# Patient Record
Sex: MALE | Race: WHITE | NOT HISPANIC OR LATINO | URBAN - METROPOLITAN AREA
[De-identification: names, ages, dates, MRNs, and addresses within clinical notes are randomized per-mention and may not be internally consistent; named-entity substitution may affect disease eponyms.]

---

## 2023-10-28 ENCOUNTER — INPATIENT (INPATIENT)
Facility: HOSPITAL | Age: 63
LOS: 4 days | Discharge: HOME | DRG: 669 | End: 2023-11-02
Attending: INTERNAL MEDICINE | Admitting: INTERNAL MEDICINE
Payer: COMMERCIAL

## 2023-10-28 VITALS
HEIGHT: 67 IN | RESPIRATION RATE: 20 BRPM | WEIGHT: 199.96 LBS | SYSTOLIC BLOOD PRESSURE: 178 MMHG | DIASTOLIC BLOOD PRESSURE: 98 MMHG | HEART RATE: 90 BPM | OXYGEN SATURATION: 95 % | TEMPERATURE: 99 F

## 2023-10-28 DIAGNOSIS — R31.9 HEMATURIA, UNSPECIFIED: ICD-10-CM

## 2023-10-28 DIAGNOSIS — N13.2 HYDRONEPHROSIS WITH RENAL AND URETERAL CALCULOUS OBSTRUCTION: ICD-10-CM

## 2023-10-28 LAB
ANION GAP SERPL CALC-SCNC: 13 MMOL/L — SIGNIFICANT CHANGE UP (ref 5–17)
ANION GAP SERPL CALC-SCNC: 13 MMOL/L — SIGNIFICANT CHANGE UP (ref 5–17)
APPEARANCE UR: ABNORMAL
APPEARANCE UR: ABNORMAL
APTT BLD: 26.8 SEC — SIGNIFICANT CHANGE UP (ref 24.5–35.6)
APTT BLD: 26.8 SEC — SIGNIFICANT CHANGE UP (ref 24.5–35.6)
BACTERIA # UR AUTO: NEGATIVE — SIGNIFICANT CHANGE UP
BACTERIA # UR AUTO: NEGATIVE — SIGNIFICANT CHANGE UP
BASE EXCESS BLDV CALC-SCNC: 1.2 MMOL/L — SIGNIFICANT CHANGE UP (ref -2–3)
BASE EXCESS BLDV CALC-SCNC: 1.2 MMOL/L — SIGNIFICANT CHANGE UP (ref -2–3)
BASE EXCESS BLDV CALC-SCNC: 2.3 MMOL/L — SIGNIFICANT CHANGE UP (ref -2–3)
BASE EXCESS BLDV CALC-SCNC: 2.3 MMOL/L — SIGNIFICANT CHANGE UP (ref -2–3)
BASOPHILS # BLD AUTO: 0.09 K/UL — SIGNIFICANT CHANGE UP (ref 0–0.2)
BASOPHILS # BLD AUTO: 0.09 K/UL — SIGNIFICANT CHANGE UP (ref 0–0.2)
BASOPHILS NFR BLD AUTO: 0.8 % — SIGNIFICANT CHANGE UP (ref 0–2)
BASOPHILS NFR BLD AUTO: 0.8 % — SIGNIFICANT CHANGE UP (ref 0–2)
BILIRUB UR-MCNC: NEGATIVE — SIGNIFICANT CHANGE UP
BILIRUB UR-MCNC: NEGATIVE — SIGNIFICANT CHANGE UP
BUN SERPL-MCNC: 16 MG/DL — SIGNIFICANT CHANGE UP (ref 7–23)
BUN SERPL-MCNC: 16 MG/DL — SIGNIFICANT CHANGE UP (ref 7–23)
CA-I SERPL-SCNC: 1.13 MMOL/L — LOW (ref 1.15–1.33)
CA-I SERPL-SCNC: 1.13 MMOL/L — LOW (ref 1.15–1.33)
CA-I SERPL-SCNC: 1.16 MMOL/L — SIGNIFICANT CHANGE UP (ref 1.15–1.33)
CA-I SERPL-SCNC: 1.16 MMOL/L — SIGNIFICANT CHANGE UP (ref 1.15–1.33)
CALCIUM SERPL-MCNC: 8.8 MG/DL — SIGNIFICANT CHANGE UP (ref 8.4–10.5)
CALCIUM SERPL-MCNC: 8.8 MG/DL — SIGNIFICANT CHANGE UP (ref 8.4–10.5)
CHLORIDE BLDV-SCNC: 104 MMOL/L — SIGNIFICANT CHANGE UP (ref 96–108)
CHLORIDE BLDV-SCNC: 104 MMOL/L — SIGNIFICANT CHANGE UP (ref 96–108)
CHLORIDE BLDV-SCNC: 106 MMOL/L — SIGNIFICANT CHANGE UP (ref 96–108)
CHLORIDE BLDV-SCNC: 106 MMOL/L — SIGNIFICANT CHANGE UP (ref 96–108)
CHLORIDE SERPL-SCNC: 99 MMOL/L — SIGNIFICANT CHANGE UP (ref 96–108)
CHLORIDE SERPL-SCNC: 99 MMOL/L — SIGNIFICANT CHANGE UP (ref 96–108)
CO2 BLDV-SCNC: 29 MMOL/L — HIGH (ref 22–26)
CO2 SERPL-SCNC: 22 MMOL/L — SIGNIFICANT CHANGE UP (ref 22–31)
CO2 SERPL-SCNC: 22 MMOL/L — SIGNIFICANT CHANGE UP (ref 22–31)
COLOR SPEC: ABNORMAL
COLOR SPEC: ABNORMAL
CREAT SERPL-MCNC: 1.34 MG/DL — HIGH (ref 0.5–1.3)
CREAT SERPL-MCNC: 1.34 MG/DL — HIGH (ref 0.5–1.3)
DIFF PNL FLD: ABNORMAL
DIFF PNL FLD: ABNORMAL
EGFR: 60 ML/MIN/1.73M2 — SIGNIFICANT CHANGE UP
EGFR: 60 ML/MIN/1.73M2 — SIGNIFICANT CHANGE UP
EOSINOPHIL # BLD AUTO: 0.13 K/UL — SIGNIFICANT CHANGE UP (ref 0–0.5)
EOSINOPHIL # BLD AUTO: 0.13 K/UL — SIGNIFICANT CHANGE UP (ref 0–0.5)
EOSINOPHIL NFR BLD AUTO: 1.1 % — SIGNIFICANT CHANGE UP (ref 0–6)
EOSINOPHIL NFR BLD AUTO: 1.1 % — SIGNIFICANT CHANGE UP (ref 0–6)
EPI CELLS # UR: 0 /HPF — SIGNIFICANT CHANGE UP
EPI CELLS # UR: 0 /HPF — SIGNIFICANT CHANGE UP
GAS PNL BLDV: 135 MMOL/L — LOW (ref 136–145)
GAS PNL BLDV: 135 MMOL/L — LOW (ref 136–145)
GAS PNL BLDV: 138 MMOL/L — SIGNIFICANT CHANGE UP (ref 136–145)
GAS PNL BLDV: 138 MMOL/L — SIGNIFICANT CHANGE UP (ref 136–145)
GAS PNL BLDV: SIGNIFICANT CHANGE UP
GLUCOSE BLDC GLUCOMTR-MCNC: 294 MG/DL — HIGH (ref 70–99)
GLUCOSE BLDC GLUCOMTR-MCNC: 294 MG/DL — HIGH (ref 70–99)
GLUCOSE BLDV-MCNC: 273 MG/DL — HIGH (ref 70–99)
GLUCOSE BLDV-MCNC: 273 MG/DL — HIGH (ref 70–99)
GLUCOSE BLDV-MCNC: 447 MG/DL — HIGH (ref 70–99)
GLUCOSE BLDV-MCNC: 447 MG/DL — HIGH (ref 70–99)
GLUCOSE SERPL-MCNC: 477 MG/DL — CRITICAL HIGH (ref 70–99)
GLUCOSE SERPL-MCNC: 477 MG/DL — CRITICAL HIGH (ref 70–99)
GLUCOSE UR QL: ABNORMAL
GLUCOSE UR QL: ABNORMAL
HCO3 BLDV-SCNC: 27 MMOL/L — SIGNIFICANT CHANGE UP (ref 22–29)
HCO3 BLDV-SCNC: 27 MMOL/L — SIGNIFICANT CHANGE UP (ref 22–29)
HCO3 BLDV-SCNC: 28 MMOL/L — SIGNIFICANT CHANGE UP (ref 22–29)
HCO3 BLDV-SCNC: 28 MMOL/L — SIGNIFICANT CHANGE UP (ref 22–29)
HCT VFR BLD CALC: 25.8 % — LOW (ref 39–50)
HCT VFR BLD CALC: 25.8 % — LOW (ref 39–50)
HCT VFR BLDA CALC: 24 % — LOW (ref 39–51)
HCT VFR BLDA CALC: 24 % — LOW (ref 39–51)
HCT VFR BLDA CALC: 26 % — LOW (ref 39–51)
HCT VFR BLDA CALC: 26 % — LOW (ref 39–51)
HGB BLD CALC-MCNC: 8.1 G/DL — LOW (ref 12.6–17.4)
HGB BLD CALC-MCNC: 8.1 G/DL — LOW (ref 12.6–17.4)
HGB BLD CALC-MCNC: 8.5 G/DL — LOW (ref 12.6–17.4)
HGB BLD CALC-MCNC: 8.5 G/DL — LOW (ref 12.6–17.4)
HGB BLD-MCNC: 8.4 G/DL — LOW (ref 13–17)
HGB BLD-MCNC: 8.4 G/DL — LOW (ref 13–17)
HYALINE CASTS # UR AUTO: 15 /LPF — HIGH (ref 0–7)
HYALINE CASTS # UR AUTO: 15 /LPF — HIGH (ref 0–7)
IMM GRANULOCYTES NFR BLD AUTO: 1.4 % — HIGH (ref 0–0.9)
IMM GRANULOCYTES NFR BLD AUTO: 1.4 % — HIGH (ref 0–0.9)
INR BLD: 1.11 RATIO — SIGNIFICANT CHANGE UP (ref 0.85–1.18)
INR BLD: 1.11 RATIO — SIGNIFICANT CHANGE UP (ref 0.85–1.18)
KETONES UR-MCNC: NEGATIVE — SIGNIFICANT CHANGE UP
KETONES UR-MCNC: NEGATIVE — SIGNIFICANT CHANGE UP
LACTATE BLDV-MCNC: 1.6 MMOL/L — SIGNIFICANT CHANGE UP (ref 0.5–2)
LACTATE BLDV-MCNC: 1.6 MMOL/L — SIGNIFICANT CHANGE UP (ref 0.5–2)
LACTATE BLDV-MCNC: 1.9 MMOL/L — SIGNIFICANT CHANGE UP (ref 0.5–2)
LACTATE BLDV-MCNC: 1.9 MMOL/L — SIGNIFICANT CHANGE UP (ref 0.5–2)
LEUKOCYTE ESTERASE UR-ACNC: NEGATIVE — SIGNIFICANT CHANGE UP
LEUKOCYTE ESTERASE UR-ACNC: NEGATIVE — SIGNIFICANT CHANGE UP
LYMPHOCYTES # BLD AUTO: 2.4 K/UL — SIGNIFICANT CHANGE UP (ref 1–3.3)
LYMPHOCYTES # BLD AUTO: 2.4 K/UL — SIGNIFICANT CHANGE UP (ref 1–3.3)
LYMPHOCYTES # BLD AUTO: 20.7 % — SIGNIFICANT CHANGE UP (ref 13–44)
LYMPHOCYTES # BLD AUTO: 20.7 % — SIGNIFICANT CHANGE UP (ref 13–44)
MCHC RBC-ENTMCNC: 32.6 GM/DL — SIGNIFICANT CHANGE UP (ref 32–36)
MCHC RBC-ENTMCNC: 32.6 GM/DL — SIGNIFICANT CHANGE UP (ref 32–36)
MCHC RBC-ENTMCNC: 32.8 PG — SIGNIFICANT CHANGE UP (ref 27–34)
MCHC RBC-ENTMCNC: 32.8 PG — SIGNIFICANT CHANGE UP (ref 27–34)
MCV RBC AUTO: 100.8 FL — HIGH (ref 80–100)
MCV RBC AUTO: 100.8 FL — HIGH (ref 80–100)
MONOCYTES # BLD AUTO: 1.17 K/UL — HIGH (ref 0–0.9)
MONOCYTES # BLD AUTO: 1.17 K/UL — HIGH (ref 0–0.9)
MONOCYTES NFR BLD AUTO: 10.1 % — SIGNIFICANT CHANGE UP (ref 2–14)
MONOCYTES NFR BLD AUTO: 10.1 % — SIGNIFICANT CHANGE UP (ref 2–14)
NEUTROPHILS # BLD AUTO: 7.66 K/UL — HIGH (ref 1.8–7.4)
NEUTROPHILS # BLD AUTO: 7.66 K/UL — HIGH (ref 1.8–7.4)
NEUTROPHILS NFR BLD AUTO: 65.9 % — SIGNIFICANT CHANGE UP (ref 43–77)
NEUTROPHILS NFR BLD AUTO: 65.9 % — SIGNIFICANT CHANGE UP (ref 43–77)
NITRITE UR-MCNC: NEGATIVE — SIGNIFICANT CHANGE UP
NITRITE UR-MCNC: NEGATIVE — SIGNIFICANT CHANGE UP
NRBC # BLD: 0 /100 WBCS — SIGNIFICANT CHANGE UP (ref 0–0)
NRBC # BLD: 0 /100 WBCS — SIGNIFICANT CHANGE UP (ref 0–0)
PCO2 BLDV: 47 MMHG — SIGNIFICANT CHANGE UP (ref 42–55)
PCO2 BLDV: 47 MMHG — SIGNIFICANT CHANGE UP (ref 42–55)
PCO2 BLDV: 49 MMHG — SIGNIFICANT CHANGE UP (ref 42–55)
PCO2 BLDV: 49 MMHG — SIGNIFICANT CHANGE UP (ref 42–55)
PH BLDV: 7.35 — SIGNIFICANT CHANGE UP (ref 7.32–7.43)
PH BLDV: 7.35 — SIGNIFICANT CHANGE UP (ref 7.32–7.43)
PH BLDV: 7.38 — SIGNIFICANT CHANGE UP (ref 7.32–7.43)
PH BLDV: 7.38 — SIGNIFICANT CHANGE UP (ref 7.32–7.43)
PH UR: 6 — SIGNIFICANT CHANGE UP (ref 5–8)
PH UR: 6 — SIGNIFICANT CHANGE UP (ref 5–8)
PLATELET # BLD AUTO: 162 K/UL — SIGNIFICANT CHANGE UP (ref 150–400)
PLATELET # BLD AUTO: 162 K/UL — SIGNIFICANT CHANGE UP (ref 150–400)
PO2 BLDV: 19 MMHG — LOW (ref 25–45)
PO2 BLDV: 19 MMHG — LOW (ref 25–45)
PO2 BLDV: 30 MMHG — SIGNIFICANT CHANGE UP (ref 25–45)
PO2 BLDV: 30 MMHG — SIGNIFICANT CHANGE UP (ref 25–45)
POTASSIUM BLDV-SCNC: 4.6 MMOL/L — SIGNIFICANT CHANGE UP (ref 3.5–5.1)
POTASSIUM SERPL-MCNC: 4.4 MMOL/L — SIGNIFICANT CHANGE UP (ref 3.5–5.3)
POTASSIUM SERPL-MCNC: 4.4 MMOL/L — SIGNIFICANT CHANGE UP (ref 3.5–5.3)
POTASSIUM SERPL-SCNC: 4.4 MMOL/L — SIGNIFICANT CHANGE UP (ref 3.5–5.3)
POTASSIUM SERPL-SCNC: 4.4 MMOL/L — SIGNIFICANT CHANGE UP (ref 3.5–5.3)
PROT UR-MCNC: ABNORMAL
PROT UR-MCNC: ABNORMAL
PROTHROM AB SERPL-ACNC: 12.2 SEC — SIGNIFICANT CHANGE UP (ref 9.5–13)
PROTHROM AB SERPL-ACNC: 12.2 SEC — SIGNIFICANT CHANGE UP (ref 9.5–13)
RBC # BLD: 2.56 M/UL — LOW (ref 4.2–5.8)
RBC # BLD: 2.56 M/UL — LOW (ref 4.2–5.8)
RBC # FLD: 14.7 % — HIGH (ref 10.3–14.5)
RBC # FLD: 14.7 % — HIGH (ref 10.3–14.5)
RBC CASTS # UR COMP ASSIST: 254 /HPF — HIGH (ref 0–4)
RBC CASTS # UR COMP ASSIST: 254 /HPF — HIGH (ref 0–4)
SAO2 % BLDV: 26.7 % — LOW (ref 67–88)
SAO2 % BLDV: 26.7 % — LOW (ref 67–88)
SAO2 % BLDV: 48.5 % — LOW (ref 67–88)
SAO2 % BLDV: 48.5 % — LOW (ref 67–88)
SODIUM SERPL-SCNC: 134 MMOL/L — LOW (ref 135–145)
SODIUM SERPL-SCNC: 134 MMOL/L — LOW (ref 135–145)
SP GR SPEC: 1.03 — HIGH (ref 1.01–1.02)
SP GR SPEC: 1.03 — HIGH (ref 1.01–1.02)
UROBILINOGEN FLD QL: NEGATIVE — SIGNIFICANT CHANGE UP
UROBILINOGEN FLD QL: NEGATIVE — SIGNIFICANT CHANGE UP
WBC # BLD: 11.61 K/UL — HIGH (ref 3.8–10.5)
WBC # BLD: 11.61 K/UL — HIGH (ref 3.8–10.5)
WBC # FLD AUTO: 11.61 K/UL — HIGH (ref 3.8–10.5)
WBC # FLD AUTO: 11.61 K/UL — HIGH (ref 3.8–10.5)
WBC UR QL: 1 /HPF — SIGNIFICANT CHANGE UP (ref 0–5)
WBC UR QL: 1 /HPF — SIGNIFICANT CHANGE UP (ref 0–5)

## 2023-10-28 PROCEDURE — 99285 EMERGENCY DEPT VISIT HI MDM: CPT

## 2023-10-28 PROCEDURE — 74177 CT ABD & PELVIS W/CONTRAST: CPT | Mod: 26,MA

## 2023-10-28 RX ORDER — DEXTROSE 50 % IN WATER 50 %
25 SYRINGE (ML) INTRAVENOUS ONCE
Refills: 0 | Status: DISCONTINUED | OUTPATIENT
Start: 2023-10-28 | End: 2023-10-31

## 2023-10-28 RX ORDER — ASPIRIN/CALCIUM CARB/MAGNESIUM 324 MG
81 TABLET ORAL DAILY
Refills: 0 | Status: DISCONTINUED | OUTPATIENT
Start: 2023-10-28 | End: 2023-10-31

## 2023-10-28 RX ORDER — MORPHINE SULFATE 50 MG/1
4 CAPSULE, EXTENDED RELEASE ORAL EVERY 6 HOURS
Refills: 0 | Status: DISCONTINUED | OUTPATIENT
Start: 2023-10-28 | End: 2023-10-31

## 2023-10-28 RX ORDER — INSULIN LISPRO 100/ML
2 VIAL (ML) SUBCUTANEOUS
Refills: 0 | Status: DISCONTINUED | OUTPATIENT
Start: 2023-10-28 | End: 2023-10-29

## 2023-10-28 RX ORDER — SODIUM CHLORIDE 9 MG/ML
1000 INJECTION, SOLUTION INTRAVENOUS
Refills: 0 | Status: DISCONTINUED | OUTPATIENT
Start: 2023-10-28 | End: 2023-10-31

## 2023-10-28 RX ORDER — GLUCAGON INJECTION, SOLUTION 0.5 MG/.1ML
1 INJECTION, SOLUTION SUBCUTANEOUS ONCE
Refills: 0 | Status: DISCONTINUED | OUTPATIENT
Start: 2023-10-28 | End: 2023-10-31

## 2023-10-28 RX ORDER — INSULIN LISPRO 100/ML
VIAL (ML) SUBCUTANEOUS
Refills: 0 | Status: DISCONTINUED | OUTPATIENT
Start: 2023-10-28 | End: 2023-10-29

## 2023-10-28 RX ORDER — SODIUM CHLORIDE 9 MG/ML
1000 INJECTION INTRAMUSCULAR; INTRAVENOUS; SUBCUTANEOUS ONCE
Refills: 0 | Status: COMPLETED | OUTPATIENT
Start: 2023-10-28 | End: 2023-10-28

## 2023-10-28 RX ORDER — INSULIN GLARGINE 100 [IU]/ML
20 INJECTION, SOLUTION SUBCUTANEOUS AT BEDTIME
Refills: 0 | Status: DISCONTINUED | OUTPATIENT
Start: 2023-10-28 | End: 2023-10-29

## 2023-10-28 RX ORDER — SODIUM CHLORIDE 9 MG/ML
1000 INJECTION INTRAMUSCULAR; INTRAVENOUS; SUBCUTANEOUS
Refills: 0 | Status: DISCONTINUED | OUTPATIENT
Start: 2023-10-28 | End: 2023-10-31

## 2023-10-28 RX ORDER — ACETAMINOPHEN 500 MG
650 TABLET ORAL EVERY 6 HOURS
Refills: 0 | Status: DISCONTINUED | OUTPATIENT
Start: 2023-10-28 | End: 2023-10-31

## 2023-10-28 RX ORDER — INSULIN LISPRO 100/ML
5 VIAL (ML) SUBCUTANEOUS ONCE
Refills: 0 | Status: COMPLETED | OUTPATIENT
Start: 2023-10-28 | End: 2023-10-28

## 2023-10-28 RX ORDER — SODIUM CHLORIDE 9 MG/ML
2000 INJECTION INTRAMUSCULAR; INTRAVENOUS; SUBCUTANEOUS ONCE
Refills: 0 | Status: DISCONTINUED | OUTPATIENT
Start: 2023-10-28 | End: 2023-10-28

## 2023-10-28 RX ORDER — DEXTROSE 50 % IN WATER 50 %
15 SYRINGE (ML) INTRAVENOUS ONCE
Refills: 0 | Status: DISCONTINUED | OUTPATIENT
Start: 2023-10-28 | End: 2023-10-31

## 2023-10-28 RX ORDER — DEXTROSE 50 % IN WATER 50 %
12.5 SYRINGE (ML) INTRAVENOUS ONCE
Refills: 0 | Status: DISCONTINUED | OUTPATIENT
Start: 2023-10-28 | End: 2023-10-31

## 2023-10-28 RX ORDER — INSULIN HUMAN 100 [IU]/ML
5 INJECTION, SOLUTION SUBCUTANEOUS ONCE
Refills: 0 | Status: DISCONTINUED | OUTPATIENT
Start: 2023-10-28 | End: 2023-10-28

## 2023-10-28 RX ADMIN — Medication 5 UNIT(S): at 06:28

## 2023-10-28 RX ADMIN — SODIUM CHLORIDE 100 MILLILITER(S): 9 INJECTION INTRAMUSCULAR; INTRAVENOUS; SUBCUTANEOUS at 23:57

## 2023-10-28 RX ADMIN — INSULIN GLARGINE 20 UNIT(S): 100 INJECTION, SOLUTION SUBCUTANEOUS at 23:18

## 2023-10-28 RX ADMIN — SODIUM CHLORIDE 1000 MILLILITER(S): 9 INJECTION INTRAMUSCULAR; INTRAVENOUS; SUBCUTANEOUS at 02:53

## 2023-10-28 NOTE — CONSULT NOTE ADULT - ATTENDING COMMENTS
Pt seen and examined. CT reviewed. Has gross hematuria requiring CBI. On exam today, irrigated at bedside with some clot obtained. On CT, appears to have hydro distal to the L UVJ stone. Given this and continued hematuria despite irrigation, suspicious for intravesical pathology. Will likely need OR for eval with clot evacuation and possible resection. Needs medical optimization in pt with poor medical care prior. Cont CBI for now. Transfuse as needed.

## 2023-10-28 NOTE — CONSULT NOTE ADULT - ASSESSMENT
63 year old male with hematuria, clots    -continue CBI and wean as tolerated  -transfuse as needed  -medicine evaluation  -case d/w Dr Rahman 63 year old male with hematuria, clots; L 3mm UVJ calculus    -continue CBI and wean as tolerated  -transfuse as needed  -IV fluids  -medicine evaluation  -case d/w Dr Rahman

## 2023-10-28 NOTE — ED PROVIDER NOTE - NS ED ROS FT
GENERAL: No fever, no chills  	EYES: No change in vision  	HEENT: No trouble swallowing or speaking  	CARDIAC: No chest pain  	PULMONARY: No cough, no SOB  	GI: No abdominal pain, no nausea, no vomiting, no diarrhea, no constipation  	: + changes in urination  	SKIN: No rashes  	NEURO: No headache, no numbness  	MSK: No visible bony deformity   Otherwise as HPI or negative.

## 2023-10-28 NOTE — ED PROVIDER NOTE - OBJECTIVE STATEMENT
This is a 63 year old male with no significant pmh presenting with painless hematuria for the past few weeks worsening during past week, passing clots not on anticoagulation. Denies any lightheadedness, dizziness, shortness of breath, abdominal pain. Feels a little bit of "retaining feeling" however has been urinating normally and spontaneously. Denies any fever/chills, abdominal pain, flank pain. No nausea/vomiting. No prostate history. No cancer history however has no regular check up with doctors.

## 2023-10-28 NOTE — H&P ADULT - ASSESSMENT
63y old Male with no PMHx, and has not seen a PMD recently who presents with painless hematuria for 2 weeks, passing clots, not on AC.    Denies any lightheadedness, dizziness, shortness of breath, abdominal pain. Feels a little bit of "retaining feeling" however has been urinating normally and spontaneously. Denies any fever/chills, abdominal pain, flank pain. No nausea/vomiting. No prostate history. No cancer history however has not had a regular check up with doctors in a while.  He states that he noticed increased difficulty voiding and pain at the tip of the penis with the bleeding.   denies any flank pain, nausea/vomiting, fevers, chills.    In the ER ptn was seen by , CBI placed, ct A/P done, ptn is noted to be hyperglycemic    CBI and renal stone/Hydro management as per   start Lantus, pre meal Lispro, ISS, check HA1C, check TFTs, check Lipid panel  Endo called  check VBG, check BHB( ordered)  DVT ppx w SCD 63y old Male with no PMHx, and has not seen a PMD recently who presents with painless hematuria for 2 weeks, passing clots, not on AC.    Denies any lightheadedness, dizziness, shortness of breath, abdominal pain. Feels a little bit of "retaining feeling" however has been urinating normally and spontaneously. Denies any fever/chills, abdominal pain, flank pain. No nausea/vomiting. No prostate history. No cancer history however has not had a regular check up with doctors in a while.  He states that he noticed increased difficulty voiding and pain at the tip of the penis with the bleeding.   denies any flank pain, nausea/vomiting, fevers, chills.    In the ER ptn was seen by , CBI placed, ct A/P done, ptn is noted to be hyperglycemic, and anemic w elevated MCV    CBI and renal stone/Hydro management as per   start Lantus, pre meal Lispro, ISS, check HA1C, check TFTs, check Lipid panel  Endo called  check VBG, check BHB( ordered)  anemia, tend H/H, check anemia indices  DVT ppx w SCD

## 2023-10-28 NOTE — ED ADULT NURSE NOTE - OBJECTIVE STATEMENT
Pt is 63Y M, denies pmhx, c/o urinary symptoms, frequency, blood in urine with clots for 2 weeks, pt states clots started last two days, blood for 2 weeks, no pain, pain in suprapubic area, pt presents to ED with distended abdomen, no NVD, fever, chills, or any other symptoms, pt A&Ox4, ambulatory, updated on plan of care,

## 2023-10-28 NOTE — ED PROVIDER NOTE - PROGRESS NOTE DETAILS
Eric Jaquez, PGY2 - post void residual 50ml. Not in retention. Urine grossly dark and red. no clots seen. Yonis Paris MD, PGY2  Pt seen by urology, clots in bladder, CBI initiated. Also new onset DM. Pt will require admission. Urology requesting med admit. Pt in agreement with plan. Endorsed to unattached hospitalist, Dr. Reed.

## 2023-10-28 NOTE — ED PROVIDER NOTE - CLINICAL SUMMARY MEDICAL DECISION MAKING FREE TEXT BOX
63-year-old male smoker here with complaint of gross hematuria for the past 1 week.  Not on any anticoagulants.  He reports he has had similar in the past but it was just a little bit and then would clear up.  For the past 1 week he has been having bright red urine occasionally with clots and feels as though he is not completely emptying his bladder.  He reports he has some mild suprapubic discomfort as well as some discomfort at the tip of his penis with urination.  No flank pain no fevers no weight loss or night sweats.  He does report some mild shortness of breath with exertion and denies chest pain.  On exam he is well-appearing hemodynamically stable normal heart and lung exam abdomen is obese soft with a reducible umbilical hernia that is nontender mild suprapubic tenderness.  Check UA UC for signs of infection.  With gross hematuria and history of tobacco use concern for malignancy check CT abdomen pelvis.  Check labs including CBC and kidney functions.  Will obtain ultrasound the bladder to evaluate for retention.  Dispo pending results of above.

## 2023-10-28 NOTE — H&P ADULT - HISTORY OF PRESENT ILLNESS
63y old Male with no PMHx, and has not seen a PMD recently who presents with painless hematuria for 2 weeks, passing clots, not on AC.    Denies any lightheadedness, dizziness, shortness of breath, abdominal pain. Feels a little bit of "retaining feeling" however has been urinating normally and spontaneously. Denies any fever/chills, abdominal pain, flank pain. No nausea/vomiting. No prostate history. No cancer history however has not had a regular check up with doctors in a while.  He states that he noticed increased difficulty voiding and pain at the tip of the penis with the bleeding.   denies any flank pain, nausea/vomiting, fevers, chills.    In the ER ptn was seen by , CBI placed, ct A/P done, ptn is noted to be hyperglycemic

## 2023-10-28 NOTE — CONSULT NOTE ADULT - SUBJECTIVE AND OBJECTIVE BOX
UROLOGY CONSULT NOTE    HPI:  This is a 63y old Male with no PMHx, and has not seen a PMD recently who presents with blood in his urine for 2 weeks.  He states that he noticed increased difficulty voiding and pain at the tip of the penis with the bleeding.  He admits to seeing clots in the urine but denies any flank pain, nausea/vomiting, fevers, chills.  In the ER, a 6 eye catheter was used to irrigate out a large volume of clots and CBI was then started.      PAST MEDICAL HISTORY    No pertinent past medical history      PAST SURGICAL HISTORY    No significant past surgical history      FAMILY HISTORY    noncontributory    SOCIAL HISTORY    current every day smoker    HOME MEDICATIONS    none    DRUG ALLERGIES    No Known Allergies      REVIEW OF SYSTEMS: Pertinent positives and negatives as stated in HPI, otherwise negative      VITAL SIGNS    T(F): 99.1, Max: 99.1 (10-28-23 @ 04:27)  HR: 83  BP: 160/91  RR: 16  SpO2: 94%    I's & O's      28 Oct 2023 07:01  -  28 Oct 2023 15:21  --------------------------------------------------------  IN: 3300 mL / OUT: 0 mL / NET: 3300 mL        PHYSICAL EXAM    Gen: Well groomed, well dressed, well nourished; missing dentition  Abd: Soft, NT/ND  : Circumcised, no lesions.  Testes descended bilaterally.  Ext: No edema present b/l      LABS:                        8.4    11.61 )-----------( 162               25.8     134  |  99  |  16  ----------------------------<  477  4.4   |  22  |  1.34    Ca    8.8    TPro  6.7  /  Alb  3.5  /  TBili  0.5  /  DBili  0.2  /  AST  39  /  ALT  26  /  AlkPhos  100    PT: 12.2 sec;   INR: 1.11 ratio  PTT:26.8 sec      Urinalysis Basic:    Color: Red / Appearance: Turbid / S.027 / pH: x  Gluc: x / Ketone: Negative  / Bili: Negative / Urobili: Negative   Blood: x / Protein: 100 mg/dL / Nitrite: Negative   Leuk Esterase: Negative / RBC: 254 /hpf / WBC 1 /HPF   Sq Epi: x / Non Sq Epi: x / Bacteria: Negative        IMAGING:    CT: Left hydronephrosis with a 3 mm distal ureteral calculus near the UVJ.   The stone does not appear to be obstructive at the time of examination.  Suspect blood clots within the bladder lumen. Consider ultrasound to   evaluate for ureteral jets.

## 2023-10-28 NOTE — CONSULT NOTE ADULT - SUBJECTIVE AND OBJECTIVE BOX
CHIEF COMPLAINT:    HISTORY OF PRESENT ILLNESS:   63y old Male with no PMHx, and has not seen a PMD recently who presents with painless hematuria for 2 weeks, passing clots, not on AC.    Denies any lightheadedness, dizziness, shortness of breath, abdominal pain. Feels a little bit of "retaining feeling" however has been urinating normally and spontaneously. Denies any fever/chills, abdominal pain, flank pain. No nausea/vomiting. No prostate history. No cancer history however has not had a regular check up with doctors in a while.  He states that he noticed increased difficulty voiding and pain at the tip of the penis with the bleeding.   denies any flank pain, nausea/vomiting, fevers, chills.    In the ER ptn was seen by , CBI placed, ct A/P done,    PAST MEDICAL & SURGICAL HISTORY:  No pertinent past medical history      No significant past surgical history              MEDICATIONS:  aspirin enteric coated 81 milliGRAM(s) Oral daily        acetaminophen     Tablet .. 650 milliGRAM(s) Oral every 6 hours PRN      dextrose 50% Injectable 12.5 Gram(s) IV Push once  dextrose 50% Injectable 25 Gram(s) IV Push once  dextrose 50% Injectable 25 Gram(s) IV Push once  dextrose Oral Gel 15 Gram(s) Oral once PRN  glucagon  Injectable 1 milliGRAM(s) IntraMuscular once  insulin glargine Injectable (LANTUS) 20 Unit(s) SubCutaneous at bedtime  insulin lispro (ADMELOG) corrective regimen sliding scale   SubCutaneous three times a day before meals  insulin lispro Injectable (ADMELOG) 2 Unit(s) SubCutaneous three times a day before meals    dextrose 5%. 1000 milliLiter(s) IV Continuous <Continuous>  dextrose 5%. 1000 milliLiter(s) IV Continuous <Continuous>  sodium chloride 0.9%. 1000 milliLiter(s) IV Continuous <Continuous>      FAMILY HISTORY:      SOCIAL HISTORY:    [ ] Non-smoker  [ ] Smoker  [ ] Alcohol    Allergies    No Known Allergies    Intolerances    	    REVIEW OF SYSTEMS:  CONSTITUTIONAL: No fever, weight loss, or fatigue  EYES: No eye pain, visual disturbances, or discharge  ENMT:  No difficulty hearing, tinnitus, vertigo; No sinus or throat pain  NECK: No pain or stiffness  RESPIRATORY: No cough, wheezing, chills or hemoptysis; No Shortness of Breath  CARDIOVASCULAR: No chest pain, palpitations, passing out, dizziness, or leg swelling  GASTROINTESTINAL: No abdominal or epigastric pain. No nausea, vomiting, or hematemesis; No diarrhea or constipation. No melena or hematochezia.  GENITOURINARY: No dysuria, frequency, ++hematuria, or incontinence  NEUROLOGICAL: No headaches, memory loss, loss of strength, numbness, or tremors  SKIN: No itching, burning, rashes, or lesions   LYMPH Nodes: No enlarged glands  ENDOCRINE: No heat or cold intolerance; No hair loss  MUSCULOSKELETAL: No joint pain or swelling; No muscle, back, or extremity pain  PSYCHIATRIC: No depression, anxiety, mood swings, or difficulty sleeping  HEME/LYMPH: No easy bruising, or bleeding gums  ALLERY AND IMMUNOLOGIC: No hives or eczema	    [ ] All others negative	  [ ] Unable to obtain    PHYSICAL EXAM:  T(C): 36.9 (10-28-23 @ 15:19), Max: 37.3 (10-28-23 @ 04:27)  HR: 81 (10-28-23 @ 15:19) (81 - 90)  BP: 140/78 (10-28-23 @ 15:19) (140/78 - 178/98)  RR: 16 (10-28-23 @ 15:19) (16 - 20)  SpO2: 95% (10-28-23 @ 15:19) (94% - 98%)  Wt(kg): --  I&O's Summary    28 Oct 2023 07:01  -  28 Oct 2023 20:53  --------------------------------------------------------  IN: 79855 mL / OUT: 54477 mL / NET: 0 mL        Appearance: Normal	  HEENT:   Normal oral mucosa, PERRL, EOMI	  Lymphatic: No lymphadenopathy  Cardiovascular: Normal S1 S2, No JVD, No murmurs, No edema  Respiratory: Lungs clear to auscultation	  Psychiatry: A & O x 3, Mood & affect appropriate  Gastrointestinal:  Soft, Non-tender, + BS	  Skin: No rashes, No ecchymoses, No cyanosis	  Neurologic: Non-focal  Extremities: Normal range of motion, No clubbing, cyanosis or edema  Vascular: Peripheral pulses palpable 2+ bilaterally  +CBI  TELEMETRY: 	    ECG:  	  RADIOLOGY:  < from: CT Abdomen and Pelvis w/ IV Cont (10.28.23 @ 09:40) >    ACC: [id number] EXAM:  CT ABDOMEN AND PELVIS IC   ORDERED BY: SANDEEP ELEK     PROCEDURE DATE:  10/28/2023          INTERPRETATION:  CLINICAL INFORMATION: Gross hematuria    COMPARISON: None.    CONTRAST/COMPLICATIONS:  IV Contrast: Omnipaque 350  90 cc administered   10 cc discarded  Oral Contrast: NONE  Complications: None reported at time of study completion    PROCEDURE:  CT of the Abdomen and Pelvis was performed.  Sagittal and coronal reformats were performed.    FINDINGS:  LOWER CHEST: Elevated left hemidiaphragm. Atelectatic changes in the left   lung base.    LIVER: Hepatic steatosis.  BILE DUCTS: Normal caliber.  GALLBLADDER: Within normal limits.  SPLEEN: Mild splenomegaly measuring 14.3 cm.  PANCREAS: Within normal limits.  ADRENALS: Within normal limits.  KIDNEYS/URETERS: Normal right kidney. Mild left hydronephrosis with a 3   mm layering distal ureteral calculus near the ureterovesical junction. 5   mm nonobstructive calculus upper pole left kidney.    BLADDER: Large amount of amorphous high density material within the   bladder lumen, likely due to blood clots.  REPRODUCTIVE ORGANS: Prostate within normal limits.    BOWEL: 4 cm fatty density within the stomach, likely a lipoma. No bowel   obstruction. Appendix is normal. Colonic diverticulosis.    PERITONEUM: Trace ascites left upper quadrant.  VESSELS: Atherosclerotic changes.  RETROPERITONEUM/LYMPH NODES: Borderline size upper abdominal lymph nodes   with celiac axis/naty hepatis lymph node measuring up to 1.7 cm inshort   axis.  ABDOMINAL WALL: Fat-containing ventral hernia at the level of umbilicus.  BONES: Degenerative changes. Mild retrolisthesis of L5 on S1.    IMPRESSION:    Left hydronephrosis with a 3 mm distal ureteral calculus near the UVJ.   The stone does not appear to be obstructive at the time of examination.  Suspect blood clots within the bladder lumen. Consider ultrasound to   evaluate for ureteral jets.                --- End of Report ---          ADAM LIVINGSTON MD; Resident Radiologist  This document has been electronically signed.  MONICA TOWNSEND MD; Attending Radiologist  This document has been electronically signed. Oct 28 2023 10:27AM    < end of copied text >  Urinalysis + Microscopic Examination (10.28.23 @ 01:51)   pH Urine: 6.0  Urine Appearance: Turbid  Color: Red  Specific Gravity: 1.027  Protein, Urine: 100 mg/dL  Glucose Qualitative, Urine: 1000 mg/dL  Ketone - Urine: Negative  Blood, Urine: Large  Bilirubin: Negative  Urobilinogen: Negative  Leukocyte Esterase Concentration: Negative  Nitrite: Negative  White Blood Cell - Urine: 1 /HPF  Red Blood Cell - Urine: 254 /hpf  Bacteria: Negative  Hyaline Casts: 15 /LPF  Squamous Epithelial Cells: 0 /hpf  OTHER: 	  	  LABS:	 	    CARDIAC MARKERS:                                  8.4    11.61 )-----------( 162      ( 28 Oct 2023 01:34 )             25.8     10-28    134<L>  |  99  |  16  ----------------------------<  477<HH>  4.4   |  22  |  1.34<H>    Ca    8.8      28 Oct 2023 01:34    TPro  6.7  /  Alb  3.5  /  TBili  0.5  /  DBili  0.2  /  AST  39  /  ALT  26  /  AlkPhos  100  10-28    proBNP:   Lipid Profile:   HgA1c:   TSH:

## 2023-10-28 NOTE — H&P ADULT - NSHPPHYSICALEXAM_GEN_ALL_CORE
T(C): 36.9 (10-28-23 @ 15:19), Max: 37.3 (10-28-23 @ 04:27)  HR: 81 (10-28-23 @ 15:19) (81 - 90)  BP: 140/78 (10-28-23 @ 15:19) (140/78 - 178/98)  RR: 16 (10-28-23 @ 15:19) (16 - 20)  SpO2: 95% (10-28-23 @ 15:19) (94% - 98%)    PHYSICAL EXAM:  GENERAL: NAD, well-developed  HEAD:  Atraumatic, Normocephalic  EYES: EOMI, PERRLA, conjunctiva and sclera clear  NECK: Supple, No JVD  CHEST/LUNG: Clear to auscultation bilaterally; No wheeze  HEART: Regular rate and rhythm; No murmurs, rubs, or gallops  ABDOMEN: Soft, Nontender, Nondistended; Bowel sounds present  EXTREMITIES:  2+ Peripheral Pulses, No clubbing, cyanosis, or edema  PSYCH: AAOx3  NEUROLOGY: non-focal  SKIN: No rashes or lesions

## 2023-10-28 NOTE — CONSULT NOTE ADULT - ASSESSMENT
63y old Male with no PMHx, and has not seen a PMD recently who presents with painless hematuria for 2 weeks, passing clots, not on AC.    Denies any lightheadedness, dizziness, shortness of breath, abdominal pain. Feels a little bit of "retaining feeling" however has been urinating normally and spontaneously. Denies any fever/chills, abdominal pain, flank pain. No nausea/vomiting. No prostate history. No cancer history however has not had a regular check up with doctors in a while.  He states that he noticed increased difficulty voiding and pain at the tip of the penis with the bleeding.   denies any flank pain, nausea/vomiting, fevers, chills.    In the ER ptn was seen by , CBI placed, ct A/P done

## 2023-10-29 DIAGNOSIS — R60.0 LOCALIZED EDEMA: ICD-10-CM

## 2023-10-29 DIAGNOSIS — Z72.0 TOBACCO USE: ICD-10-CM

## 2023-10-29 DIAGNOSIS — E11.9 TYPE 2 DIABETES MELLITUS WITHOUT COMPLICATIONS: ICD-10-CM

## 2023-10-29 LAB
A1C WITH ESTIMATED AVERAGE GLUCOSE RESULT: 10.8 % — HIGH (ref 4–5.6)
A1C WITH ESTIMATED AVERAGE GLUCOSE RESULT: 10.8 % — HIGH (ref 4–5.6)
ANION GAP SERPL CALC-SCNC: 9 MMOL/L — SIGNIFICANT CHANGE UP (ref 5–17)
ANION GAP SERPL CALC-SCNC: 9 MMOL/L — SIGNIFICANT CHANGE UP (ref 5–17)
B-OH-BUTYR SERPL-SCNC: 1.2 MMOL/L — HIGH
B-OH-BUTYR SERPL-SCNC: 1.2 MMOL/L — HIGH
BASE EXCESS BLDV CALC-SCNC: 0.9 MMOL/L — SIGNIFICANT CHANGE UP (ref -2–3)
BASE EXCESS BLDV CALC-SCNC: 0.9 MMOL/L — SIGNIFICANT CHANGE UP (ref -2–3)
BUN SERPL-MCNC: 12 MG/DL — SIGNIFICANT CHANGE UP (ref 7–23)
BUN SERPL-MCNC: 12 MG/DL — SIGNIFICANT CHANGE UP (ref 7–23)
CA-I SERPL-SCNC: 1.14 MMOL/L — LOW (ref 1.15–1.33)
CA-I SERPL-SCNC: 1.14 MMOL/L — LOW (ref 1.15–1.33)
CALCIUM SERPL-MCNC: 8 MG/DL — LOW (ref 8.4–10.5)
CALCIUM SERPL-MCNC: 8 MG/DL — LOW (ref 8.4–10.5)
CHLORIDE BLDV-SCNC: 106 MMOL/L — SIGNIFICANT CHANGE UP (ref 96–108)
CHLORIDE BLDV-SCNC: 106 MMOL/L — SIGNIFICANT CHANGE UP (ref 96–108)
CHLORIDE SERPL-SCNC: 104 MMOL/L — SIGNIFICANT CHANGE UP (ref 96–108)
CHLORIDE SERPL-SCNC: 104 MMOL/L — SIGNIFICANT CHANGE UP (ref 96–108)
CHOLEST SERPL-MCNC: 106 MG/DL — SIGNIFICANT CHANGE UP
CHOLEST SERPL-MCNC: 106 MG/DL — SIGNIFICANT CHANGE UP
CO2 BLDV-SCNC: 28 MMOL/L — HIGH (ref 22–26)
CO2 BLDV-SCNC: 28 MMOL/L — HIGH (ref 22–26)
CO2 SERPL-SCNC: 26 MMOL/L — SIGNIFICANT CHANGE UP (ref 22–31)
CO2 SERPL-SCNC: 26 MMOL/L — SIGNIFICANT CHANGE UP (ref 22–31)
CREAT SERPL-MCNC: 1.13 MG/DL — SIGNIFICANT CHANGE UP (ref 0.5–1.3)
CREAT SERPL-MCNC: 1.13 MG/DL — SIGNIFICANT CHANGE UP (ref 0.5–1.3)
CULTURE RESULTS: SIGNIFICANT CHANGE UP
CULTURE RESULTS: SIGNIFICANT CHANGE UP
EGFR: 73 ML/MIN/1.73M2 — SIGNIFICANT CHANGE UP
EGFR: 73 ML/MIN/1.73M2 — SIGNIFICANT CHANGE UP
ESTIMATED AVERAGE GLUCOSE: 263 MG/DL — HIGH (ref 68–114)
ESTIMATED AVERAGE GLUCOSE: 263 MG/DL — HIGH (ref 68–114)
FERRITIN SERPL-MCNC: 101 NG/ML — SIGNIFICANT CHANGE UP (ref 30–400)
FERRITIN SERPL-MCNC: 101 NG/ML — SIGNIFICANT CHANGE UP (ref 30–400)
FOLATE SERPL-MCNC: 15.5 NG/ML — SIGNIFICANT CHANGE UP
FOLATE SERPL-MCNC: 15.5 NG/ML — SIGNIFICANT CHANGE UP
GAS PNL BLDV: 137 MMOL/L — SIGNIFICANT CHANGE UP (ref 136–145)
GAS PNL BLDV: 137 MMOL/L — SIGNIFICANT CHANGE UP (ref 136–145)
GAS PNL BLDV: SIGNIFICANT CHANGE UP
GLUCOSE BLDC GLUCOMTR-MCNC: 305 MG/DL — HIGH (ref 70–99)
GLUCOSE BLDC GLUCOMTR-MCNC: 305 MG/DL — HIGH (ref 70–99)
GLUCOSE BLDC GLUCOMTR-MCNC: 308 MG/DL — HIGH (ref 70–99)
GLUCOSE BLDC GLUCOMTR-MCNC: 308 MG/DL — HIGH (ref 70–99)
GLUCOSE BLDC GLUCOMTR-MCNC: 352 MG/DL — HIGH (ref 70–99)
GLUCOSE BLDC GLUCOMTR-MCNC: 352 MG/DL — HIGH (ref 70–99)
GLUCOSE BLDC GLUCOMTR-MCNC: 361 MG/DL — HIGH (ref 70–99)
GLUCOSE BLDC GLUCOMTR-MCNC: 361 MG/DL — HIGH (ref 70–99)
GLUCOSE BLDC GLUCOMTR-MCNC: 370 MG/DL — HIGH (ref 70–99)
GLUCOSE BLDC GLUCOMTR-MCNC: 370 MG/DL — HIGH (ref 70–99)
GLUCOSE BLDV-MCNC: 348 MG/DL — HIGH (ref 70–99)
GLUCOSE BLDV-MCNC: 348 MG/DL — HIGH (ref 70–99)
GLUCOSE SERPL-MCNC: 325 MG/DL — HIGH (ref 70–99)
GLUCOSE SERPL-MCNC: 325 MG/DL — HIGH (ref 70–99)
HAPTOGLOB SERPL-MCNC: 158 MG/DL — SIGNIFICANT CHANGE UP (ref 34–200)
HAPTOGLOB SERPL-MCNC: 158 MG/DL — SIGNIFICANT CHANGE UP (ref 34–200)
HCO3 BLDV-SCNC: 27 MMOL/L — SIGNIFICANT CHANGE UP (ref 22–29)
HCO3 BLDV-SCNC: 27 MMOL/L — SIGNIFICANT CHANGE UP (ref 22–29)
HCT VFR BLD CALC: 24.1 % — LOW (ref 39–50)
HCT VFR BLD CALC: 24.1 % — LOW (ref 39–50)
HCT VFR BLDA CALC: 24 % — LOW (ref 39–51)
HCT VFR BLDA CALC: 24 % — LOW (ref 39–51)
HCV AB S/CO SERPL IA: 0.05 S/CO — SIGNIFICANT CHANGE UP (ref 0–0.99)
HCV AB S/CO SERPL IA: 0.05 S/CO — SIGNIFICANT CHANGE UP (ref 0–0.99)
HCV AB SERPL-IMP: SIGNIFICANT CHANGE UP
HCV AB SERPL-IMP: SIGNIFICANT CHANGE UP
HDLC SERPL-MCNC: 25 MG/DL — LOW
HDLC SERPL-MCNC: 25 MG/DL — LOW
HGB BLD CALC-MCNC: 7.9 G/DL — LOW (ref 12.6–17.4)
HGB BLD CALC-MCNC: 7.9 G/DL — LOW (ref 12.6–17.4)
HGB BLD-MCNC: 7.8 G/DL — LOW (ref 13–17)
HGB BLD-MCNC: 7.8 G/DL — LOW (ref 13–17)
IRON SATN MFR SERPL: 28 UG/DL — LOW (ref 45–165)
IRON SATN MFR SERPL: 28 UG/DL — LOW (ref 45–165)
LACTATE BLDV-MCNC: 2.8 MMOL/L — HIGH (ref 0.5–2)
LACTATE BLDV-MCNC: 2.8 MMOL/L — HIGH (ref 0.5–2)
LACTATE SERPL-SCNC: 1.9 MMOL/L — SIGNIFICANT CHANGE UP (ref 0.5–2)
LACTATE SERPL-SCNC: 1.9 MMOL/L — SIGNIFICANT CHANGE UP (ref 0.5–2)
LDH SERPL L TO P-CCNC: 181 U/L — SIGNIFICANT CHANGE UP (ref 50–242)
LDH SERPL L TO P-CCNC: 181 U/L — SIGNIFICANT CHANGE UP (ref 50–242)
LIPID PNL WITH DIRECT LDL SERPL: 61 MG/DL — SIGNIFICANT CHANGE UP
LIPID PNL WITH DIRECT LDL SERPL: 61 MG/DL — SIGNIFICANT CHANGE UP
MCHC RBC-ENTMCNC: 32.4 GM/DL — SIGNIFICANT CHANGE UP (ref 32–36)
MCHC RBC-ENTMCNC: 32.4 GM/DL — SIGNIFICANT CHANGE UP (ref 32–36)
MCHC RBC-ENTMCNC: 33.3 PG — SIGNIFICANT CHANGE UP (ref 27–34)
MCHC RBC-ENTMCNC: 33.3 PG — SIGNIFICANT CHANGE UP (ref 27–34)
MCV RBC AUTO: 103 FL — HIGH (ref 80–100)
MCV RBC AUTO: 103 FL — HIGH (ref 80–100)
NON HDL CHOLESTEROL: 81 MG/DL — SIGNIFICANT CHANGE UP
NON HDL CHOLESTEROL: 81 MG/DL — SIGNIFICANT CHANGE UP
NRBC # BLD: 0 /100 WBCS — SIGNIFICANT CHANGE UP (ref 0–0)
NRBC # BLD: 0 /100 WBCS — SIGNIFICANT CHANGE UP (ref 0–0)
PCO2 BLDV: 50 MMHG — SIGNIFICANT CHANGE UP (ref 42–55)
PCO2 BLDV: 50 MMHG — SIGNIFICANT CHANGE UP (ref 42–55)
PH BLDV: 7.34 — SIGNIFICANT CHANGE UP (ref 7.32–7.43)
PH BLDV: 7.34 — SIGNIFICANT CHANGE UP (ref 7.32–7.43)
PLATELET # BLD AUTO: 173 K/UL — SIGNIFICANT CHANGE UP (ref 150–400)
PLATELET # BLD AUTO: 173 K/UL — SIGNIFICANT CHANGE UP (ref 150–400)
PO2 BLDV: 24 MMHG — LOW (ref 25–45)
PO2 BLDV: 24 MMHG — LOW (ref 25–45)
POTASSIUM BLDV-SCNC: 4.5 MMOL/L — SIGNIFICANT CHANGE UP (ref 3.5–5.1)
POTASSIUM BLDV-SCNC: 4.5 MMOL/L — SIGNIFICANT CHANGE UP (ref 3.5–5.1)
POTASSIUM SERPL-MCNC: 4.2 MMOL/L — SIGNIFICANT CHANGE UP (ref 3.5–5.3)
POTASSIUM SERPL-MCNC: 4.2 MMOL/L — SIGNIFICANT CHANGE UP (ref 3.5–5.3)
POTASSIUM SERPL-SCNC: 4.2 MMOL/L — SIGNIFICANT CHANGE UP (ref 3.5–5.3)
POTASSIUM SERPL-SCNC: 4.2 MMOL/L — SIGNIFICANT CHANGE UP (ref 3.5–5.3)
PSA FREE FLD-MCNC: 0.11 NG/ML — SIGNIFICANT CHANGE UP
PSA FREE FLD-MCNC: 0.11 NG/ML — SIGNIFICANT CHANGE UP
PSA FREE FLD-MCNC: 26 % — SIGNIFICANT CHANGE UP
PSA FREE FLD-MCNC: 26 % — SIGNIFICANT CHANGE UP
PSA SERPL-MCNC: 0.44 NG/ML — SIGNIFICANT CHANGE UP (ref 0–4)
PSA SERPL-MCNC: 0.44 NG/ML — SIGNIFICANT CHANGE UP (ref 0–4)
RBC # BLD: 2.34 M/UL — LOW (ref 4.2–5.8)
RBC # BLD: 2.34 M/UL — LOW (ref 4.2–5.8)
RBC # FLD: 15.8 % — HIGH (ref 10.3–14.5)
RBC # FLD: 15.8 % — HIGH (ref 10.3–14.5)
SAO2 % BLDV: 36.3 % — LOW (ref 67–88)
SAO2 % BLDV: 36.3 % — LOW (ref 67–88)
SODIUM SERPL-SCNC: 139 MMOL/L — SIGNIFICANT CHANGE UP (ref 135–145)
SODIUM SERPL-SCNC: 139 MMOL/L — SIGNIFICANT CHANGE UP (ref 135–145)
SPECIMEN SOURCE: SIGNIFICANT CHANGE UP
SPECIMEN SOURCE: SIGNIFICANT CHANGE UP
T3 SERPL-MCNC: 93 NG/DL — SIGNIFICANT CHANGE UP (ref 80–200)
T3 SERPL-MCNC: 93 NG/DL — SIGNIFICANT CHANGE UP (ref 80–200)
T4 AB SER-ACNC: 8.4 UG/DL — SIGNIFICANT CHANGE UP (ref 4.6–12)
T4 AB SER-ACNC: 8.4 UG/DL — SIGNIFICANT CHANGE UP (ref 4.6–12)
TRIGL SERPL-MCNC: 105 MG/DL — SIGNIFICANT CHANGE UP
TRIGL SERPL-MCNC: 105 MG/DL — SIGNIFICANT CHANGE UP
TSH SERPL-MCNC: 1.21 UIU/ML — SIGNIFICANT CHANGE UP (ref 0.27–4.2)
TSH SERPL-MCNC: 1.21 UIU/ML — SIGNIFICANT CHANGE UP (ref 0.27–4.2)
VIT B12 SERPL-MCNC: 747 PG/ML — SIGNIFICANT CHANGE UP (ref 232–1245)
VIT B12 SERPL-MCNC: 747 PG/ML — SIGNIFICANT CHANGE UP (ref 232–1245)
WBC # BLD: 14.57 K/UL — HIGH (ref 3.8–10.5)
WBC # BLD: 14.57 K/UL — HIGH (ref 3.8–10.5)
WBC # FLD AUTO: 14.57 K/UL — HIGH (ref 3.8–10.5)
WBC # FLD AUTO: 14.57 K/UL — HIGH (ref 3.8–10.5)

## 2023-10-29 PROCEDURE — 99222 1ST HOSP IP/OBS MODERATE 55: CPT

## 2023-10-29 RX ORDER — INSULIN LISPRO 100/ML
VIAL (ML) SUBCUTANEOUS
Refills: 0 | Status: DISCONTINUED | OUTPATIENT
Start: 2023-10-29 | End: 2023-10-30

## 2023-10-29 RX ORDER — LOSARTAN POTASSIUM 100 MG/1
25 TABLET, FILM COATED ORAL DAILY
Refills: 0 | Status: DISCONTINUED | OUTPATIENT
Start: 2023-10-29 | End: 2023-10-31

## 2023-10-29 RX ORDER — PREGABALIN 225 MG/1
1000 CAPSULE ORAL DAILY
Refills: 0 | Status: DISCONTINUED | OUTPATIENT
Start: 2023-10-29 | End: 2023-10-31

## 2023-10-29 RX ORDER — INSULIN LISPRO 100/ML
2 VIAL (ML) SUBCUTANEOUS
Refills: 0 | Status: DISCONTINUED | OUTPATIENT
Start: 2023-10-29 | End: 2023-10-29

## 2023-10-29 RX ORDER — PREGABALIN 225 MG/1
1000 CAPSULE ORAL ONCE
Refills: 0 | Status: COMPLETED | OUTPATIENT
Start: 2023-10-29 | End: 2023-10-30

## 2023-10-29 RX ORDER — OXYCODONE HYDROCHLORIDE 5 MG/1
5 TABLET ORAL ONCE
Refills: 0 | Status: DISCONTINUED | OUTPATIENT
Start: 2023-10-29 | End: 2023-10-29

## 2023-10-29 RX ORDER — IRON SUCROSE 20 MG/ML
200 INJECTION, SOLUTION INTRAVENOUS EVERY 24 HOURS
Refills: 0 | Status: DISCONTINUED | OUTPATIENT
Start: 2023-10-29 | End: 2023-10-31

## 2023-10-29 RX ORDER — INSULIN GLARGINE 100 [IU]/ML
28 INJECTION, SOLUTION SUBCUTANEOUS AT BEDTIME
Refills: 0 | Status: DISCONTINUED | OUTPATIENT
Start: 2023-10-29 | End: 2023-10-30

## 2023-10-29 RX ORDER — FOLIC ACID 0.8 MG
1 TABLET ORAL DAILY
Refills: 0 | Status: DISCONTINUED | OUTPATIENT
Start: 2023-10-29 | End: 2023-10-31

## 2023-10-29 RX ORDER — INSULIN LISPRO 100/ML
8 VIAL (ML) SUBCUTANEOUS
Refills: 0 | Status: DISCONTINUED | OUTPATIENT
Start: 2023-10-29 | End: 2023-10-30

## 2023-10-29 RX ORDER — INSULIN GLARGINE 100 [IU]/ML
20 INJECTION, SOLUTION SUBCUTANEOUS AT BEDTIME
Refills: 0 | Status: DISCONTINUED | OUTPATIENT
Start: 2023-10-29 | End: 2023-10-29

## 2023-10-29 RX ORDER — INFLUENZA VIRUS VACCINE 15; 15; 15; 15 UG/.5ML; UG/.5ML; UG/.5ML; UG/.5ML
0.7 SUSPENSION INTRAMUSCULAR ONCE
Refills: 0 | Status: DISCONTINUED | OUTPATIENT
Start: 2023-10-29 | End: 2023-11-02

## 2023-10-29 RX ORDER — INSULIN LISPRO 100/ML
1 VIAL (ML) SUBCUTANEOUS ONCE
Refills: 0 | Status: COMPLETED | OUTPATIENT
Start: 2023-10-29 | End: 2023-10-29

## 2023-10-29 RX ORDER — LIDOCAINE 4 G/100G
1 CREAM TOPICAL THREE TIMES A DAY
Refills: 0 | Status: DISCONTINUED | OUTPATIENT
Start: 2023-10-29 | End: 2023-10-31

## 2023-10-29 RX ORDER — CEFTRIAXONE 500 MG/1
1000 INJECTION, POWDER, FOR SOLUTION INTRAMUSCULAR; INTRAVENOUS EVERY 24 HOURS
Refills: 0 | Status: DISCONTINUED | OUTPATIENT
Start: 2023-10-29 | End: 2023-10-31

## 2023-10-29 RX ORDER — INSULIN LISPRO 100/ML
9 VIAL (ML) SUBCUTANEOUS
Refills: 0 | Status: DISCONTINUED | OUTPATIENT
Start: 2023-10-29 | End: 2023-10-29

## 2023-10-29 RX ORDER — INSULIN LISPRO 100/ML
VIAL (ML) SUBCUTANEOUS AT BEDTIME
Refills: 0 | Status: DISCONTINUED | OUTPATIENT
Start: 2023-10-29 | End: 2023-10-29

## 2023-10-29 RX ORDER — INSULIN GLARGINE 100 [IU]/ML
32 INJECTION, SOLUTION SUBCUTANEOUS AT BEDTIME
Refills: 0 | Status: DISCONTINUED | OUTPATIENT
Start: 2023-10-29 | End: 2023-10-29

## 2023-10-29 RX ORDER — INSULIN LISPRO 100/ML
VIAL (ML) SUBCUTANEOUS
Refills: 0 | Status: DISCONTINUED | OUTPATIENT
Start: 2023-10-29 | End: 2023-10-29

## 2023-10-29 RX ORDER — INFLUENZA VIRUS VACCINE 15; 15; 15; 15 UG/.5ML; UG/.5ML; UG/.5ML; UG/.5ML
0.5 SUSPENSION INTRAMUSCULAR ONCE
Refills: 0 | Status: DISCONTINUED | OUTPATIENT
Start: 2023-10-29 | End: 2023-10-29

## 2023-10-29 RX ADMIN — LIDOCAINE 1 APPLICATION(S): 4 CREAM TOPICAL at 21:08

## 2023-10-29 RX ADMIN — IRON SUCROSE 200 MILLIGRAM(S): 20 INJECTION, SOLUTION INTRAVENOUS at 21:35

## 2023-10-29 RX ADMIN — Medication 5: at 17:57

## 2023-10-29 RX ADMIN — Medication 2 UNIT(S): at 09:28

## 2023-10-29 RX ADMIN — Medication 5: at 13:27

## 2023-10-29 RX ADMIN — Medication 9 UNIT(S): at 17:56

## 2023-10-29 RX ADMIN — Medication 5: at 09:28

## 2023-10-29 RX ADMIN — SODIUM CHLORIDE 100 MILLILITER(S): 9 INJECTION INTRAMUSCULAR; INTRAVENOUS; SUBCUTANEOUS at 08:54

## 2023-10-29 RX ADMIN — Medication 81 MILLIGRAM(S): at 12:28

## 2023-10-29 RX ADMIN — LIDOCAINE 1 APPLICATION(S): 4 CREAM TOPICAL at 13:22

## 2023-10-29 RX ADMIN — LIDOCAINE 1 APPLICATION(S): 4 CREAM TOPICAL at 02:35

## 2023-10-29 RX ADMIN — INSULIN GLARGINE 28 UNIT(S): 100 INJECTION, SOLUTION SUBCUTANEOUS at 21:07

## 2023-10-29 RX ADMIN — CEFTRIAXONE 100 MILLIGRAM(S): 500 INJECTION, POWDER, FOR SOLUTION INTRAMUSCULAR; INTRAVENOUS at 13:10

## 2023-10-29 RX ADMIN — OXYCODONE HYDROCHLORIDE 5 MILLIGRAM(S): 5 TABLET ORAL at 11:20

## 2023-10-29 RX ADMIN — OXYCODONE HYDROCHLORIDE 5 MILLIGRAM(S): 5 TABLET ORAL at 10:22

## 2023-10-29 RX ADMIN — Medication 2 UNIT(S): at 13:28

## 2023-10-29 RX ADMIN — Medication 4: at 21:08

## 2023-10-29 RX ADMIN — Medication 1 UNIT(S): at 00:47

## 2023-10-29 NOTE — PROGRESS NOTE ADULT - SUBJECTIVE AND OBJECTIVE BOX
Subjective  No acute events overnight. Patient only complaint is penile burning. Otherwise feels relief after irrigation yesterday. Denies flank pain    Objective    Vital signs  T(F): , Max: 99.4 (10-29-23 @ 06:24)  HR: 80 (10-29-23 @ 06:24)  BP: 146/93 (10-29-23 @ 06:24)  SpO2: 93% (10-29-23 @ 06:24)  Wt(kg): --    Output         Gen: NAD  Abd: soft, nontender, nondistended  : fisher secured in place, attempted to clamp CBI but color worsened to clear punch. CBI restarted to a clear pink.     Labs      10-28 @ 01:34    WBC 11.61 / Hct 25.8  / SCr 1.34         Culture - Urine (collected 10-28-23 @ 01:51)  Source: Clean Catch Clean Catch (Midstream)  Final Report (10-29-23 @ 08:02):    <10,000 CFU/mL Normal Urogenital Sol

## 2023-10-29 NOTE — PROVIDER CONTACT NOTE (OTHER) - ACTION/TREATMENT ORDERED:
fingerstick rechecked, blood sugar is 305. As per ACP Lluvia, give pt 1 unit of Admelog
Provider aware, RN put pt on NC @3L. Pt sating in the 90s. No other interventons from provider. Plan of care continues.

## 2023-10-29 NOTE — PROVIDER CONTACT NOTE (OTHER) - RECOMMENDATIONS
NICOLE Torres notifed and made aware
NICOLE Teixeira notified regarding whether patient should receive any additional insulin
Provider awared, NC @2L?

## 2023-10-29 NOTE — PROVIDER CONTACT NOTE (OTHER) - SITUATION
pt arrived to 9 [address] with fisher in place with CBI, no order active for fisher and CBI
Pt o2 sat in the 80s, pt asymptomatic no complaints. All other VSS
pt received from ED, pt fingerstick prior to coming to [address] [place] [address] was 294, pt received 20 units of lantus prior to admission to [address] [place] [address]

## 2023-10-29 NOTE — PROVIDER CONTACT NOTE (OTHER) - REASON
Pt o2 sat in the 80s, pt asymptomatic no complaints. All other VSS
pt received from ED, pt fingerstick prior to coming to [address] [place] [address] was 294, pt received 20 units of lantus prior to admission to [address] [place] [address]
pt arrived to 9 [address] with fisher in place with CBI, no order active for fisher and CBI

## 2023-10-29 NOTE — PROVIDER CONTACT NOTE (OTHER) - BACKGROUND
Pt was admitted on 10/28/23 with hematuria x 2 weeks. Pt had fisher placed in ED with CBI
Pt o2 sat in the 80s, pt asymptomatic no complaints. All other VSS
Pt was admitted on 10/28/23 with hematuria x 2 weeks. Pt had fisher placed in ED with CBI

## 2023-10-29 NOTE — ED PROVIDER NOTE - NSICDXPASTSURGICALHX_GEN_ALL_CORE_FT
82 years old female with h/o HTN, HLD, DM was brought in to ED for medical evaluation. Per [facility] clinical manager, patient has not seen any docotro for more than 1 years. Per aid Vanessa [phone], patient has not been eating and not taking meds. Patient is renting a room with other people. Patient denied any nausea, vomiting, diarrhea, urinary symptoms. Patient is A&O x 2 and relatively a poor historian.   Tachycardic upon arrival, BP stable, afebrile, sat well at RA. EKG with sinus tachy. No leukocytosis, plt 419, ddimer 1627, K 4.2, Cr 0.75, glucose 289, serum acetone small. Flu/COVID negative. CT head with no acute pathology. CTA with no PE. No focal consolidation 82 years old female with h/o HTN, HLD, DM was brought in to ED for medical evaluation. Per [facility] clinical manager, patient has not seen any docotro for more than 1 years. Per aid Vanessa [phone], patient has not been eating and not taking meds. Patient is renting a room with other people. Patient denied any nausea, vomiting, diarrhea, urinary symptoms. Patient is A&O x 2 and relatively a poor historian.   Tachycardic upon arrival, BP stable, afebrile, sat well at RA. EKG with sinus tachy. No leukocytosis, plt 419, ddimer 1627, K 4.2, Cr 0.75, glucose 289, serum acetone small. Flu/COVID negative. CT head with no acute pathology. CTA with no PE. No focal consolidation 82 years old female with h/o HTN, HLD, DM was brought in to ED for medical evaluation. Per [facility] clinical manager, patient has not seen any docotro for more than 1 years. Per aid Vanessa [phone], patient has not been eating and not taking meds. Patient is renting a room with other people. Patient denied any nausea, vomiting, diarrhea, urinary symptoms. Patient is A&O x 2 and relatively a poor historian.   Tachycardic upon arrival, BP stable, afebrile, sat well at RA. EKG with sinus tachy. No leukocytosis, plt 419, ddimer 1627, K 4.2, Cr 0.75, glucose 289, serum acetone small. Flu/COVID negative. CT head with no acute pathology. CTA with no PE. No focal consolidation 82 years old female with h/o HTN, HLD, DM was brought in to ED for medical evaluation. Per [facility] clinical manager, patient has not seen any docotro for more than 1 years. Per aid Vanessa [phone], patient has not been eating and not taking meds. Patient is renting a room with other people. Patient denied any nausea, vomiting, diarrhea, urinary symptoms. Patient is A&O x 2 and relatively a poor historian.   Tachycardic upon arrival, BP stable, afebrile, sat well at RA. EKG with sinus tachy. No leukocytosis, plt 419, ddimer 1627, K 4.2, Cr 0.75, glucose 289, serum acetone small. Flu/COVID negative. CT head with no acute pathology. CTA with no PE. No focal consolidation PAST SURGICAL HISTORY:  No significant past surgical history

## 2023-10-29 NOTE — PROGRESS NOTE ADULT - ASSESSMENT
63y old Male with no PMHx, and has not seen a PMD recently who presents with painless hematuria for 2 weeks, passing clots, not on AC.    Denies any lightheadedness, dizziness, shortness of breath, abdominal pain. Feels a little bit of "retaining feeling" however has been urinating normally and spontaneously. Denies any fever/chills, abdominal pain, flank pain. No nausea/vomiting. No prostate history. No cancer history however has not had a regular check up with doctors in a while.  He states that he noticed increased difficulty voiding and pain at the tip of the penis with the bleeding.   denies any flank pain, nausea/vomiting, fevers, chills.    In the ER ptn was seen by , CBI placed, ct A/P done, ptn is noted to be hyperglycemic, and anemic w elevated MCV      A/P  Hematuria, w clots, gross, improving w CBI, catheter was reirrigated today and replaced. on prophylactic CTX 2/2 manipulation w CBI   planning for cystoscopy on 10/31:  removing clot burden and fulguration   penile burning, use lidocaine  start flomax  3mm UVJ stone, L  no flank pain, pain free    new onset DM. FS remain elevated but improved, ENDO input appreciated, insulin dosages adjusted. HA1C 11%    anemia, Iron level is low.  will need non emergent GI work up and heme eval once  issues stabilize. Vit B12 levels on the low side, will start daily supplementation. start 1 gm IV Venofer over 5 days, start Folate supplementation.    trend H/H    BP is elevated, will start small dose Losartan.    medically cleared for cystoscopy    DVT ppx w SCD

## 2023-10-29 NOTE — CONSULT NOTE ADULT - ASSESSMENT
Assessment  DMT2: 63y Male with newly diagnosed DM T2 with hyperglycemia admitted with hematuria, A1C is 10.2, blood sugars are running high, eating meals, compliant with low carb diet.  Patient is high risk with high level decision making due to uncontrolled diabetes, has increased risk for complications.   Hematuria: Being evaluated and monitored, asymptomatic.        Essence Dominguez MD  Cell:  [phone]  Office: [phone]

## 2023-10-29 NOTE — PATIENT PROFILE ADULT - FALL HARM RISK - HARM RISK INTERVENTIONS

## 2023-10-29 NOTE — PROGRESS NOTE ADULT - ASSESSMENT
63 year old male with limited past medical hx admitted for hematuria, clots; L 3mm UVJ calculus. Patient without flank pain and afebrile, medical expulsive therapy for stone management.     Plan  -continue CBI and wean as tolerated->weaned to medium fast drip today  - can continue lidocaine jelly for penile pain  - start flomax  - f/u H&H, transfuse as needed  - trend Cr  -IV fluids  -rest of care per medicine   63 year old male with limited past medical hx admitted for hematuria, clots; L 3mm UVJ calculus. Patient without flank pain and afebrile, medical expulsive therapy for stone management.     Plan  -continue CBI and wean as tolerated->weaned to medium fast drip today  - can continue lidocaine jelly for penile pain  - recommend starting CTX for 3 days given manipulation with irrigation  - start flomax  - f/u H&H, transfuse as needed  - trend Cr  -IV fluids  -rest of care per medicine   63 year old male with limited past medical hx admitted for hematuria, clots; L 3mm UVJ calculus. Patient without flank pain and afebrile, medical expulsive therapy for stone management.     Plan  -continue CBI and wean as tolerated->re-irrigated and replaced catheter   - can continue lidocaine jelly for penile pain  - recommend starting CTX for 3 days given manipulation with irrigation  - start flomax  - f/u H&H, transfuse as needed  - trend Cr  -IV fluids  - Tentative plan for OR on Tuesday for cystoscopy clot evacuation   - Please document Medical and cardiac clearance for OR   -rest of care per medicine

## 2023-10-29 NOTE — CONSULT NOTE ADULT - PROBLEM SELECTOR RECOMMENDATION 9
Will start Lantus 24 units at bed time.  Will start Admelog 8 units before each meal in addition to Admelog correction scale coverage.  Patient counseled for compliance with consistent low carb diet.  . Will start Lantus 28 units at bed time.  Will start Admelog 8 units before each meal in addition to Admelog correction scale coverage.  Patient counseled for compliance with consistent low carb diet.  .

## 2023-10-29 NOTE — CONSULT NOTE ADULT - SUBJECTIVE AND OBJECTIVE BOX
HPI:  63y old Male with no PMHx, and has not seen a PMD recently who presents with painless hematuria for 2 weeks, passing clots, not on AC.    Denies any lightheadedness, dizziness, shortness of breath, abdominal pain. Feels a little bit of "retaining feeling" however has been urinating normally and spontaneously. Denies any fever/chills, abdominal pain, flank pain. No nausea/vomiting. No prostate history. No cancer history however has not had a regular check up with doctors in a while.  He states that he noticed increased difficulty voiding and pain at the tip of the penis with the bleeding.   denies any flank pain, nausea/vomiting, fevers, chills.    In the ER ptn was seen by , CBI placed, ct A/P done, ptn is noted to be hyperglycemic     (28 Oct 2023 16:21)  Patient has history of pre-diabetes, was not on any hypoglycemic agents at home. Patient follows up with PCP for health diabetes management.    PAST MEDICAL & SURGICAL HISTORY:  No pertinent past medical history      No significant past surgical history          FAMILY HISTORY:      Social History:    Outpatient Medications:    MEDICATIONS  (STANDING):  aspirin enteric coated 81 milliGRAM(s) Oral daily  cefTRIAXone   IVPB 1000 milliGRAM(s) IV Intermittent every 24 hours  dextrose 5%. 1000 milliLiter(s) (50 mL/Hr) IV Continuous <Continuous>  dextrose 5%. 1000 milliLiter(s) (100 mL/Hr) IV Continuous <Continuous>  dextrose 50% Injectable 12.5 Gram(s) IV Push once  dextrose 50% Injectable 25 Gram(s) IV Push once  dextrose 50% Injectable 25 Gram(s) IV Push once  glucagon  Injectable 1 milliGRAM(s) IntraMuscular once  influenza   Vaccine 0.5 milliLiter(s) IntraMuscular once  insulin glargine Injectable (LANTUS) 32 Unit(s) SubCutaneous at bedtime  insulin lispro (ADMELOG) corrective regimen sliding scale   SubCutaneous three times a day before meals  insulin lispro (ADMELOG) corrective regimen sliding scale   SubCutaneous three times a day before meals  insulin lispro Injectable (ADMELOG) 9 Unit(s) SubCutaneous three times a day before meals  lidocaine 2% Gel 1 Application(s) Topical three times a day  sodium chloride 0.9%. 1000 milliLiter(s) (100 mL/Hr) IV Continuous <Continuous>    MEDICATIONS  (PRN):  acetaminophen     Tablet .. 650 milliGRAM(s) Oral every 6 hours PRN Temp greater or equal to 38C (100.4F), Mild Pain (1 - 3)  dextrose Oral Gel 15 Gram(s) Oral once PRN Blood Glucose LESS THAN 70 milliGRAM(s)/deciliter  morphine  - Injectable 4 milliGRAM(s) IV Push every 6 hours PRN Severe Pain (7 - 10)      Allergies    No Known Allergies    Intolerances      Review of Systems:  UNABLE TO OBTAIN  Cardiovascular: No chest pain, no palpitations  Respiratory: No wheezing, no cough  GI: No nausea, no vomiting  : No dysuria        ALL OTHER SYSTEMS REVIEWED AND NEGATIVE    PHYSICAL EXAM:  VITALS: T(C): 37.4 (10-29-23 @ 17:07)  T(F): 99.4 (10-29-23 @ 17:07), Max: 99.4 (10-29-23 @ 06:24)  HR: 83 (10-29-23 @ 17:07) (78 - 87)  BP: 136/72 (10-29-23 @ 17:07) (122/74 - 165/89)  RR:  (18 - 18)  SpO2:  (91% - 96%)  Wt(kg): --  THYROID: Normal size, no palpable nodules  RESPIRATORY: Clear to auscultation bilaterally.  CARDIOVASCULAR: Si S2, No murmurs;  GI: Soft, non distended.      POCT Blood Glucose.: 361 mg/dL (10-29-23 @ 17:30)  POCT Blood Glucose.: 370 mg/dL (10-29-23 @ 12:47)  POCT Blood Glucose.: 352 mg/dL (10-29-23 @ 09:00)  POCT Blood Glucose.: 305 mg/dL (10-29-23 @ 00:46)  POCT Blood Glucose.: 294 mg/dL (10-28-23 @ 23:14)  POCT Blood Glucose.: 377 mg/dL (10-28-23 @ 06:53)  POCT Blood Glucose.: 402 mg/dL (10-28-23 @ 06:20)                            7.8    14.57 )-----------( 173      ( 29 Oct 2023 08:33 )             24.1       10-29    139  |  104  |  12  ----------------------------<  325<H>  4.2   |  26  |  1.13    eGFR: 73    Ca    8.0<L>      10-29    TPro  6.7  /  Alb  3.5  /  TBili  0.5  /  DBili  0.2  /  AST  39  /  ALT  26  /  AlkPhos  100  10-28      Thyroid Function Tests:  10-29 @ 08:33 TSH 1.21 FreeT4 -- T3 93 Anti TPO -- Anti Thyroglobulin Ab -- TSI --      10-29 Chol 106 Direct LDL -- LDL calculated 61 HDL 25<L> Trig 105    Radiology:

## 2023-10-29 NOTE — PROGRESS NOTE ADULT - SUBJECTIVE AND OBJECTIVE BOX
Subjective: Patient seen and examined. No new events except as noted.   pain in penis tip     REVIEW OF SYSTEMS:    CONSTITUTIONAL: No weakness, fevers or chills  EYES/ENT: No visual changes;  No vertigo or throat pain   NECK: No pain or stiffness  RESPIRATORY: No cough, wheezing, hemoptysis; No shortness of breath  CARDIOVASCULAR: No chest pain or palpitations  GASTROINTESTINAL: No abdominal or epigastric pain. No nausea, vomiting, or hematemesis; No diarrhea or constipation. No melena or hematochezia.  GENITOURINARY: No dysuria, frequency or hematuria  NEUROLOGICAL: No numbness or weakness  SKIN: No itching, burning, rashes, or lesions   All other review of systems is negative unless indicated above.    MEDICATIONS:  MEDICATIONS  (STANDING):  aspirin enteric coated 81 milliGRAM(s) Oral daily  dextrose 5%. 1000 milliLiter(s) (50 mL/Hr) IV Continuous <Continuous>  dextrose 5%. 1000 milliLiter(s) (100 mL/Hr) IV Continuous <Continuous>  dextrose 50% Injectable 12.5 Gram(s) IV Push once  dextrose 50% Injectable 25 Gram(s) IV Push once  dextrose 50% Injectable 25 Gram(s) IV Push once  glucagon  Injectable 1 milliGRAM(s) IntraMuscular once  influenza   Vaccine 0.5 milliLiter(s) IntraMuscular once  insulin glargine Injectable (LANTUS) 20 Unit(s) SubCutaneous at bedtime  insulin lispro (ADMELOG) corrective regimen sliding scale   SubCutaneous three times a day before meals  insulin lispro (ADMELOG) corrective regimen sliding scale   SubCutaneous at bedtime  insulin lispro Injectable (ADMELOG) 2 Unit(s) SubCutaneous three times a day before meals  lidocaine 2% Gel 1 Application(s) Topical three times a day  sodium chloride 0.9%. 1000 milliLiter(s) (100 mL/Hr) IV Continuous <Continuous>      PHYSICAL EXAM:  T(C): 37.4 (10-29-23 @ 06:24), Max: 37.4 (10-29-23 @ 06:24)  HR: 80 (10-29-23 @ 06:24) (78 - 84)  BP: 146/93 (10-29-23 @ 06:24) (140/78 - 165/89)  RR: 18 (10-29-23 @ 06:24) (16 - 18)  SpO2: 93% (10-29-23 @ 06:24) (93% - 96%)  Wt(kg): --  I&O's Summary    28 Oct 2023 07:01  -  29 Oct 2023 07:00  --------------------------------------------------------  IN: 14205 mL / OUT: 98920 mL / NET: 840 mL          Appearance: Normal	  HEENT:   Normal oral mucosa, PERRL, EOMI	  Lymphatic: No lymphadenopathy , no edema  Cardiovascular: Normal S1 S2, No JVD, No murmurs , Peripheral pulses palpable 2+ bilaterally  Respiratory: Lungs clear to auscultation, normal effort 	  Gastrointestinal:  Soft, Non-tender, + BS	  Skin: No rashes, No ecchymoses, No cyanosis, warm to touch  Musculoskeletal: Normal range of motion, normal strength  Psychiatry:  Mood & affect appropriate  Ext: 1+ edema L > r   chronic venous stasis skin discoloration   +CBI     LABS:    CARDIAC MARKERS:                                7.8    14.57 )-----------( 173      ( 29 Oct 2023 08:33 )             24.1     10-28    134<L>  |  99  |  16  ----------------------------<  477<HH>  4.4   |  22  |  1.34<H>    Ca    8.8      28 Oct 2023 01:34    TPro  6.7  /  Alb  3.5  /  TBili  0.5  /  DBili  0.2  /  AST  39  /  ALT  26  /  AlkPhos  100  10-28    proBNP:   Lipid Profile:   HgA1c:   TSH:     15          TELEMETRY: 	    ECG:  	  RADIOLOGY:   DIAGNOSTIC TESTING:  [ ] Echocardiogram:  [ ]  Catheterization:  [ ] Stress Test:    OTHER:

## 2023-10-29 NOTE — PROGRESS NOTE ADULT - SUBJECTIVE AND OBJECTIVE BOX
Patient is a 63y old  Male who presents with a chief complaint of     SUBJECTIVE / OVERNIGHT EVENTS: FS remain elevated but improved, ENDO input appreciated, inulin dosages adjusted. Iron level is low, ptn is anemic, will need non emergent GI work up and heme eval once  issues stabilize. Vit B12 levels on the low side, will start daily supplementation. start 1 gm IV Venofer over 5 days, start Folate supplementation. hematuria is ongoing but overall improved.  planning for cystoscopy, removing clot burden and fulguration. ptn has a renal stone. on prophylactic CTX 2/2 manipulation w CBI. BP is elevated, will start small dose Losartan.     MEDICATIONS  (STANDING):  aspirin enteric coated 81 milliGRAM(s) Oral daily  cefTRIAXone   IVPB 1000 milliGRAM(s) IV Intermittent every 24 hours  cyanocobalamin 1000 MICROGram(s) Oral daily  cyanocobalamin Injectable 1000 MICROGram(s) SubCutaneous once  dextrose 5%. 1000 milliLiter(s) (50 mL/Hr) IV Continuous <Continuous>  dextrose 5%. 1000 milliLiter(s) (100 mL/Hr) IV Continuous <Continuous>  dextrose 50% Injectable 12.5 Gram(s) IV Push once  dextrose 50% Injectable 25 Gram(s) IV Push once  dextrose 50% Injectable 25 Gram(s) IV Push once  folic acid 1 milliGRAM(s) Oral daily  glucagon  Injectable 1 milliGRAM(s) IntraMuscular once  influenza  Vaccine (HIGH DOSE) 0.7 milliLiter(s) IntraMuscular once  insulin glargine Injectable (LANTUS) 28 Unit(s) SubCutaneous at bedtime  insulin lispro (ADMELOG) corrective regimen sliding scale   SubCutaneous Before meals and at bedtime  insulin lispro (ADMELOG) corrective regimen sliding scale   SubCutaneous three times a day before meals  insulin lispro Injectable (ADMELOG) 8 Unit(s) SubCutaneous three times a day before meals  iron sucrose Injectable 200 milliGRAM(s) IV Push every 24 hours  lidocaine 2% Gel 1 Application(s) Topical three times a day  sodium chloride 0.9%. 1000 milliLiter(s) (100 mL/Hr) IV Continuous <Continuous>    MEDICATIONS  (PRN):  acetaminophen     Tablet .. 650 milliGRAM(s) Oral every 6 hours PRN Temp greater or equal to 38C (100.4F), Mild Pain (1 - 3)  dextrose Oral Gel 15 Gram(s) Oral once PRN Blood Glucose LESS THAN 70 milliGRAM(s)/deciliter  morphine  - Injectable 4 milliGRAM(s) IV Push every 6 hours PRN Severe Pain (7 - 10)      Vital Signs Last 24 Hrs  T(F): 99.4 (10-29-23 @ 17:07), Max: 99.4 (10-29-23 @ 06:24)  HR: 83 (10-29-23 @ 17:07) (80 - 87)  BP: 136/72 (10-29-23 @ 17:07) (122/74 - 165/89)  RR: 18 (10-29-23 @ 17:07) (18 - 18)  SpO2: 93% (10-29-23 @ 17:07) (91% - 95%)  Telemetry:   CAPILLARY BLOOD GLUCOSE      POCT Blood Glucose.: 361 mg/dL (29 Oct 2023 17:30)  POCT Blood Glucose.: 370 mg/dL (29 Oct 2023 12:47)  POCT Blood Glucose.: 352 mg/dL (29 Oct 2023 09:00)  POCT Blood Glucose.: 305 mg/dL (29 Oct 2023 00:46)  POCT Blood Glucose.: 294 mg/dL (28 Oct 2023 23:14)    I&O's Summary    28 Oct 2023 07:01  -  29 Oct 2023 07:00  --------------------------------------------------------  IN: 94714 mL / OUT: 85962 mL / NET: 840 mL    29 Oct 2023 07:01  -  29 Oct 2023 20:33  --------------------------------------------------------  IN: 42360 mL / OUT: 84276 mL / NET: 1360 mL        PHYSICAL EXAM:  GENERAL: NAD, well-developed  HEAD:  Atraumatic, Normocephalic  EYES: EOMI, PERRLA, conjunctiva and sclera clear  NECK: Supple, No JVD  CHEST/LUNG: Clear to auscultation bilaterally; No wheeze  HEART: Regular rate and rhythm; No murmurs, rubs, or gallops  ABDOMEN: Soft, Nontender, Nondistended; Bowel sounds present  EXTREMITIES:  2+ Peripheral Pulses, No clubbing, cyanosis, or edema  PSYCH: AAOx3  NEUROLOGY: non-focal  SKIN: No rashes or lesions    LABS:                        7.8    14.57 )-----------( 173      ( 29 Oct 2023 08:33 )             24.1     10-29    139  |  104  |  12  ----------------------------<  325<H>  4.2   |  26  |  1.13    Ca    8.0<L>      29 Oct 2023 08:33    TPro  6.7  /  Alb  3.5  /  TBili  0.5  /  DBili  0.2  /  AST  39  /  ALT  26  /  AlkPhos  100  10-28    PT/INR - ( 28 Oct 2023 01:34 )   PT: 12.2 sec;   INR: 1.11 ratio         PTT - ( 28 Oct 2023 01:34 )  PTT:26.8 sec      Urinalysis Basic - ( 29 Oct 2023 08:33 )    Color: x / Appearance: x / SG: x / pH: x  Gluc: 325 mg/dL / Ketone: x  / Bili: x / Urobili: x   Blood: x / Protein: x / Nitrite: x   Leuk Esterase: x / RBC: x / WBC x   Sq Epi: x / Non Sq Epi: x / Bacteria: x        RADIOLOGY & ADDITIONAL TESTS:    Imaging Personally Reviewed:    Consultant(s) Notes Reviewed:      Care Discussed with Consultants/Other Providers:

## 2023-10-30 ENCOUNTER — TRANSCRIPTION ENCOUNTER (OUTPATIENT)
Age: 63
End: 2023-10-30

## 2023-10-30 DIAGNOSIS — R31.0 GROSS HEMATURIA: ICD-10-CM

## 2023-10-30 PROBLEM — Z00.00 ENCOUNTER FOR PREVENTIVE HEALTH EXAMINATION: Status: ACTIVE | Noted: 2023-10-30

## 2023-10-30 LAB
ANION GAP SERPL CALC-SCNC: 10 MMOL/L — SIGNIFICANT CHANGE UP (ref 5–17)
ANION GAP SERPL CALC-SCNC: 10 MMOL/L — SIGNIFICANT CHANGE UP (ref 5–17)
ANION GAP SERPL CALC-SCNC: 9 MMOL/L — SIGNIFICANT CHANGE UP (ref 5–17)
ANION GAP SERPL CALC-SCNC: 9 MMOL/L — SIGNIFICANT CHANGE UP (ref 5–17)
APTT BLD: 25.3 SEC — SIGNIFICANT CHANGE UP (ref 24.5–35.6)
APTT BLD: 25.3 SEC — SIGNIFICANT CHANGE UP (ref 24.5–35.6)
BLD GP AB SCN SERPL QL: NEGATIVE — SIGNIFICANT CHANGE UP
BLD GP AB SCN SERPL QL: NEGATIVE — SIGNIFICANT CHANGE UP
BUN SERPL-MCNC: 14 MG/DL — SIGNIFICANT CHANGE UP (ref 7–23)
BUN SERPL-MCNC: 14 MG/DL — SIGNIFICANT CHANGE UP (ref 7–23)
BUN SERPL-MCNC: 15 MG/DL — SIGNIFICANT CHANGE UP (ref 7–23)
BUN SERPL-MCNC: 15 MG/DL — SIGNIFICANT CHANGE UP (ref 7–23)
CALCIUM SERPL-MCNC: 7.7 MG/DL — LOW (ref 8.4–10.5)
CALCIUM SERPL-MCNC: 7.7 MG/DL — LOW (ref 8.4–10.5)
CALCIUM SERPL-MCNC: 8 MG/DL — LOW (ref 8.4–10.5)
CALCIUM SERPL-MCNC: 8 MG/DL — LOW (ref 8.4–10.5)
CHLORIDE SERPL-SCNC: 101 MMOL/L — SIGNIFICANT CHANGE UP (ref 96–108)
CHLORIDE SERPL-SCNC: 101 MMOL/L — SIGNIFICANT CHANGE UP (ref 96–108)
CHLORIDE SERPL-SCNC: 102 MMOL/L — SIGNIFICANT CHANGE UP (ref 96–108)
CHLORIDE SERPL-SCNC: 102 MMOL/L — SIGNIFICANT CHANGE UP (ref 96–108)
CO2 SERPL-SCNC: 24 MMOL/L — SIGNIFICANT CHANGE UP (ref 22–31)
CO2 SERPL-SCNC: 24 MMOL/L — SIGNIFICANT CHANGE UP (ref 22–31)
CO2 SERPL-SCNC: 25 MMOL/L — SIGNIFICANT CHANGE UP (ref 22–31)
CO2 SERPL-SCNC: 25 MMOL/L — SIGNIFICANT CHANGE UP (ref 22–31)
CREAT SERPL-MCNC: 1.13 MG/DL — SIGNIFICANT CHANGE UP (ref 0.5–1.3)
CREAT SERPL-MCNC: 1.13 MG/DL — SIGNIFICANT CHANGE UP (ref 0.5–1.3)
CREAT SERPL-MCNC: 1.25 MG/DL — SIGNIFICANT CHANGE UP (ref 0.5–1.3)
CREAT SERPL-MCNC: 1.25 MG/DL — SIGNIFICANT CHANGE UP (ref 0.5–1.3)
EGFR: 65 ML/MIN/1.73M2 — SIGNIFICANT CHANGE UP
EGFR: 65 ML/MIN/1.73M2 — SIGNIFICANT CHANGE UP
EGFR: 73 ML/MIN/1.73M2 — SIGNIFICANT CHANGE UP
EGFR: 73 ML/MIN/1.73M2 — SIGNIFICANT CHANGE UP
GLUCOSE BLDC GLUCOMTR-MCNC: 234 MG/DL — HIGH (ref 70–99)
GLUCOSE BLDC GLUCOMTR-MCNC: 234 MG/DL — HIGH (ref 70–99)
GLUCOSE BLDC GLUCOMTR-MCNC: 260 MG/DL — HIGH (ref 70–99)
GLUCOSE BLDC GLUCOMTR-MCNC: 260 MG/DL — HIGH (ref 70–99)
GLUCOSE BLDC GLUCOMTR-MCNC: 261 MG/DL — HIGH (ref 70–99)
GLUCOSE BLDC GLUCOMTR-MCNC: 261 MG/DL — HIGH (ref 70–99)
GLUCOSE BLDC GLUCOMTR-MCNC: 283 MG/DL — HIGH (ref 70–99)
GLUCOSE BLDC GLUCOMTR-MCNC: 283 MG/DL — HIGH (ref 70–99)
GLUCOSE SERPL-MCNC: 218 MG/DL — HIGH (ref 70–99)
GLUCOSE SERPL-MCNC: 218 MG/DL — HIGH (ref 70–99)
GLUCOSE SERPL-MCNC: 222 MG/DL — HIGH (ref 70–99)
GLUCOSE SERPL-MCNC: 222 MG/DL — HIGH (ref 70–99)
HCT VFR BLD CALC: 20.6 % — CRITICAL LOW (ref 39–50)
HCT VFR BLD CALC: 20.6 % — CRITICAL LOW (ref 39–50)
HCT VFR BLD CALC: 22.1 % — LOW (ref 39–50)
HCT VFR BLD CALC: 22.1 % — LOW (ref 39–50)
HCT VFR BLD CALC: 24.4 % — LOW (ref 39–50)
HCT VFR BLD CALC: 24.4 % — LOW (ref 39–50)
HGB BLD-MCNC: 6.5 G/DL — CRITICAL LOW (ref 13–17)
HGB BLD-MCNC: 6.5 G/DL — CRITICAL LOW (ref 13–17)
HGB BLD-MCNC: 6.9 G/DL — CRITICAL LOW (ref 13–17)
HGB BLD-MCNC: 6.9 G/DL — CRITICAL LOW (ref 13–17)
HGB BLD-MCNC: 7.9 G/DL — LOW (ref 13–17)
HGB BLD-MCNC: 7.9 G/DL — LOW (ref 13–17)
INR BLD: 1.23 RATIO — HIGH (ref 0.85–1.18)
INR BLD: 1.23 RATIO — HIGH (ref 0.85–1.18)
MCHC RBC-ENTMCNC: 31.2 GM/DL — LOW (ref 32–36)
MCHC RBC-ENTMCNC: 31.2 GM/DL — LOW (ref 32–36)
MCHC RBC-ENTMCNC: 31.6 GM/DL — LOW (ref 32–36)
MCHC RBC-ENTMCNC: 31.6 GM/DL — LOW (ref 32–36)
MCHC RBC-ENTMCNC: 32.4 GM/DL — SIGNIFICANT CHANGE UP (ref 32–36)
MCHC RBC-ENTMCNC: 32.4 GM/DL — SIGNIFICANT CHANGE UP (ref 32–36)
MCHC RBC-ENTMCNC: 32.5 PG — SIGNIFICANT CHANGE UP (ref 27–34)
MCHC RBC-ENTMCNC: 32.6 PG — SIGNIFICANT CHANGE UP (ref 27–34)
MCHC RBC-ENTMCNC: 32.6 PG — SIGNIFICANT CHANGE UP (ref 27–34)
MCV RBC AUTO: 100.8 FL — HIGH (ref 80–100)
MCV RBC AUTO: 100.8 FL — HIGH (ref 80–100)
MCV RBC AUTO: 103 FL — HIGH (ref 80–100)
MCV RBC AUTO: 103 FL — HIGH (ref 80–100)
MCV RBC AUTO: 104.2 FL — HIGH (ref 80–100)
MCV RBC AUTO: 104.2 FL — HIGH (ref 80–100)
NRBC # BLD: 0 /100 WBCS — SIGNIFICANT CHANGE UP (ref 0–0)
PLATELET # BLD AUTO: 137 K/UL — LOW (ref 150–400)
PLATELET # BLD AUTO: 137 K/UL — LOW (ref 150–400)
PLATELET # BLD AUTO: 139 K/UL — LOW (ref 150–400)
PLATELET # BLD AUTO: 139 K/UL — LOW (ref 150–400)
PLATELET # BLD AUTO: 146 K/UL — LOW (ref 150–400)
PLATELET # BLD AUTO: 146 K/UL — LOW (ref 150–400)
POTASSIUM SERPL-MCNC: 3.4 MMOL/L — LOW (ref 3.5–5.3)
POTASSIUM SERPL-MCNC: 3.4 MMOL/L — LOW (ref 3.5–5.3)
POTASSIUM SERPL-MCNC: 3.6 MMOL/L — SIGNIFICANT CHANGE UP (ref 3.5–5.3)
POTASSIUM SERPL-MCNC: 3.6 MMOL/L — SIGNIFICANT CHANGE UP (ref 3.5–5.3)
POTASSIUM SERPL-SCNC: 3.4 MMOL/L — LOW (ref 3.5–5.3)
POTASSIUM SERPL-SCNC: 3.4 MMOL/L — LOW (ref 3.5–5.3)
POTASSIUM SERPL-SCNC: 3.6 MMOL/L — SIGNIFICANT CHANGE UP (ref 3.5–5.3)
POTASSIUM SERPL-SCNC: 3.6 MMOL/L — SIGNIFICANT CHANGE UP (ref 3.5–5.3)
PROTHROM AB SERPL-ACNC: 12.8 SEC — SIGNIFICANT CHANGE UP (ref 9.5–13)
PROTHROM AB SERPL-ACNC: 12.8 SEC — SIGNIFICANT CHANGE UP (ref 9.5–13)
RBC # BLD: 2 M/UL — LOW (ref 4.2–5.8)
RBC # BLD: 2 M/UL — LOW (ref 4.2–5.8)
RBC # BLD: 2.12 M/UL — LOW (ref 4.2–5.8)
RBC # BLD: 2.12 M/UL — LOW (ref 4.2–5.8)
RBC # BLD: 2.42 M/UL — LOW (ref 4.2–5.8)
RBC # BLD: 2.42 M/UL — LOW (ref 4.2–5.8)
RBC # FLD: 15.1 % — HIGH (ref 10.3–14.5)
RBC # FLD: 15.9 % — HIGH (ref 10.3–14.5)
RBC # FLD: 15.9 % — HIGH (ref 10.3–14.5)
RH IG SCN BLD-IMP: POSITIVE — SIGNIFICANT CHANGE UP
SODIUM SERPL-SCNC: 135 MMOL/L — SIGNIFICANT CHANGE UP (ref 135–145)
SODIUM SERPL-SCNC: 135 MMOL/L — SIGNIFICANT CHANGE UP (ref 135–145)
SODIUM SERPL-SCNC: 136 MMOL/L — SIGNIFICANT CHANGE UP (ref 135–145)
SODIUM SERPL-SCNC: 136 MMOL/L — SIGNIFICANT CHANGE UP (ref 135–145)
WBC # BLD: 14.4 K/UL — HIGH (ref 3.8–10.5)
WBC # BLD: 14.4 K/UL — HIGH (ref 3.8–10.5)
WBC # BLD: 15.76 K/UL — HIGH (ref 3.8–10.5)
WBC # BLD: 15.76 K/UL — HIGH (ref 3.8–10.5)
WBC # BLD: 17.46 K/UL — HIGH (ref 3.8–10.5)
WBC # BLD: 17.46 K/UL — HIGH (ref 3.8–10.5)
WBC # FLD AUTO: 14.4 K/UL — HIGH (ref 3.8–10.5)
WBC # FLD AUTO: 14.4 K/UL — HIGH (ref 3.8–10.5)
WBC # FLD AUTO: 15.76 K/UL — HIGH (ref 3.8–10.5)
WBC # FLD AUTO: 15.76 K/UL — HIGH (ref 3.8–10.5)
WBC # FLD AUTO: 17.46 K/UL — HIGH (ref 3.8–10.5)
WBC # FLD AUTO: 17.46 K/UL — HIGH (ref 3.8–10.5)

## 2023-10-30 PROCEDURE — 93306 TTE W/DOPPLER COMPLETE: CPT | Mod: 26

## 2023-10-30 PROCEDURE — 99232 SBSQ HOSP IP/OBS MODERATE 35: CPT

## 2023-10-30 PROCEDURE — 71045 X-RAY EXAM CHEST 1 VIEW: CPT | Mod: 26

## 2023-10-30 RX ORDER — INSULIN LISPRO 100/ML
8 VIAL (ML) SUBCUTANEOUS
Refills: 0 | Status: DISCONTINUED | OUTPATIENT
Start: 2023-10-30 | End: 2023-10-31

## 2023-10-30 RX ORDER — OXYBUTYNIN CHLORIDE 5 MG
5 TABLET ORAL THREE TIMES A DAY
Refills: 0 | Status: DISCONTINUED | OUTPATIENT
Start: 2023-10-30 | End: 2023-10-31

## 2023-10-30 RX ORDER — INSULIN LISPRO 100/ML
VIAL (ML) SUBCUTANEOUS
Refills: 0 | Status: DISCONTINUED | OUTPATIENT
Start: 2023-10-30 | End: 2023-10-31

## 2023-10-30 RX ORDER — POTASSIUM CHLORIDE 20 MEQ
40 PACKET (EA) ORAL ONCE
Refills: 0 | Status: COMPLETED | OUTPATIENT
Start: 2023-10-30 | End: 2023-10-30

## 2023-10-30 RX ORDER — TAMSULOSIN HYDROCHLORIDE 0.4 MG/1
0.4 CAPSULE ORAL AT BEDTIME
Refills: 0 | Status: DISCONTINUED | OUTPATIENT
Start: 2023-10-30 | End: 2023-10-31

## 2023-10-30 RX ORDER — INSULIN LISPRO 100/ML
VIAL (ML) SUBCUTANEOUS
Refills: 0 | Status: DISCONTINUED | OUTPATIENT
Start: 2023-10-30 | End: 2023-10-30

## 2023-10-30 RX ORDER — INSULIN GLARGINE 100 [IU]/ML
36 INJECTION, SOLUTION SUBCUTANEOUS AT BEDTIME
Refills: 0 | Status: DISCONTINUED | OUTPATIENT
Start: 2023-10-30 | End: 2023-10-31

## 2023-10-30 RX ORDER — INSULIN LISPRO 100/ML
VIAL (ML) SUBCUTANEOUS AT BEDTIME
Refills: 0 | Status: DISCONTINUED | OUTPATIENT
Start: 2023-10-30 | End: 2023-10-31

## 2023-10-30 RX ORDER — INSULIN LISPRO 100/ML
VIAL (ML) SUBCUTANEOUS EVERY 6 HOURS
Refills: 0 | Status: DISCONTINUED | OUTPATIENT
Start: 2023-10-30 | End: 2023-10-30

## 2023-10-30 RX ADMIN — TAMSULOSIN HYDROCHLORIDE 0.4 MILLIGRAM(S): 0.4 CAPSULE ORAL at 22:32

## 2023-10-30 RX ADMIN — SODIUM CHLORIDE 100 MILLILITER(S): 9 INJECTION INTRAMUSCULAR; INTRAVENOUS; SUBCUTANEOUS at 23:02

## 2023-10-30 RX ADMIN — PREGABALIN 1000 MICROGRAM(S): 225 CAPSULE ORAL at 13:13

## 2023-10-30 RX ADMIN — Medication 2: at 12:59

## 2023-10-30 RX ADMIN — Medication 3: at 18:42

## 2023-10-30 RX ADMIN — Medication 40 MILLIEQUIVALENT(S): at 13:13

## 2023-10-30 RX ADMIN — Medication 5 MILLIGRAM(S): at 22:42

## 2023-10-30 RX ADMIN — Medication 5 MILLIGRAM(S): at 18:27

## 2023-10-30 RX ADMIN — Medication 81 MILLIGRAM(S): at 13:13

## 2023-10-30 RX ADMIN — Medication 650 MILLIGRAM(S): at 22:44

## 2023-10-30 RX ADMIN — Medication 8 UNIT(S): at 18:42

## 2023-10-30 RX ADMIN — Medication 3: at 22:31

## 2023-10-30 RX ADMIN — PREGABALIN 1000 MICROGRAM(S): 225 CAPSULE ORAL at 11:28

## 2023-10-30 RX ADMIN — IRON SUCROSE 200 MILLIGRAM(S): 20 INJECTION, SOLUTION INTRAVENOUS at 22:55

## 2023-10-30 RX ADMIN — LIDOCAINE 1 APPLICATION(S): 4 CREAM TOPICAL at 05:14

## 2023-10-30 RX ADMIN — SODIUM CHLORIDE 100 MILLILITER(S): 9 INJECTION INTRAMUSCULAR; INTRAVENOUS; SUBCUTANEOUS at 08:13

## 2023-10-30 RX ADMIN — CEFTRIAXONE 100 MILLIGRAM(S): 500 INJECTION, POWDER, FOR SOLUTION INTRAMUSCULAR; INTRAVENOUS at 13:22

## 2023-10-30 RX ADMIN — INSULIN GLARGINE 36 UNIT(S): 100 INJECTION, SOLUTION SUBCUTANEOUS at 22:30

## 2023-10-30 RX ADMIN — Medication 1 MILLIGRAM(S): at 13:13

## 2023-10-30 RX ADMIN — Medication 650 MILLIGRAM(S): at 23:44

## 2023-10-30 RX ADMIN — LOSARTAN POTASSIUM 25 MILLIGRAM(S): 100 TABLET, FILM COATED ORAL at 05:14

## 2023-10-30 RX ADMIN — LIDOCAINE 1 APPLICATION(S): 4 CREAM TOPICAL at 13:22

## 2023-10-30 RX ADMIN — Medication 3: at 08:13

## 2023-10-30 RX ADMIN — LIDOCAINE 1 APPLICATION(S): 4 CREAM TOPICAL at 22:33

## 2023-10-30 NOTE — CHART NOTE - NSCHARTNOTEFT_GEN_A_CORE
Medicine PA Note    Informed by RN that pt's H&H this AM is 6.5/20.6.  Pt seen and assessed at bedside, on CBI as per urology.  Ordered 1 U PRBC transfusion.  Pt consented for blood transfusion with RN as witness. Risks and benefits discussed at length and all questions answered.  Discussed with urology and Dr. Reed.    Jamila Layne, PA-C  [id number]
Tentative plan for OR on Monday for cystoscopy clot evacuation pending medical and cardiac clearances. Will delay if unable to get clearances.     Urology Preop Note    Diagnosis: hematuria  Procedure: cystoscopy clot evacuation possible fulguration and TURBT  Surgeon: Lacey                          7.8    14.57 )-----------( 173      ( 29 Oct 2023 08:33 )             24.1       10-29    139  |  104  |  12  ----------------------------<  325<H>  4.2   |  26  |  1.13    Ca    8.0<L>      29 Oct 2023 08:33    TPro  6.7  /  Alb  3.5  /  TBili  0.5  /  DBili  0.2  /  AST  39  /  ALT  26  /  AlkPhos  100  10-28      PT/INR - ( 28 Oct 2023 01:34 )   PT: 12.2 sec;   INR: 1.11 ratio         PTT - ( 28 Oct 2023 01:34 )  PTT:26.8 sec    Urinalysis Basic - ( 29 Oct 2023 08:33 )    Color: x / Appearance: x / SG: x / pH: x  Gluc: 325 mg/dL / Ketone: x  / Bili: x / Urobili: x   Blood: x / Protein: x / Nitrite: x   Leuk Esterase: x / RBC: x / WBC x   Sq Epi: x / Non Sq Epi: x / Bacteria: x      UCx:    EKG:  CXR:    Orders:  NPO after midnight  IVF  Consent to be obtained  pending medical and cardiac clearance Clearance  Type & Screen
Urology Preop Note    Diagnosis: Gross Hematuria  Procedure: Cystoscopy, Clot Evacuation, Fulguration, Possible TURBT, Possible L Ureteroscopy, Laser Lithotripsy, Stent Placement  Surgeon: Lacey                          6.5    14.40 )-----------( 139      ( 30 Oct 2023 09:34 )             20.6       10-30    135  |  102  |  14  ----------------------------<  222<H>  3.4<L>   |  24  |  1.13    Ca    7.7<L>      30 Oct 2023 09:34        PT/INR - ( 30 Oct 2023 09:34 )   PT: 12.8 sec;   INR: 1.23 ratio         PTT - ( 30 Oct 2023 09:34 )  PTT:25.3 sec    Urinalysis Basic - ( 30 Oct 2023 09:34 )    Color: x / Appearance: x / SG: x / pH: x  Gluc: 222 mg/dL / Ketone: x  / Bili: x / Urobili: x   Blood: x / Protein: x / Nitrite: x   Leuk Esterase: x / RBC: x / WBC x   Sq Epi: x / Non Sq Epi: x / Bacteria: x    UCx: <10K NGUF    Orders:  NPO after midnight  IVF  Consent obtained  Medical Clearance Documented, awaiting cardiac clearance  Type & Screen  Anti-coagulation held

## 2023-10-30 NOTE — PROGRESS NOTE ADULT - SUBJECTIVE AND OBJECTIVE BOX
Patient is a 63y old  Male who presents with a chief complaint of     SUBJECTIVE / OVERNIGHT EVENTS: cbi in place, HGB dropped below 7, confirmed on rpt, getting a transfusion of 1UPRBC, cystoscopy scheduled for am    MEDICATIONS  (STANDING):  aspirin enteric coated 81 milliGRAM(s) Oral daily  cefTRIAXone   IVPB 1000 milliGRAM(s) IV Intermittent every 24 hours  cyanocobalamin 1000 MICROGram(s) Oral daily  dextrose 5%. 1000 milliLiter(s) (50 mL/Hr) IV Continuous <Continuous>  dextrose 5%. 1000 milliLiter(s) (100 mL/Hr) IV Continuous <Continuous>  dextrose 50% Injectable 12.5 Gram(s) IV Push once  dextrose 50% Injectable 25 Gram(s) IV Push once  dextrose 50% Injectable 25 Gram(s) IV Push once  folic acid 1 milliGRAM(s) Oral daily  glucagon  Injectable 1 milliGRAM(s) IntraMuscular once  influenza  Vaccine (HIGH DOSE) 0.7 milliLiter(s) IntraMuscular once  insulin glargine Injectable (LANTUS) 36 Unit(s) SubCutaneous at bedtime  insulin lispro (ADMELOG) corrective regimen sliding scale   SubCutaneous three times a day before meals  insulin lispro (ADMELOG) corrective regimen sliding scale   SubCutaneous at bedtime  insulin lispro Injectable (ADMELOG) 8 Unit(s) SubCutaneous three times a day before meals  iron sucrose Injectable 200 milliGRAM(s) IV Push every 24 hours  lidocaine 2% Gel 1 Application(s) Topical three times a day  losartan 25 milliGRAM(s) Oral daily  oxybutynin 5 milliGRAM(s) Oral three times a day  sodium chloride 0.9%. 1000 milliLiter(s) (100 mL/Hr) IV Continuous <Continuous>    MEDICATIONS  (PRN):  acetaminophen     Tablet .. 650 milliGRAM(s) Oral every 6 hours PRN Temp greater or equal to 38C (100.4F), Mild Pain (1 - 3)  dextrose Oral Gel 15 Gram(s) Oral once PRN Blood Glucose LESS THAN 70 milliGRAM(s)/deciliter  morphine  - Injectable 4 milliGRAM(s) IV Push every 6 hours PRN Severe Pain (7 - 10)      Vital Signs Last 24 Hrs  T(F): 99.1 (10-30-23 @ 13:54), Max: 99.3 (10-30-23 @ 05:12)  HR: 84 (10-30-23 @ 13:54) (75 - 90)  BP: 117/91 (10-30-23 @ 13:54) (109/74 - 138/72)  RR: 18 (10-30-23 @ 13:54) (16 - 18)  SpO2: 95% (10-30-23 @ 13:54) (93% - 96%)  Telemetry:   CAPILLARY BLOOD GLUCOSE      POCT Blood Glucose.: 234 mg/dL (30 Oct 2023 12:54)  POCT Blood Glucose.: 261 mg/dL (30 Oct 2023 07:49)  POCT Blood Glucose.: 308 mg/dL (29 Oct 2023 21:06)    I&O's Summary    29 Oct 2023 07:01  -  30 Oct 2023 07:00  --------------------------------------------------------  IN: 21031 mL / OUT: 90871 mL / NET: 2560 mL    30 Oct 2023 07:01  -  30 Oct 2023 18:01  --------------------------------------------------------  IN: 540 mL / OUT: 0 mL / NET: 540 mL        PHYSICAL EXAM:  GENERAL: NAD, well-developed  HEAD:  Atraumatic, Normocephalic  EYES: EOMI, PERRLA, conjunctiva and sclera clear  NECK: Supple, No JVD  CHEST/LUNG: Clear to auscultation bilaterally; No wheeze  HEART: Regular rate and rhythm; No murmurs, rubs, or gallops  ABDOMEN: Soft, Nontender, Nondistended; Bowel sounds present  EXTREMITIES:  2+ Peripheral Pulses, No clubbing, cyanosis, or edema  PSYCH: AAOx3  NEUROLOGY: non-focal  SKIN: No rashes or lesions    LABS:                        6.5    14.40 )-----------( 139      ( 30 Oct 2023 09:34 )             20.6     10-30    135  |  102  |  14  ----------------------------<  222<H>  3.4<L>   |  24  |  1.13    Ca    7.7<L>      30 Oct 2023 09:34      PT/INR - ( 30 Oct 2023 09:34 )   PT: 12.8 sec;   INR: 1.23 ratio         PTT - ( 30 Oct 2023 09:34 )  PTT:25.3 sec      Urinalysis Basic - ( 30 Oct 2023 09:34 )    Color: x / Appearance: x / SG: x / pH: x  Gluc: 222 mg/dL / Ketone: x  / Bili: x / Urobili: x   Blood: x / Protein: x / Nitrite: x   Leuk Esterase: x / RBC: x / WBC x   Sq Epi: x / Non Sq Epi: x / Bacteria: x        RADIOLOGY & ADDITIONAL TESTS:    Imaging Personally Reviewed:    Consultant(s) Notes Reviewed:      Care Discussed with Consultants/Other Providers:

## 2023-10-30 NOTE — PROGRESS NOTE ADULT - SUBJECTIVE AND OBJECTIVE BOX
Subjective  Patient's O2 Sats dropped to 80s overnight and is now on 2L NC. Otherwise no other acute events overnight. This morning, patient feels well with no acute complaints. Denies any flank pain.     Objective    Vital signs  T(F): , Max: 99.4 (10-29-23 @ 17:07)  HR: 85 (10-30-23 @ 05:12)  BP: 117/79 (10-30-23 @ 05:12)  SpO2: 95% (10-30-23 @ 05:12)  Wt(kg): --    Output     Gen: NAD  Abd: soft, nontender, nondistended  : fisher secured in place. Urine was clear on full blast CBI and was lowered to moderate drip this morning.     Labs      10-29 @ 08:33    WBC 14.57 / Hct 24.1  / SCr 1.13         Culture - Urine (collected 10-28-23 @ 01:51)  Source: Clean Catch Clean Catch (Midstream)  Final Report (10-29-23 @ 08:02):    <10,000 CFU/mL Normal Urogenital Sol

## 2023-10-30 NOTE — PROGRESS NOTE ADULT - ASSESSMENT
63y old Male with no PMHx, and has not seen a PMD recently who presents with painless hematuria for 2 weeks, passing clots, not on AC.    Denies any lightheadedness, dizziness, shortness of breath, abdominal pain. Feels a little bit of "retaining feeling" however has been urinating normally and spontaneously. Denies any fever/chills, abdominal pain, flank pain. No nausea/vomiting. No prostate history. No cancer history however has not had a regular check up with doctors in a while.  He states that he noticed increased difficulty voiding and pain at the tip of the penis with the bleeding.   denies any flank pain, nausea/vomiting, fevers, chills.    In the ER ptn was seen by , CBI placed, ct A/P done, ptn is noted to be hyperglycemic, and anemic w elevated MCV      A/P  Hematuria, w clots on admission, now light pink, improving w CBI,    HGB dropped below 7, confirmed on rpt, getting a transfusion of 1UPRBC,   on prophylactic CTX 2/2 manipulation w CBI   planning for cystoscopy on 10/31:  removing clot burden and fulguration   penile burning, use lidocaine  flomax, oxybutinin  3mm UVJ stone, L  no flank pain, pain free    new onset DM. FS remain elevated but improved, ENDO input appreciated, insulin dosages adjusted. HA1C 11%    anemia, Iron level is low.  will need non emergent GI work up and heme eval once  issues stabilize. Vit B12 levels on the low side, will start daily supplementation. start 1 gm IV Venofer over 5 days, start Folate supplementation.    trend H/H    BP is normal, cont 25 mg Losartan.    medically cleared for cystoscopy    DVT ppx w SCD

## 2023-10-30 NOTE — PROGRESS NOTE ADULT - SUBJECTIVE AND OBJECTIVE BOX
Chief complaint  Patient is a 63y old  Male who presents with a chief complaint of        Labs and Fingersticks  CAPILLARY BLOOD GLUCOSE      POCT Blood Glucose.: 234 mg/dL (30 Oct 2023 12:54)  POCT Blood Glucose.: 261 mg/dL (30 Oct 2023 07:49)  POCT Blood Glucose.: 308 mg/dL (29 Oct 2023 21:06)  POCT Blood Glucose.: 361 mg/dL (29 Oct 2023 17:30)      Anion Gap: 9 (10-30 @ 09:34)  Anion Gap: 10 (10-30 @ 08:27)  Anion Gap: 9 (10-29 @ 08:33)      Calcium: 7.7 *L* (10-30 @ 09:34)  Calcium: 8.0 *L* (10-30 @ 08:27)  Calcium: 8.0 *L* (10-29 @ 08:33)          10-30    135  |  102  |  14  ----------------------------<  222<H>  3.4<L>   |  24  |  1.13    Ca    7.7<L>      30 Oct 2023 09:34                          6.5    14.40 )-----------( 139      ( 30 Oct 2023 09:34 )             20.6     Medications  MEDICATIONS  (STANDING):  aspirin enteric coated 81 milliGRAM(s) Oral daily  cefTRIAXone   IVPB 1000 milliGRAM(s) IV Intermittent every 24 hours  cyanocobalamin 1000 MICROGram(s) Oral daily  dextrose 5%. 1000 milliLiter(s) (50 mL/Hr) IV Continuous <Continuous>  dextrose 5%. 1000 milliLiter(s) (100 mL/Hr) IV Continuous <Continuous>  dextrose 50% Injectable 12.5 Gram(s) IV Push once  dextrose 50% Injectable 25 Gram(s) IV Push once  dextrose 50% Injectable 25 Gram(s) IV Push once  folic acid 1 milliGRAM(s) Oral daily  glucagon  Injectable 1 milliGRAM(s) IntraMuscular once  influenza  Vaccine (HIGH DOSE) 0.7 milliLiter(s) IntraMuscular once  insulin glargine Injectable (LANTUS) 36 Unit(s) SubCutaneous at bedtime  insulin lispro (ADMELOG) corrective regimen sliding scale   SubCutaneous three times a day before meals  insulin lispro (ADMELOG) corrective regimen sliding scale   SubCutaneous at bedtime  insulin lispro Injectable (ADMELOG) 8 Unit(s) SubCutaneous three times a day before meals  iron sucrose Injectable 200 milliGRAM(s) IV Push every 24 hours  lidocaine 2% Gel 1 Application(s) Topical three times a day  losartan 25 milliGRAM(s) Oral daily  sodium chloride 0.9%. 1000 milliLiter(s) (100 mL/Hr) IV Continuous <Continuous>      Physical Exam  General: Patient comfortable in bed   Vital Signs Last 12 Hrs  T(F): 98.7 (10-30-23 @ 13:28), Max: 99.3 (10-30-23 @ 05:12)  HR: 88 (10-30-23 @ 13:28) (75 - 88)  BP: 127/84 (10-30-23 @ 13:28) (109/74 - 127/84)  BP(mean): --  RR: 18 (10-30-23 @ 13:28) (16 - 18)  SpO2: 95% (10-30-23 @ 13:28) (93% - 96%)    CVS: S1S2   Respiratory: No wheezing, no crepitations  GI: Abdomen soft, bowel sounds positive  Musculoskeletal:  moves all extremities  : Voiding        Chief complaint  Patient is a 63y old  Male who presents with a chief complaint of        Labs and Fingersticks  CAPILLARY BLOOD GLUCOSE      POCT Blood Glucose.: 234 mg/dL (30 Oct 2023 12:54)  POCT Blood Glucose.: 261 mg/dL (30 Oct 2023 07:49)  POCT Blood Glucose.: 308 mg/dL (29 Oct 2023 21:06)  POCT Blood Glucose.: 361 mg/dL (29 Oct 2023 17:30)      Anion Gap: 9 (10-30 @ 09:34)  Anion Gap: 10 (10-30 @ 08:27)  Anion Gap: 9 (10-29 @ 08:33)      Calcium: 7.7 *L* (10-30 @ 09:34)  Calcium: 8.0 *L* (10-30 @ 08:27)  Calcium: 8.0 *L* (10-29 @ 08:33)          10-30    135  |  102  |  14  ----------------------------<  222<H>  3.4<L>   |  24  |  1.13    Ca    7.7<L>      30 Oct 2023 09:34                          6.5    14.40 )-----------( 139      ( 30 Oct 2023 09:34 )             20.6     Medications  MEDICATIONS  (STANDING):  aspirin enteric coated 81 milliGRAM(s) Oral daily  cefTRIAXone   IVPB 1000 milliGRAM(s) IV Intermittent every 24 hours  cyanocobalamin 1000 MICROGram(s) Oral daily  dextrose 5%. 1000 milliLiter(s) (50 mL/Hr) IV Continuous <Continuous>  dextrose 5%. 1000 milliLiter(s) (100 mL/Hr) IV Continuous <Continuous>  dextrose 50% Injectable 12.5 Gram(s) IV Push once  dextrose 50% Injectable 25 Gram(s) IV Push once  dextrose 50% Injectable 25 Gram(s) IV Push once  folic acid 1 milliGRAM(s) Oral daily  glucagon  Injectable 1 milliGRAM(s) IntraMuscular once  influenza  Vaccine (HIGH DOSE) 0.7 milliLiter(s) IntraMuscular once  insulin glargine Injectable (LANTUS) 36 Unit(s) SubCutaneous at bedtime  insulin lispro (ADMELOG) corrective regimen sliding scale   SubCutaneous three times a day before meals  insulin lispro (ADMELOG) corrective regimen sliding scale   SubCutaneous at bedtime  insulin lispro Injectable (ADMELOG) 8 Unit(s) SubCutaneous three times a day before meals  iron sucrose Injectable 200 milliGRAM(s) IV Push every 24 hours  lidocaine 2% Gel 1 Application(s) Topical three times a day  losartan 25 milliGRAM(s) Oral daily  sodium chloride 0.9%. 1000 milliLiter(s) (100 mL/Hr) IV Continuous <Continuous>      Physical Exam  General: Patient comfortable in bed   Vital Signs Last 12 Hrs  T(F): 98.7 (10-30-23 @ 13:28), Max: 99.3 (10-30-23 @ 05:12)  HR: 88 (10-30-23 @ 13:28) (75 - 88)  BP: 127/84 (10-30-23 @ 13:28) (109/74 - 127/84)  BP(mean): --  RR: 18 (10-30-23 @ 13:28) (16 - 18)  SpO2: 95% (10-30-23 @ 13:28) (93% - 96%)    CVS: S1S2   Respiratory: No wheezing, no crepitations  GI: Abdomen soft, bowel sounds positive  Musculoskeletal:  moves all extremities  : Voiding

## 2023-10-30 NOTE — PROGRESS NOTE ADULT - ASSESSMENT
63 year old male with limited past medical hx admitted for hematuria, clots; L 3mm UVJ calculus. Patient without flank pain and afebrile, medical expulsive therapy for stone management.     Plan  -continue CBI and wean as tolerated --> now on medium drip   - can continue lidocaine jelly for penile pain  - Continue CTX for 3 days total given manipulation with irrigation  - start flomax  - f/u H&H, transfuse as needed  - trend Cr  - IV fluids  - Keep NPO for now for possible OR Monday vs. Tuesday for cystoscopy clot evacuation   - Medical clearance documented. Awaiting cardiac clearance  -rest of care per medicine

## 2023-10-30 NOTE — PROGRESS NOTE ADULT - PROBLEM SELECTOR PLAN 1
Will increase Lantus to 36 units at bed time.  Will continue Admelog  8 units before each meal in addition to Admelog correction scale coverage.  Will continue monitoring FS, log, and glucose trends, will Follow up.  Patient counseled for compliance with consistent low carb diet and exercise as tolerated outpatient.

## 2023-10-30 NOTE — PROGRESS NOTE ADULT - ASSESSMENT
Assessment  DMT2: 63y Male with newly diagnosed DM T2 with hyperglycemia admitted with hematuria, A1C is 10.2%, blood sugars are running high, eating meals, compliant with low carb diet.  Patient is high risk with high level decision making due to uncontrolled diabetes, has increased risk for complications.   Hematuria: Being worked up,  monitored, asymptomatic.          Discussed plan and management with Dr Angelica Burleson NP - TEAMS  Essence Dominguez MD  Cell: [phone]  Office: [phone]        Assessment  DMT2: 63y Male with newly diagnosed DM T2 with hyperglycemia admitted with hematuria, A1C is 10.2%, blood sugars are running high, eating meals,  compliant with low carb diet.  Patient is high risk with high level decision making due to uncontrolled diabetes, has increased risk for complications.   Hematuria: Being worked up,  monitored, asymptomatic.          Discussed plan and management with Dr Angelica Burleson NP - TEAMS  Essence Dominguez MD  Cell: [phone]  Office: [phone]

## 2023-10-30 NOTE — CONSULT NOTE ADULT - SUBJECTIVE AND OBJECTIVE BOX
AIDAN SANDOVAL  MRN-[id number]    Patient is a 63y old  Male who presents with a chief complaint of     HPI  HPI:  63y old Male with no PMHx, and has not seen a PMD recently who presents with painless hematuria for 2 weeks, passing clots, not on AC.    Denies any lightheadedness, dizziness, shortness of breath, abdominal pain. Feels a little bit of "retaining feeling" however has been urinating normally and spontaneously. Denies any fever/chills, abdominal pain, flank pain. No nausea/vomiting. No prostate history. No cancer history however has not had a regular check up with doctors in a while.  He states that he noticed increased difficulty voiding and pain at the tip of the penis with the bleeding.   denies any flank pain, nausea/vomiting, fevers, chills.    In the ER ptn was seen by , CBI placed, ct A/P done, ptn is noted to be hyperglycemic     (28 Oct 2023 16:21)    PAST MEDICAL & SURGICAL HISTORY:  No pertinent past medical history      No significant past surgical history      Current Meds  MEDICATIONS  (STANDING):  aspirin enteric coated 81 milliGRAM(s) Oral daily  cefTRIAXone   IVPB 1000 milliGRAM(s) IV Intermittent every 24 hours  cyanocobalamin 1000 MICROGram(s) Oral daily  dextrose 5%. 1000 milliLiter(s) (50 mL/Hr) IV Continuous <Continuous>  dextrose 5%. 1000 milliLiter(s) (100 mL/Hr) IV Continuous <Continuous>  dextrose 50% Injectable 12.5 Gram(s) IV Push once  dextrose 50% Injectable 25 Gram(s) IV Push once  dextrose 50% Injectable 25 Gram(s) IV Push once  folic acid 1 milliGRAM(s) Oral daily  glucagon  Injectable 1 milliGRAM(s) IntraMuscular once  influenza  Vaccine (HIGH DOSE) 0.7 milliLiter(s) IntraMuscular once  insulin glargine Injectable (LANTUS) 36 Unit(s) SubCutaneous at bedtime  insulin lispro (ADMELOG) corrective regimen sliding scale   SubCutaneous three times a day before meals  insulin lispro (ADMELOG) corrective regimen sliding scale   SubCutaneous at bedtime  insulin lispro Injectable (ADMELOG) 8 Unit(s) SubCutaneous three times a day before meals  iron sucrose Injectable 200 milliGRAM(s) IV Push every 24 hours  lidocaine 2% Gel 1 Application(s) Topical three times a day  losartan 25 milliGRAM(s) Oral daily  oxybutynin 5 milliGRAM(s) Oral three times a day  sodium chloride 0.9%. 1000 milliLiter(s) (100 mL/Hr) IV Continuous <Continuous>    MEDICATIONS  (PRN):  acetaminophen     Tablet .. 650 milliGRAM(s) Oral every 6 hours PRN Temp greater or equal to 38C (100.4F), Mild Pain (1 - 3)  dextrose Oral Gel 15 Gram(s) Oral once PRN Blood Glucose LESS THAN 70 milliGRAM(s)/deciliter  morphine  - Injectable 4 milliGRAM(s) IV Push every 6 hours PRN Severe Pain (7 - 10)      Allergies  Allergies    No Known Allergies    Intolerances        Social History  , Retired. No tob.  No etoh    Family History  FAMILY HISTORY:      Review of System  REVIEW OF SYSTEMS      General:	Denies fatigue, fevers, chills, sweats, decreased appetite.    Skin/Breast: denies pruritis, rash  	  Ophthalmologic: no change in vision or blurring  	  HEENT	Denies dry mouth, oral sores, dysphagia,  change in hearing.    Respiratory and Thorax:  cough, sob, wheeze, hemoptysis  	  Cardiovascular:	no cp , palp, orthopnea    Gastrointestinal:	no n/v/d constipation    Genitourinary:	no dysuria of frequency, no hematuria, no flank pain    Musculoskeletal:	no bone or joint pain. no muscle aches.     Neurological:	no change in sensory or motor function. no headache. no weakness.     Psychiatric:	no depression, no anxiety, insomnia.     Hematology/Lymphatics:	no bleeding or bruising    Vitals  Vital Signs Last 24 Hrs  T(C): 37.3 (30 Oct 2023 13:54), Max: 37.4 (30 Oct 2023 05:12)  T(F): 99.1 (30 Oct 2023 13:54), Max: 99.3 (30 Oct 2023 05:12)  HR: 84 (30 Oct 2023 13:54) (75 - 90)  BP: 117/91 (30 Oct 2023 13:54) (109/74 - 138/72)  BP(mean): --  RR: 18 (30 Oct 2023 13:54) (16 - 18)  SpO2: 95% (30 Oct 2023 13:54) (93% - 96%)    Parameters below as of 30 Oct 2023 13:54  Patient On (Oxygen Delivery Method): nasal cannula  O2 Flow (L/min): 3    PHYSICAL EXAM:    Constitutional: NAD    Eyes: PERRLA EOMI, anicteric sclera    Heent :No oral sores, no pharyngeal injection. moist mucosa.    Neck: supple, no jvd, no LAD    Respiratory: CTA b/l     Cardiovascular: s1s2, no m/g/r    Gastrointestinal: soft, nt, nd, + BS    Extremities: no c/c/e    Neurological:A&O x 3 moves all ext.    Skin: no rash on exposed skin    Lymph Nodes: no lymphadenopathy.      Lab  CBC Full  -  ( 30 Oct 2023 09:34 )  WBC Count : 14.40 K/uL  RBC Count : 2.00 M/uL  Hemoglobin : 6.5 g/dL  Hematocrit : 20.6 %  Platelet Count - Automated : 139 K/uL  Mean Cell Volume : 103.0 fl  Mean Cell Hemoglobin : 32.5 pg  Mean Cell Hemoglobin Concentration : 31.6 gm/dL  Auto Neutrophil # : x  Auto Lymphocyte # : x  Auto Monocyte # : x  Auto Eosinophil # : x  Auto Basophil # : x  Auto Neutrophil % : x  Auto Lymphocyte % : x  Auto Monocyte % : x  Auto Eosinophil % : x  Auto Basophil % : x    10-30    135  |  102  |  14  ----------------------------<  222<H>  3.4<L>   |  24  |  1.13    Ca    7.7<L>      30 Oct 2023 09:34      PT/INR - ( 30 Oct 2023 09:34 )   PT: 12.8 sec;   INR: 1.23 ratio         PTT - ( 30 Oct 2023 09:34 )  PTT:25.3 sec    Rad:    Assessment/Plan         AIDAN SANDOVAL  MRN-[id number]    Patient is a 63y old  Male who presents with a chief complaint of   HPI:  63y old Male with no PMHx, and has not seen a PMD recently who presents with painless hematuria for 2 weeks, passing clots, not on AC.    Denies any lightheadedness, dizziness, shortness of breath, abdominal pain. Feels a little bit of "retaining feeling" however has been urinating normally and spontaneously. Denies any fever/chills, abdominal pain, flank pain. No nausea/vomiting. No prostate history. No cancer history however has not had a regular check up with doctors in a while.  He states that he noticed increased difficulty voiding and pain at the tip of the penis with the bleeding.   denies any flank pain, nausea/vomiting, fevers, chills.    In the ER ptn was seen by , CBI placed, ct A/P done, ptn is noted to be hyperglycemic     (28 Oct 2023 16:21)    PAST MEDICAL & SURGICAL HISTORY:  No pertinent past medical history      No significant past surgical history      Current Meds  MEDICATIONS  (STANDING):  aspirin enteric coated 81 milliGRAM(s) Oral daily  cefTRIAXone   IVPB 1000 milliGRAM(s) IV Intermittent every 24 hours  cyanocobalamin 1000 MICROGram(s) Oral daily  dextrose 5%. 1000 milliLiter(s) (50 mL/Hr) IV Continuous <Continuous>  dextrose 5%. 1000 milliLiter(s) (100 mL/Hr) IV Continuous <Continuous>  dextrose 50% Injectable 12.5 Gram(s) IV Push once  dextrose 50% Injectable 25 Gram(s) IV Push once  dextrose 50% Injectable 25 Gram(s) IV Push once  folic acid 1 milliGRAM(s) Oral daily  glucagon  Injectable 1 milliGRAM(s) IntraMuscular once  influenza  Vaccine (HIGH DOSE) 0.7 milliLiter(s) IntraMuscular once  insulin glargine Injectable (LANTUS) 36 Unit(s) SubCutaneous at bedtime  insulin lispro (ADMELOG) corrective regimen sliding scale   SubCutaneous three times a day before meals  insulin lispro (ADMELOG) corrective regimen sliding scale   SubCutaneous at bedtime  insulin lispro Injectable (ADMELOG) 8 Unit(s) SubCutaneous three times a day before meals  iron sucrose Injectable 200 milliGRAM(s) IV Push every 24 hours  lidocaine 2% Gel 1 Application(s) Topical three times a day  losartan 25 milliGRAM(s) Oral daily  oxybutynin 5 milliGRAM(s) Oral three times a day  sodium chloride 0.9%. 1000 milliLiter(s) (100 mL/Hr) IV Continuous <Continuous>    MEDICATIONS  (PRN):  acetaminophen     Tablet .. 650 milliGRAM(s) Oral every 6 hours PRN Temp greater or equal to 38C (100.4F), Mild Pain (1 - 3)  dextrose Oral Gel 15 Gram(s) Oral once PRN Blood Glucose LESS THAN 70 milliGRAM(s)/deciliter  morphine  - Injectable 4 milliGRAM(s) IV Push every 6 hours PRN Severe Pain (7 - 10)    Allergies    No Known Allergies    Social History  No tob.  No etoh    FAMILY HISTORY: non-contrib.       REVIEW OF SYSTEMS      General:	Denies fatigue, fevers, chills, sweats, decreased appetite.    Skin/Breast: denies pruritis, rash  	  Ophthalmologic: no change in vision or blurring  	  HEENT	Denies dry mouth, oral sores, dysphagia,  change in hearing.    Respiratory and Thorax:  cough, sob, wheeze, hemoptysis  	  Cardiovascular:	no cp , palp, orthopnea    Gastrointestinal:	no n/v/d constipation    Genitourinary:	as above    Musculoskeletal:	no bone or joint pain. no muscle aches.     Neurological:	no change in sensory or motor function. no headache. no weakness.     Psychiatric:	no depression, no anxiety, insomnia.     Hematology/Lymphatics:	no bleeding or bruising    Vitals  Vital Signs Last 24 Hrs  T(C): 37.3 (30 Oct 2023 13:54), Max: 37.4 (30 Oct 2023 05:12)  T(F): 99.1 (30 Oct 2023 13:54), Max: 99.3 (30 Oct 2023 05:12)  HR: 84 (30 Oct 2023 13:54) (75 - 90)  BP: 117/91 (30 Oct 2023 13:54) (109/74 - 138/72)  BP(mean): --  RR: 18 (30 Oct 2023 13:54) (16 - 18)  SpO2: 95% (30 Oct 2023 13:54) (93% - 96%)    Parameters below as of 30 Oct 2023 13:54  Patient On (Oxygen Delivery Method): nasal cannula  O2 Flow (L/min): 3    PHYSICAL EXAM:    Constitutional: NAD    Eyes: PERRLA EOMI, anicteric sclera    Heent :No oral sores, no pharyngeal injection. moist mucosa.    Neck: supple, no jvd, no LAD    Respiratory: CTA b/l     Cardiovascular: s1s2, no m/g/r    Gastrointestinal: soft, nt, nd, + BS    Extremities: no c/c/e    Neurological:A&O x 3 moves all ext.    Skin: no rash on exposed skin    Lymph Nodes: no lymphadenopathy.      Lab  CBC Full  -  ( 30 Oct 2023 09:34 )  WBC Count : 14.40 K/uL  RBC Count : 2.00 M/uL  Hemoglobin : 6.5 g/dL  Hematocrit : 20.6 %  Platelet Count - Automated : 139 K/uL  Mean Cell Volume : 103.0 fl  Mean Cell Hemoglobin : 32.5 pg  Mean Cell Hemoglobin Concentration : 31.6 gm/dL  Auto Neutrophil # : x  Auto Lymphocyte # : x  Auto Monocyte # : x  Auto Eosinophil # : x  Auto Basophil # : x  Auto Neutrophil % : x  Auto Lymphocyte % : x  Auto Monocyte % : x  Auto Eosinophil % : x  Auto Basophil % : x    10-30    135  |  102  |  14  ----------------------------<  222<H>  3.4<L>   |  24  |  1.13    Ca    7.7<L>      30 Oct 2023 09:34      PT/INR - ( 30 Oct 2023 09:34 )   PT: 12.8 sec;   INR: 1.23 ratio         PTT - ( 30 Oct 2023 09:34 )  PTT:25.3 sec    Rad:    Assessment/Plan

## 2023-10-30 NOTE — CONSULT NOTE ADULT - ASSESSMENT
Macrocytic anemia with mild leukocytosis  and mild thrombocytopenia.  Anemia w/u is unremarkable.  It is certainly possible that anemia is simply related to his bleeding.  Unclear why he is macrocytic . Will check a retic count. b12 folate , tsh, cmp unremarkable  Mild elevation of wbc and decreased plt could be related to consumption/reactive process.    Can't r/o possiblity of MDS.  For now management from heme standpoint is supportive.  Transfuse as needed.  Would plan for outpt f/u and further w/u

## 2023-10-30 NOTE — PROGRESS NOTE ADULT - PROBLEM SELECTOR PLAN 1
CBI and wean as tolerated->weaned to medium fast drip today  urology follow up. Plan for cystoscopy. Acceptable cardiac risk to proceed. METS > 4, RCRI 0   IVF   DVT ppx

## 2023-10-30 NOTE — PROGRESS NOTE ADULT - SUBJECTIVE AND OBJECTIVE BOX
Subjective: Patient seen and examined. No new events except as noted.   plan for cystoscopy today     REVIEW OF SYSTEMS:    CONSTITUTIONAL: +weakness, fevers or chills  EYES/ENT: No visual changes;  No vertigo or throat pain   NECK: No pain or stiffness  RESPIRATORY: No cough, wheezing, hemoptysis; No shortness of breath  CARDIOVASCULAR: No chest pain or palpitations  GASTROINTESTINAL: No abdominal or epigastric pain. No nausea, vomiting, or hematemesis; No diarrhea or constipation. No melena or hematochezia.  GENITOURINARY: No dysuria, frequency or hematuria  NEUROLOGICAL: No numbness or weakness  SKIN: No itching, burning, rashes, or lesions   All other review of systems is negative unless indicated above.    MEDICATIONS:  MEDICATIONS  (STANDING):  aspirin enteric coated 81 milliGRAM(s) Oral daily  cefTRIAXone   IVPB 1000 milliGRAM(s) IV Intermittent every 24 hours  cyanocobalamin 1000 MICROGram(s) Oral daily  cyanocobalamin Injectable 1000 MICROGram(s) SubCutaneous once  dextrose 5%. 1000 milliLiter(s) (50 mL/Hr) IV Continuous <Continuous>  dextrose 5%. 1000 milliLiter(s) (100 mL/Hr) IV Continuous <Continuous>  dextrose 50% Injectable 12.5 Gram(s) IV Push once  dextrose 50% Injectable 25 Gram(s) IV Push once  dextrose 50% Injectable 25 Gram(s) IV Push once  folic acid 1 milliGRAM(s) Oral daily  glucagon  Injectable 1 milliGRAM(s) IntraMuscular once  influenza  Vaccine (HIGH DOSE) 0.7 milliLiter(s) IntraMuscular once  insulin glargine Injectable (LANTUS) 36 Unit(s) SubCutaneous at bedtime  insulin lispro (ADMELOG) corrective regimen sliding scale   SubCutaneous every 6 hours  iron sucrose Injectable 200 milliGRAM(s) IV Push every 24 hours  lidocaine 2% Gel 1 Application(s) Topical three times a day  losartan 25 milliGRAM(s) Oral daily  sodium chloride 0.9%. 1000 milliLiter(s) (100 mL/Hr) IV Continuous <Continuous>      PHYSICAL EXAM:  T(C): 36.7 (10-30-23 @ 07:52), Max: 37.4 (10-29-23 @ 17:07)  HR: 77 (10-30-23 @ 07:52) (77 - 90)  BP: 121/76 (10-30-23 @ 07:52) (117/79 - 138/72)  RR: 18 (10-30-23 @ 07:52) (16 - 18)  SpO2: 95% (10-30-23 @ 07:52) (91% - 95%)  Wt(kg): --  I&O's Summary    29 Oct 2023 07:01  -  30 Oct 2023 07:00  --------------------------------------------------------  IN: 81272 mL / OUT: 30770 mL / NET: 2560 mL          Appearance: Normal	  HEENT:   Normal oral mucosa, PERRL, EOMI	  Lymphatic: No lymphadenopathy , no edema  Cardiovascular: Normal S1 S2, No JVD, No murmurs , Peripheral pulses palpable 2+ bilaterally  Respiratory: Lungs clear to auscultation, normal effort 	  Gastrointestinal:  Soft, Non-tender, + BS	  Skin: No rashes, No ecchymoses, No cyanosis, warm to touch  Musculoskeletal: Normal range of motion, normal strength  Psychiatry:  Mood & affect appropriate  Ext: 1+  edema chronic venous stasis skin discoloration  +CBI     LABS:    CARDIAC MARKERS:                                7.8    14.57 )-----------( 173      ( 29 Oct 2023 08:33 )             24.1     10-29    139  |  104  |  12  ----------------------------<  325<H>  4.2   |  26  |  1.13    Ca    8.0<L>      29 Oct 2023 08:33      proBNP:   Lipid Profile:   HgA1c:   TSH:     15          TELEMETRY: 	    ECG:  	  RADIOLOGY:   DIAGNOSTIC TESTING:  [ ] Echocardiogram:  [ ]  Catheterization:  [ ] Stress Test:    OTHER:

## 2023-10-31 ENCOUNTER — APPOINTMENT (OUTPATIENT)
Dept: UROLOGY | Facility: HOSPITAL | Age: 63
End: 2023-10-31

## 2023-10-31 ENCOUNTER — RESULT REVIEW (OUTPATIENT)
Age: 63
End: 2023-10-31

## 2023-10-31 LAB
ANION GAP SERPL CALC-SCNC: 10 MMOL/L — SIGNIFICANT CHANGE UP (ref 5–17)
ANION GAP SERPL CALC-SCNC: 10 MMOL/L — SIGNIFICANT CHANGE UP (ref 5–17)
APTT BLD: 27.7 SEC — SIGNIFICANT CHANGE UP (ref 24.5–35.6)
APTT BLD: 27.7 SEC — SIGNIFICANT CHANGE UP (ref 24.5–35.6)
BUN SERPL-MCNC: 16 MG/DL — SIGNIFICANT CHANGE UP (ref 7–23)
BUN SERPL-MCNC: 16 MG/DL — SIGNIFICANT CHANGE UP (ref 7–23)
CALCIUM SERPL-MCNC: 7.9 MG/DL — LOW (ref 8.4–10.5)
CALCIUM SERPL-MCNC: 7.9 MG/DL — LOW (ref 8.4–10.5)
CHLORIDE SERPL-SCNC: 104 MMOL/L — SIGNIFICANT CHANGE UP (ref 96–108)
CHLORIDE SERPL-SCNC: 104 MMOL/L — SIGNIFICANT CHANGE UP (ref 96–108)
CO2 SERPL-SCNC: 23 MMOL/L — SIGNIFICANT CHANGE UP (ref 22–31)
CO2 SERPL-SCNC: 23 MMOL/L — SIGNIFICANT CHANGE UP (ref 22–31)
CREAT SERPL-MCNC: 1.19 MG/DL — SIGNIFICANT CHANGE UP (ref 0.5–1.3)
CREAT SERPL-MCNC: 1.19 MG/DL — SIGNIFICANT CHANGE UP (ref 0.5–1.3)
EGFR: 69 ML/MIN/1.73M2 — SIGNIFICANT CHANGE UP
EGFR: 69 ML/MIN/1.73M2 — SIGNIFICANT CHANGE UP
GLUCOSE BLDC GLUCOMTR-MCNC: 218 MG/DL — HIGH (ref 70–99)
GLUCOSE BLDC GLUCOMTR-MCNC: 218 MG/DL — HIGH (ref 70–99)
GLUCOSE BLDC GLUCOMTR-MCNC: 221 MG/DL — HIGH (ref 70–99)
GLUCOSE BLDC GLUCOMTR-MCNC: 221 MG/DL — HIGH (ref 70–99)
GLUCOSE BLDC GLUCOMTR-MCNC: 295 MG/DL — HIGH (ref 70–99)
GLUCOSE BLDC GLUCOMTR-MCNC: 295 MG/DL — HIGH (ref 70–99)
GLUCOSE BLDC GLUCOMTR-MCNC: 314 MG/DL — HIGH (ref 70–99)
GLUCOSE BLDC GLUCOMTR-MCNC: 314 MG/DL — HIGH (ref 70–99)
GLUCOSE BLDC GLUCOMTR-MCNC: 362 MG/DL — HIGH (ref 70–99)
GLUCOSE BLDC GLUCOMTR-MCNC: 362 MG/DL — HIGH (ref 70–99)
GLUCOSE SERPL-MCNC: 214 MG/DL — HIGH (ref 70–99)
GLUCOSE SERPL-MCNC: 214 MG/DL — HIGH (ref 70–99)
HCT VFR BLD CALC: 26.6 % — LOW (ref 39–50)
HCT VFR BLD CALC: 26.6 % — LOW (ref 39–50)
HGB BLD-MCNC: 8.5 G/DL — LOW (ref 13–17)
HGB BLD-MCNC: 8.5 G/DL — LOW (ref 13–17)
INR BLD: 1.25 RATIO — HIGH (ref 0.85–1.18)
INR BLD: 1.25 RATIO — HIGH (ref 0.85–1.18)
LACTATE SERPL-SCNC: 1.2 MMOL/L — SIGNIFICANT CHANGE UP (ref 0.5–2)
LACTATE SERPL-SCNC: 1.2 MMOL/L — SIGNIFICANT CHANGE UP (ref 0.5–2)
MCHC RBC-ENTMCNC: 31.1 PG — SIGNIFICANT CHANGE UP (ref 27–34)
MCHC RBC-ENTMCNC: 31.1 PG — SIGNIFICANT CHANGE UP (ref 27–34)
MCHC RBC-ENTMCNC: 32 GM/DL — SIGNIFICANT CHANGE UP (ref 32–36)
MCHC RBC-ENTMCNC: 32 GM/DL — SIGNIFICANT CHANGE UP (ref 32–36)
MCV RBC AUTO: 97.4 FL — SIGNIFICANT CHANGE UP (ref 80–100)
MCV RBC AUTO: 97.4 FL — SIGNIFICANT CHANGE UP (ref 80–100)
NRBC # BLD: 1 /100 WBCS — HIGH (ref 0–0)
NRBC # BLD: 1 /100 WBCS — HIGH (ref 0–0)
PLATELET # BLD AUTO: 131 K/UL — LOW (ref 150–400)
PLATELET # BLD AUTO: 131 K/UL — LOW (ref 150–400)
POTASSIUM SERPL-MCNC: 3.9 MMOL/L — SIGNIFICANT CHANGE UP (ref 3.5–5.3)
POTASSIUM SERPL-MCNC: 3.9 MMOL/L — SIGNIFICANT CHANGE UP (ref 3.5–5.3)
POTASSIUM SERPL-SCNC: 3.9 MMOL/L — SIGNIFICANT CHANGE UP (ref 3.5–5.3)
POTASSIUM SERPL-SCNC: 3.9 MMOL/L — SIGNIFICANT CHANGE UP (ref 3.5–5.3)
PROTHROM AB SERPL-ACNC: 13 SEC — SIGNIFICANT CHANGE UP (ref 9.5–13)
PROTHROM AB SERPL-ACNC: 13 SEC — SIGNIFICANT CHANGE UP (ref 9.5–13)
RBC # BLD: 2.73 M/UL — LOW (ref 4.2–5.8)
RBC # BLD: 2.73 M/UL — LOW (ref 4.2–5.8)
RBC # FLD: 18.9 % — HIGH (ref 10.3–14.5)
RBC # FLD: 18.9 % — HIGH (ref 10.3–14.5)
SODIUM SERPL-SCNC: 137 MMOL/L — SIGNIFICANT CHANGE UP (ref 135–145)
SODIUM SERPL-SCNC: 137 MMOL/L — SIGNIFICANT CHANGE UP (ref 135–145)
WBC # BLD: 17.3 K/UL — HIGH (ref 3.8–10.5)
WBC # BLD: 17.3 K/UL — HIGH (ref 3.8–10.5)
WBC # FLD AUTO: 17.3 K/UL — HIGH (ref 3.8–10.5)
WBC # FLD AUTO: 17.3 K/UL — HIGH (ref 3.8–10.5)

## 2023-10-31 PROCEDURE — 88307 TISSUE EXAM BY PATHOLOGIST: CPT | Mod: 26

## 2023-10-31 PROCEDURE — 52235 CYSTOSCOPY AND TREATMENT: CPT

## 2023-10-31 PROCEDURE — 88341 IMHCHEM/IMCYTCHM EA ADD ANTB: CPT | Mod: 26

## 2023-10-31 PROCEDURE — 88342 IMHCHEM/IMCYTCHM 1ST ANTB: CPT | Mod: 26

## 2023-10-31 RX ORDER — GLUCAGON INJECTION, SOLUTION 0.5 MG/.1ML
1 INJECTION, SOLUTION SUBCUTANEOUS ONCE
Refills: 0 | Status: DISCONTINUED | OUTPATIENT
Start: 2023-10-31 | End: 2023-11-02

## 2023-10-31 RX ORDER — LOSARTAN POTASSIUM 100 MG/1
25 TABLET, FILM COATED ORAL DAILY
Refills: 0 | Status: DISCONTINUED | OUTPATIENT
Start: 2023-10-31 | End: 2023-11-02

## 2023-10-31 RX ORDER — PIPERACILLIN AND TAZOBACTAM 4; .5 G/20ML; G/20ML
3.38 INJECTION, POWDER, LYOPHILIZED, FOR SOLUTION INTRAVENOUS ONCE
Refills: 0 | Status: DISCONTINUED | OUTPATIENT
Start: 2023-10-31 | End: 2023-10-31

## 2023-10-31 RX ORDER — SODIUM CHLORIDE 9 MG/ML
1000 INJECTION, SOLUTION INTRAVENOUS
Refills: 0 | Status: DISCONTINUED | OUTPATIENT
Start: 2023-10-31 | End: 2023-11-02

## 2023-10-31 RX ORDER — ACETAMINOPHEN 500 MG
650 TABLET ORAL EVERY 6 HOURS
Refills: 0 | Status: DISCONTINUED | OUTPATIENT
Start: 2023-10-31 | End: 2023-11-02

## 2023-10-31 RX ORDER — FOLIC ACID 0.8 MG
1 TABLET ORAL DAILY
Refills: 0 | Status: DISCONTINUED | OUTPATIENT
Start: 2023-10-31 | End: 2023-11-02

## 2023-10-31 RX ORDER — LIDOCAINE 4 G/100G
1 CREAM TOPICAL THREE TIMES A DAY
Refills: 0 | Status: DISCONTINUED | OUTPATIENT
Start: 2023-10-31 | End: 2023-11-02

## 2023-10-31 RX ORDER — PIPERACILLIN AND TAZOBACTAM 4; .5 G/20ML; G/20ML
3.38 INJECTION, POWDER, LYOPHILIZED, FOR SOLUTION INTRAVENOUS ONCE
Refills: 0 | Status: COMPLETED | OUTPATIENT
Start: 2023-10-31 | End: 2023-10-31

## 2023-10-31 RX ORDER — DEXTROSE 50 % IN WATER 50 %
12.5 SYRINGE (ML) INTRAVENOUS ONCE
Refills: 0 | Status: DISCONTINUED | OUTPATIENT
Start: 2023-10-31 | End: 2023-11-02

## 2023-10-31 RX ORDER — INSULIN GLARGINE 100 [IU]/ML
40 INJECTION, SOLUTION SUBCUTANEOUS AT BEDTIME
Refills: 0 | Status: DISCONTINUED | OUTPATIENT
Start: 2023-10-31 | End: 2023-10-31

## 2023-10-31 RX ORDER — SODIUM CHLORIDE 9 MG/ML
1000 INJECTION INTRAMUSCULAR; INTRAVENOUS; SUBCUTANEOUS
Refills: 0 | Status: DISCONTINUED | OUTPATIENT
Start: 2023-10-31 | End: 2023-11-02

## 2023-10-31 RX ORDER — ASPIRIN/CALCIUM CARB/MAGNESIUM 324 MG
81 TABLET ORAL DAILY
Refills: 0 | Status: DISCONTINUED | OUTPATIENT
Start: 2023-10-31 | End: 2023-11-02

## 2023-10-31 RX ORDER — TAMSULOSIN HYDROCHLORIDE 0.4 MG/1
0.4 CAPSULE ORAL AT BEDTIME
Refills: 0 | Status: DISCONTINUED | OUTPATIENT
Start: 2023-10-31 | End: 2023-11-02

## 2023-10-31 RX ORDER — PIPERACILLIN AND TAZOBACTAM 4; .5 G/20ML; G/20ML
3.38 INJECTION, POWDER, LYOPHILIZED, FOR SOLUTION INTRAVENOUS EVERY 8 HOURS
Refills: 0 | Status: DISCONTINUED | OUTPATIENT
Start: 2023-10-31 | End: 2023-10-31

## 2023-10-31 RX ORDER — FENTANYL CITRATE 50 UG/ML
25 INJECTION INTRAVENOUS
Refills: 0 | Status: DISCONTINUED | OUTPATIENT
Start: 2023-10-31 | End: 2023-10-31

## 2023-10-31 RX ORDER — HYDROMORPHONE HYDROCHLORIDE 2 MG/ML
0.5 INJECTION INTRAMUSCULAR; INTRAVENOUS; SUBCUTANEOUS
Refills: 0 | Status: DISCONTINUED | OUTPATIENT
Start: 2023-10-31 | End: 2023-10-31

## 2023-10-31 RX ORDER — DEXTROSE 50 % IN WATER 50 %
15 SYRINGE (ML) INTRAVENOUS ONCE
Refills: 0 | Status: DISCONTINUED | OUTPATIENT
Start: 2023-10-31 | End: 2023-11-02

## 2023-10-31 RX ORDER — DEXTROSE 50 % IN WATER 50 %
25 SYRINGE (ML) INTRAVENOUS ONCE
Refills: 0 | Status: DISCONTINUED | OUTPATIENT
Start: 2023-10-31 | End: 2023-11-02

## 2023-10-31 RX ORDER — PREGABALIN 225 MG/1
1000 CAPSULE ORAL DAILY
Refills: 0 | Status: DISCONTINUED | OUTPATIENT
Start: 2023-10-31 | End: 2023-11-02

## 2023-10-31 RX ORDER — INSULIN GLARGINE 100 [IU]/ML
40 INJECTION, SOLUTION SUBCUTANEOUS AT BEDTIME
Refills: 0 | Status: DISCONTINUED | OUTPATIENT
Start: 2023-10-31 | End: 2023-11-01

## 2023-10-31 RX ORDER — INSULIN LISPRO 100/ML
VIAL (ML) SUBCUTANEOUS EVERY 6 HOURS
Refills: 0 | Status: DISCONTINUED | OUTPATIENT
Start: 2023-10-31 | End: 2023-10-31

## 2023-10-31 RX ORDER — ONDANSETRON 8 MG/1
4 TABLET, FILM COATED ORAL ONCE
Refills: 0 | Status: DISCONTINUED | OUTPATIENT
Start: 2023-10-31 | End: 2023-10-31

## 2023-10-31 RX ORDER — INSULIN LISPRO 100/ML
9 VIAL (ML) SUBCUTANEOUS
Refills: 0 | Status: DISCONTINUED | OUTPATIENT
Start: 2023-10-31 | End: 2023-11-01

## 2023-10-31 RX ORDER — MORPHINE SULFATE 50 MG/1
4 CAPSULE, EXTENDED RELEASE ORAL EVERY 6 HOURS
Refills: 0 | Status: DISCONTINUED | OUTPATIENT
Start: 2023-10-31 | End: 2023-11-02

## 2023-10-31 RX ORDER — INSULIN LISPRO 100/ML
9 VIAL (ML) SUBCUTANEOUS
Refills: 0 | Status: DISCONTINUED | OUTPATIENT
Start: 2023-10-31 | End: 2023-10-31

## 2023-10-31 RX ORDER — INSULIN LISPRO 100/ML
VIAL (ML) SUBCUTANEOUS EVERY 6 HOURS
Refills: 0 | Status: DISCONTINUED | OUTPATIENT
Start: 2023-10-31 | End: 2023-11-01

## 2023-10-31 RX ORDER — OXYBUTYNIN CHLORIDE 5 MG
5 TABLET ORAL THREE TIMES A DAY
Refills: 0 | Status: DISCONTINUED | OUTPATIENT
Start: 2023-10-31 | End: 2023-11-02

## 2023-10-31 RX ADMIN — LIDOCAINE 1 APPLICATION(S): 4 CREAM TOPICAL at 12:20

## 2023-10-31 RX ADMIN — Medication 5 MILLIGRAM(S): at 21:09

## 2023-10-31 RX ADMIN — Medication 4: at 19:05

## 2023-10-31 RX ADMIN — Medication 5 MILLIGRAM(S): at 06:29

## 2023-10-31 RX ADMIN — LIDOCAINE 1 APPLICATION(S): 4 CREAM TOPICAL at 06:30

## 2023-10-31 RX ADMIN — LOSARTAN POTASSIUM 25 MILLIGRAM(S): 100 TABLET, FILM COATED ORAL at 06:29

## 2023-10-31 RX ADMIN — Medication 5 MILLIGRAM(S): at 15:06

## 2023-10-31 RX ADMIN — PREGABALIN 1000 MICROGRAM(S): 225 CAPSULE ORAL at 15:05

## 2023-10-31 RX ADMIN — SODIUM CHLORIDE 100 MILLILITER(S): 9 INJECTION INTRAMUSCULAR; INTRAVENOUS; SUBCUTANEOUS at 21:09

## 2023-10-31 RX ADMIN — PIPERACILLIN AND TAZOBACTAM 25 GRAM(S): 4; .5 INJECTION, POWDER, LYOPHILIZED, FOR SOLUTION INTRAVENOUS at 18:11

## 2023-10-31 RX ADMIN — PIPERACILLIN AND TAZOBACTAM 200 GRAM(S): 4; .5 INJECTION, POWDER, LYOPHILIZED, FOR SOLUTION INTRAVENOUS at 07:44

## 2023-10-31 RX ADMIN — Medication 2: at 06:27

## 2023-10-31 RX ADMIN — Medication 9 UNIT(S): at 15:11

## 2023-10-31 RX ADMIN — INSULIN GLARGINE 40 UNIT(S): 100 INJECTION, SOLUTION SUBCUTANEOUS at 22:31

## 2023-10-31 RX ADMIN — Medication 2: at 11:35

## 2023-10-31 RX ADMIN — Medication 1 MILLIGRAM(S): at 15:05

## 2023-10-31 RX ADMIN — LIDOCAINE 1 APPLICATION(S): 4 CREAM TOPICAL at 21:09

## 2023-10-31 RX ADMIN — Medication 9 UNIT(S): at 19:04

## 2023-10-31 RX ADMIN — Medication 81 MILLIGRAM(S): at 15:05

## 2023-10-31 RX ADMIN — SODIUM CHLORIDE 100 MILLILITER(S): 9 INJECTION INTRAMUSCULAR; INTRAVENOUS; SUBCUTANEOUS at 11:34

## 2023-10-31 RX ADMIN — TAMSULOSIN HYDROCHLORIDE 0.4 MILLIGRAM(S): 0.4 CAPSULE ORAL at 21:09

## 2023-10-31 NOTE — PROGRESS NOTE ADULT - SUBJECTIVE AND OBJECTIVE BOX
Patient is a 63y old  Male who presents with a chief complaint of     SUBJECTIVE / OVERNIGHT EVENTS:     MEDICATIONS  (STANDING):  aspirin enteric coated 81 milliGRAM(s) Oral daily  cyanocobalamin 1000 MICROGram(s) Oral daily  dextrose 5%. 1000 milliLiter(s) (50 mL/Hr) IV Continuous <Continuous>  dextrose 5%. 1000 milliLiter(s) (100 mL/Hr) IV Continuous <Continuous>  dextrose 50% Injectable 25 Gram(s) IV Push once  dextrose 50% Injectable 12.5 Gram(s) IV Push once  dextrose 50% Injectable 25 Gram(s) IV Push once  folic acid 1 milliGRAM(s) Oral daily  glucagon  Injectable 1 milliGRAM(s) IntraMuscular once  influenza  Vaccine (HIGH DOSE) 0.7 milliLiter(s) IntraMuscular once  insulin glargine Injectable (LANTUS) 40 Unit(s) SubCutaneous at bedtime  insulin lispro (ADMELOG) corrective regimen sliding scale   SubCutaneous every 6 hours  insulin lispro Injectable (ADMELOG) 9 Unit(s) SubCutaneous three times a day before meals  lidocaine 2% Gel 1 Application(s) Topical three times a day  losartan 25 milliGRAM(s) Oral daily  oxybutynin 5 milliGRAM(s) Oral three times a day  sodium chloride 0.9%. 1000 milliLiter(s) (100 mL/Hr) IV Continuous <Continuous>  tamsulosin 0.4 milliGRAM(s) Oral at bedtime    MEDICATIONS  (PRN):  acetaminophen     Tablet .. 650 milliGRAM(s) Oral every 6 hours PRN Temp greater or equal to 38C (100.4F), Mild Pain (1 - 3)  dextrose Oral Gel 15 Gram(s) Oral once PRN Blood Glucose LESS THAN 70 milliGRAM(s)/deciliter  morphine  - Injectable 4 milliGRAM(s) IV Push every 6 hours PRN Severe Pain (7 - 10)      Vital Signs Last 24 Hrs  T(F): 97.4 (10-31-23 @ 15:05), Max: 101.5 (10-30-23 @ 22:40)  HR: 70 (10-31-23 @ 15:05) (61 - 89)  BP: 130/74 (10-31-23 @ 15:05) (100/67 - 138/75)  RR: 18 (10-31-23 @ 15:05) (16 - 20)  SpO2: 94% (10-31-23 @ 15:05) (88% - 100%)  Telemetry:   CAPILLARY BLOOD GLUCOSE      POCT Blood Glucose.: 314 mg/dL (31 Oct 2023 18:16)  POCT Blood Glucose.: 295 mg/dL (31 Oct 2023 14:30)  POCT Blood Glucose.: 221 mg/dL (31 Oct 2023 11:08)  POCT Blood Glucose.: 218 mg/dL (31 Oct 2023 06:24)  POCT Blood Glucose.: 260 mg/dL (30 Oct 2023 21:55)  POCT Blood Glucose.: 283 mg/dL (30 Oct 2023 18:29)    I&O's Summary    30 Oct 2023 07:01  -  31 Oct 2023 07:00  --------------------------------------------------------  IN: 3590 mL / OUT: 0 mL / NET: 3590 mL    31 Oct 2023 07:01  -  31 Oct 2023 18:27  --------------------------------------------------------  IN: 600 mL / OUT: 0 mL / NET: 600 mL        PHYSICAL EXAM:  GENERAL: NAD, well-developed  HEAD:  Atraumatic, Normocephalic  EYES: EOMI, PERRLA, conjunctiva and sclera clear  NECK: Supple, No JVD  CHEST/LUNG: Clear to auscultation bilaterally; No wheeze  HEART: Regular rate and rhythm; No murmurs, rubs, or gallops  ABDOMEN: Soft, Nontender, Nondistended; Bowel sounds present  EXTREMITIES:  2+ Peripheral Pulses, No clubbing, cyanosis, or edema  PSYCH: AAOx3  NEUROLOGY: non-focal  SKIN: No rashes or lesions    LABS:                        8.5    17.30 )-----------( 131      ( 31 Oct 2023 06:17 )             26.6     10-31    137  |  104  |  16  ----------------------------<  214<H>  3.9   |  23  |  1.19    Ca    7.9<L>      31 Oct 2023 06:17      PT/INR - ( 31 Oct 2023 06:17 )   PT: 13.0 sec;   INR: 1.25 ratio         PTT - ( 31 Oct 2023 06:17 )  PTT:27.7 sec      Urinalysis Basic - ( 31 Oct 2023 06:17 )    Color: x / Appearance: x / SG: x / pH: x  Gluc: 214 mg/dL / Ketone: x  / Bili: x / Urobili: x   Blood: x / Protein: x / Nitrite: x   Leuk Esterase: x / RBC: x / WBC x   Sq Epi: x / Non Sq Epi: x / Bacteria: x        RADIOLOGY & ADDITIONAL TESTS:    Imaging Personally Reviewed:    Consultant(s) Notes Reviewed:      Care Discussed with Consultants/Other Providers:

## 2023-10-31 NOTE — PROGRESS NOTE ADULT - SUBJECTIVE AND OBJECTIVE BOX
24 Hour Events:  Patient received 2u pRBC and responded appropriately with a Hgb of 8.5 this morning. Also spiked a fever to 101.5 overnight. Blood cultures taken.    Subjective  Patient seen and examined this morning. Resting comfortably. On moderate drip CBI.    Objective    Vital signs  T(F): , Max: 101.5 (10-30-23 @ 22:40)  HR: 69 (10-31-23 @ 05:09)  BP: 108/72 (10-31-23 @ 05:09)  SpO2: 92% (10-31-23 @ 05:09)  Wt(kg): --    Output     Gen: NAD  Abd: soft, nontender, nondistended  : fisher secured in place draining pink tinged urine on moderate drip CBI.     Labs      10-31 @ 06:17    WBC 17.30 / Hct 26.6  / SCr --       10-30 @ 18:11    WBC 17.46 / Hct 24.4  / SCr --           Culture - Urine (collected 10-28-23 @ 01:51)  Source: Clean Catch Clean Catch (Midstream)  Final Report (10-29-23 @ 08:02):    <10,000 CFU/mL Normal Urogenital Sol      Blood Cx: pending

## 2023-10-31 NOTE — PROGRESS NOTE ADULT - SUBJECTIVE AND OBJECTIVE BOX
Chief complaint  Patient is a 63y old  Male who presents with a chief complaint of        Labs and Fingersticks  CAPILLARY BLOOD GLUCOSE      POCT Blood Glucose.: 221 mg/dL (31 Oct 2023 11:08)  POCT Blood Glucose.: 218 mg/dL (31 Oct 2023 06:24)  POCT Blood Glucose.: 260 mg/dL (30 Oct 2023 21:55)  POCT Blood Glucose.: 283 mg/dL (30 Oct 2023 18:29)  POCT Blood Glucose.: 234 mg/dL (30 Oct 2023 12:54)      Anion Gap: 10 (10-31 @ 06:17)  Anion Gap: 9 (10-30 @ 09:34)  Anion Gap: 10 (10-30 @ 08:27)      Calcium: 7.9 *L* (10-31 @ 06:17)  Calcium: 7.7 *L* (10-30 @ 09:34)  Calcium: 8.0 *L* (10-30 @ 08:27)        10-31    137  |  104  |  16  ----------------------------<  214<H>  3.9   |  23  |  1.19    Ca    7.9<L>      31 Oct 2023 06:17                          8.5    17.30 )-----------( 131      ( 31 Oct 2023 06:17 )             26.6     Medications  MEDICATIONS  (STANDING):  aspirin enteric coated 81 milliGRAM(s) Oral daily  cyanocobalamin 1000 MICROGram(s) Oral daily  dextrose 5%. 1000 milliLiter(s) (100 mL/Hr) IV Continuous <Continuous>  dextrose 5%. 1000 milliLiter(s) (50 mL/Hr) IV Continuous <Continuous>  dextrose 50% Injectable 25 Gram(s) IV Push once  dextrose 50% Injectable 12.5 Gram(s) IV Push once  dextrose 50% Injectable 25 Gram(s) IV Push once  folic acid 1 milliGRAM(s) Oral daily  glucagon  Injectable 1 milliGRAM(s) IntraMuscular once  influenza  Vaccine (HIGH DOSE) 0.7 milliLiter(s) IntraMuscular once  insulin glargine Injectable (LANTUS) 40 Unit(s) SubCutaneous at bedtime  insulin lispro (ADMELOG) corrective regimen sliding scale   SubCutaneous every 6 hours  insulin lispro Injectable (ADMELOG) 9 Unit(s) SubCutaneous three times a day before meals  lidocaine 2% Gel 1 Application(s) Topical three times a day  losartan 25 milliGRAM(s) Oral daily  oxybutynin 5 milliGRAM(s) Oral three times a day  piperacillin/tazobactam IVPB.- 3.375 Gram(s) IV Intermittent once  sodium chloride 0.9%. 1000 milliLiter(s) (100 mL/Hr) IV Continuous <Continuous>  tamsulosin 0.4 milliGRAM(s) Oral at bedtime      Physical Exam  General: Patient comfortable in bed   Vital Signs Last 12 Hrs  T(F): 98.8 (10-31-23 @ 11:03), Max: 98.8 (10-31-23 @ 11:03)  HR: 69 (10-31-23 @ 12:15) (61 - 89)  BP: 106/63 (10-31-23 @ 12:15) (100/67 - 125/84)  BP(mean): 80 (10-31-23 @ 12:15) (74 - 97)  RR: 17 (10-31-23 @ 12:15) (16 - 20)  SpO2: 93% (10-31-23 @ 12:15) (88% - 100%)    CVS: S1S2   Respiratory: No wheezing, no crepitations  GI: Abdomen soft, bowel sounds positive  Musculoskeletal:  moves all extremities  : Voiding     Chief complaint  Patient is a 63y old  Male who presents with a chief complaint of        Labs and Fingersticks  CAPILLARY BLOOD GLUCOSE      POCT Blood Glucose.: 221 mg/dL (31 Oct 2023 11:08)  POCT Blood Glucose.: 218 mg/dL (31 Oct 2023 06:24)  POCT Blood Glucose.: 260 mg/dL (30 Oct 2023 21:55)  POCT Blood Glucose.: 283 mg/dL (30 Oct 2023 18:29)  POCT Blood Glucose.: 234 mg/dL (30 Oct 2023 12:54)      Anion Gap: 10 (10-31 @ 06:17)  Anion Gap: 9 (10-30 @ 09:34)  Anion Gap: 10 (10-30 @ 08:27)      Calcium: 7.9 *L* (10-31 @ 06:17)  Calcium: 7.7 *L* (10-30 @ 09:34)  Calcium: 8.0 *L* (10-30 @ 08:27)        10-31    137  |  104  |  16  ----------------------------<  214<H>  3.9   |  23  |  1.19    Ca    7.9<L>      31 Oct 2023 06:17                          8.5    17.30 )-----------( 131      ( 31 Oct 2023 06:17 )             26.6     Medications  MEDICATIONS  (STANDING):  aspirin enteric coated 81 milliGRAM(s) Oral daily  cyanocobalamin 1000 MICROGram(s) Oral daily  dextrose 5%. 1000 milliLiter(s) (100 mL/Hr) IV Continuous <Continuous>  dextrose 5%. 1000 milliLiter(s) (50 mL/Hr) IV Continuous <Continuous>  dextrose 50% Injectable 25 Gram(s) IV Push once  dextrose 50% Injectable 12.5 Gram(s) IV Push once  dextrose 50% Injectable 25 Gram(s) IV Push once  folic acid 1 milliGRAM(s) Oral daily  glucagon  Injectable 1 milliGRAM(s) IntraMuscular once  influenza  Vaccine (HIGH DOSE) 0.7 milliLiter(s) IntraMuscular once  insulin glargine Injectable (LANTUS) 40 Unit(s) SubCutaneous at bedtime  insulin lispro (ADMELOG) corrective regimen sliding scale   SubCutaneous every 6 hours  insulin lispro Injectable (ADMELOG) 9 Unit(s) SubCutaneous three times a day before meals  lidocaine 2% Gel 1 Application(s) Topical three times a day  losartan 25 milliGRAM(s) Oral daily  oxybutynin 5 milliGRAM(s) Oral three times a day  piperacillin/tazobactam IVPB.- 3.375 Gram(s) IV Intermittent once  sodium chloride 0.9%. 1000 milliLiter(s) (100 mL/Hr) IV Continuous <Continuous>  tamsulosin 0.4 milliGRAM(s) Oral at bedtime      Physical Exam  General: Patient comfortable in bed   Vital Signs Last 12 Hrs  T(F): 98.8 (10-31-23 @ 11:03), Max: 98.8 (10-31-23 @ 11:03)  HR: 69 (10-31-23 @ 12:15) (61 - 89)  BP: 106/63 (10-31-23 @ 12:15) (100/67 - 125/84)  BP(mean): 80 (10-31-23 @ 12:15) (74 - 97)  RR: 17 (10-31-23 @ 12:15) (16 - 20)  SpO2: 93% (10-31-23 @ 12:15) (88% - 100%)    CVS: S1S2   Respiratory: No wheezing, no crepitations  GI: Abdomen soft, bowel sounds positive  Musculoskeletal:  moves all extremities  : Voiding

## 2023-10-31 NOTE — PROGRESS NOTE ADULT - ASSESSMENT
63 year old male with limited past medical hx admitted for hematuria, clots; L 3mm UVJ calculus. Patient without flank pain and afebrile, medical expulsive therapy for stone management.     Plan  - continue CBI and wean as tolerated --> now on medium drip   - can continue lidocaine jelly for penile pain  - Given fever, recommend switching abx to zosyn to broaden coverage  - F/u blood culture  - continue oxybutynin for bladder spasms  - f/u H&H, transfuse as needed  - trend Cr  - IV fluids  - Keep NPO this morning for OR. Scheduled this AM for cystoscopy, clot evac, fulguration, possible TURBT  - Medical clearance and cardiac clearance documented  -rest of care per medicine

## 2023-10-31 NOTE — BRIEF OPERATIVE NOTE - OPERATION/FINDINGS
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 EBL 20cc. 22Fr 3 way fisher catheter replaced. On slow drip CBI.

## 2023-10-31 NOTE — PROGRESS NOTE ADULT - ASSESSMENT
63y old Male with no PMHx, and has not seen a PMD recently who presents with painless hematuria for 2 weeks, passing clots, not on AC.    Denies any lightheadedness, dizziness, shortness of breath, abdominal pain. Feels a little bit of "retaining feeling" however has been urinating normally and spontaneously. Denies any fever/chills, abdominal pain, flank pain. No nausea/vomiting. No prostate history. No cancer history however has not had a regular check up with doctors in a while.  He states that he noticed increased difficulty voiding and pain at the tip of the penis with the bleeding.   denies any flank pain, nausea/vomiting, fevers, chills.    In the ER ptn was seen by , CBI placed, ct A/P done, ptn is noted to be hyperglycemic, and anemic w elevated MCV      A/P  Hematuria, w clots on admission,      s/p cystoscopy with clot evacuation and TURBT    CBI clamped by , monitor urine color off    s/p 2UPRBC 2/2 acute blood loss anemia, HGB 8.5, seen also by heme. iron is low, on 5 day course of  total 1 gm IV Iron. will need outptn work up post DC    f/u pathology results post TURBT    compted prophylactic CTX 2/2 manipulation w CBI    a single temp 101.5 post TURBT, presently afebrile    cont flomax, oxybutinin    3mm UVJ stone, L    no flank pain, pain free    new onset DM. FS remain elevated but improved, ENDO input appreciated, insulin dosages adjusted. HA1C 11%    BP is normal, cont 25 mg Losartan.    DVT ppx w SCD

## 2023-10-31 NOTE — BRIEF OPERATIVE NOTE - NSICDXBRIEFPROCEDURE_GEN_ALL_CORE_FT
PROCEDURES:  Cystourethroscopy with resection of large tumor of bladder 31-Oct-2023 11:09:30  Alvin Blackman

## 2023-10-31 NOTE — PROGRESS NOTE ADULT - SUBJECTIVE AND OBJECTIVE BOX
The patient was seen and examined at bedside.  Denies complaints of chest pain, shortness of breath, nausea, acute pain.  CBI was clamped.    T(C): 36.6 (10-31-23 @ 14:01), Max: 38.6 (10-30-23 @ 22:40)  HR: 74 (10-31-23 @ 14:01) (61 - 89)  BP: 138/75 (10-31-23 @ 14:01) (100/67 - 138/75)  RR: 18 (10-31-23 @ 14:01) (16 - 20)  SpO2: 95% (10-31-23 @ 14:01) (88% - 100%)  Wt(kg): --    Physical Exam:    General: NAD, A+Ox3  Abdomen: soft, non-tender, mild distention      10-30 @ 07:01  -  10-31 @ 07:00  --------------------------------------------------------  IN: 3590 mL / OUT: 0 mL / NET: 3590 mL    10-31 @ 07:01  -  10-31 @ 14:08  --------------------------------------------------------  IN: 600 mL / OUT: 0 mL / NET: 600 mL      CBI - clear

## 2023-10-31 NOTE — PROGRESS NOTE ADULT - ASSESSMENT
63 year old male with hematuria s/p clot evacuation and TURBT    -CBI clamped, monitor urine color off  -trend H/H  -f/u pathology results  -OOB/DVT prophylaxis/incentive spirometry

## 2023-10-31 NOTE — PROGRESS NOTE ADULT - SUBJECTIVE AND OBJECTIVE BOX
Subjective: Patient seen and examined. No new events except as noted.   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 No nausea, vomiting, or hematemesis; No diarrhea or constipation. No melena or hematochezia.  GENITOURINARY: No dysuria, frequency or hematuria  NEUROLOGICAL: No numbness or weakness  SKIN: No itching, burning, rashes, or lesions   All other review of systems is negative unless indicated above.    MEDICATIONS:  MEDICATIONS  (STANDING):  aspirin enteric coated 81 milliGRAM(s) Oral daily  cyanocobalamin 1000 MICROGram(s) Oral daily  dextrose 5%. 1000 milliLiter(s) (50 mL/Hr) IV Continuous <Continuous>  dextrose 5%. 1000 milliLiter(s) (100 mL/Hr) IV Continuous <Continuous>  dextrose 50% Injectable 25 Gram(s) IV Push once  dextrose 50% Injectable 12.5 Gram(s) IV Push once  dextrose 50% Injectable 25 Gram(s) IV Push once  folic acid 1 milliGRAM(s) Oral daily  glucagon  Injectable 1 milliGRAM(s) IntraMuscular once  influenza  Vaccine (HIGH DOSE) 0.7 milliLiter(s) IntraMuscular once  insulin glargine Injectable (LANTUS) 40 Unit(s) SubCutaneous at bedtime  insulin lispro (ADMELOG) corrective regimen sliding scale   SubCutaneous every 6 hours  insulin lispro Injectable (ADMELOG) 9 Unit(s) SubCutaneous three times a day before meals  lidocaine 2% Gel 1 Application(s) Topical three times a day  losartan 25 milliGRAM(s) Oral daily  oxybutynin 5 milliGRAM(s) Oral three times a day  piperacillin/tazobactam IVPB.- 3.375 Gram(s) IV Intermittent once  sodium chloride 0.9%. 1000 milliLiter(s) (100 mL/Hr) IV Continuous <Continuous>  tamsulosin 0.4 milliGRAM(s) Oral at bedtime      PHYSICAL EXAM:  T(C): 37.1 (10-31-23 @ 11:03), Max: 38.6 (10-30-23 @ 22:40)  HR: 62 (10-31-23 @ 11:45) (62 - 89)  BP: 110/63 (10-31-23 @ 11:45) (100/67 - 127/84)  RR: 18 (10-31-23 @ 11:45) (16 - 20)  SpO2: 93% (10-31-23 @ 11:45) (88% - 100%)  Wt(kg): --  I&O's Summary    30 Oct 2023 07:01  -  31 Oct 2023 07:00  --------------------------------------------------------  IN: 3590 mL / OUT: 0 mL / NET: 3590 mL      Height (cm): 170.2 (10-31 @ 07:25)  Weight (kg): 90.7 (10-31 @ 07:25)  BMI (kg/m2): 31.3 (10-31 @ 07:25)  BSA (m2): 2.02 (10-31 @ 07:25)      Appearance: Normal	  HEENT:   Normal oral mucosa, PERRL, EOMI	  Lymphatic: No lymphadenopathy , no edema  Cardiovascular: Normal S1 S2, No JVD, No murmurs , Peripheral pulses palpable 2+ bilaterally  Respiratory: Lungs clear to auscultation, normal effort 	  Gastrointestinal:  Soft, Non-tender, + BS	  Skin: No rashes, No ecchymoses, No cyanosis, warm to touch  Musculoskeletal: Normal range of motion, normal strength  Psychiatry:  Mood & affect appropriate  Ext: 1+  edema chronic venous stasis skin discoloration  +CBI       LABS:    CARDIAC MARKERS:                                8.5    17.30 )-----------( 131      ( 31 Oct 2023 06:17 )             26.6     10-31    137  |  104  |  16  ----------------------------<  214<H>  3.9   |  23  |  1.19    Ca    7.9<L>      31 Oct 2023 06:17      proBNP:   Lipid Profile:   HgA1c:   TSH:             TELEMETRY: 	    ECG:  	  RADIOLOGY:   DIAGNOSTIC TESTING:  [ ] Echocardiogram:  [ ]  Catheterization:  [ ] Stress Test:    OTHER:

## 2023-10-31 NOTE — PROGRESS NOTE ADULT - PROBLEM SELECTOR PLAN 1
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

## 2023-10-31 NOTE — PRE-ANESTHESIA EVALUATION ADULT - NSANTHPEFT_GEN_ALL_CORE
Gen: NAD  Cards: RRR  Pulm: breathing comfortably on RA Gen: NAD  Cards: RRR  Pulm: speaking in full sentences, on 3L NC

## 2023-10-31 NOTE — PROGRESS NOTE ADULT - ASSESSMENT
Assessment  DMT2: 63y Male with newly diagnosed DM T2 with hyperglycemia admitted with hematuria, A1C is 10.2%, blood sugars are running high, eating meals, compliant with low carb diet.  Patient is high risk with high level decision making due to uncontrolled diabetes, has increased risk for complications.   Hematuria: Being worked up,  monitored, asymptomatic. s/p Cystocopy, clot removal, CBL      Discussed plan and management with Dr Angelica Burleson NP - TEAMS  Essence Dominguez MD  Cell: [phone]  Office: [phone]        Assessment  DMT2: 63y Male with newly diagnosed DM T2 with hyperglycemia admitted with hematuria, A1C is 10.2%, blood sugars are running high,  eating meals, compliant with low carb diet.  Patient is high risk with high level decision making due to uncontrolled diabetes, has increased risk for complications.   Hematuria: Being worked up,  monitored, asymptomatic. s/p Cystocopy, clot removal, CBL      Discussed plan and management with Dr Angelica Burleson NP - TEAMS  Essence Dominguez MD  Cell: [phone]  Office: [phone]

## 2023-10-31 NOTE — PRE-ANESTHESIA EVALUATION ADULT - NSRADCARDRESULTSFT_GEN_ALL_CORE
< from: TTE W or WO Ultrasound Enhancing Agent (10.30.23 @ 16:45) >    CONCLUSIONS:      1. Please note that this patient was supine during the studywhich contributed to this poor quality study. Despite UEA/ contrast echo, it was difficult to determine the presence of subtle wall motion abnormalities.   2. Left ventricular endocardium is not well visualized; however, the left ventricular systolicfunction appears grossly normal.   3. There are no regional wall motion abnormalities seen.   4. Normal left ventricular diastolic function, with normal filling pressure.   5. Normal right ventricular cavity size, wall thickness, and systolic function.   6. Pulmonary artery systolic pressure could not be estimated.   7. No pericardial effusion seen.   8. No prior echocardiogram is available for comparison.    < end of copied text >

## 2023-10-31 NOTE — PROGRESS NOTE ADULT - PROBLEM SELECTOR PLAN 1
Will increase Lantus to 40 units at bed time.  Will increase  Admelog to 9  units before each meal in addition to Admelog correction scale coverage.  Will continue monitoring FS, log, and glucose trends, will Follow up.  Patient counseled for compliance with consistent low carb diet and exercise as tolerated outpatient.

## 2023-10-31 NOTE — PRE-ANESTHESIA EVALUATION ADULT - NSANTHPMHFT_GEN_ALL_CORE
s/p 2 units pRBC with appropriate increase in Hgb to 8.5.  Pt also spiked a fever to 101.5  Cardiac clearance note reviewed, TTE reviewed s/p 2 units pRBC with appropriate increase in Hgb to 8.5, currently on 3L NC, but not on supplemental O2 prior to hospitalization  Pt also spiked a fever to 101.5  Cardiac clearance note reviewed, TTE reviewed  pt states that he is able to walk several blocks and up 2 flights of stairs prior to hospitalization without issues, denies having anesthesia/surgery in the past, currently denies CP, SOB

## 2023-11-01 LAB
ANION GAP SERPL CALC-SCNC: 10 MMOL/L — SIGNIFICANT CHANGE UP (ref 5–17)
ANION GAP SERPL CALC-SCNC: 10 MMOL/L — SIGNIFICANT CHANGE UP (ref 5–17)
BUN SERPL-MCNC: 17 MG/DL — SIGNIFICANT CHANGE UP (ref 7–23)
BUN SERPL-MCNC: 17 MG/DL — SIGNIFICANT CHANGE UP (ref 7–23)
CALCIUM SERPL-MCNC: 7.8 MG/DL — LOW (ref 8.4–10.5)
CALCIUM SERPL-MCNC: 7.8 MG/DL — LOW (ref 8.4–10.5)
CHLORIDE SERPL-SCNC: 104 MMOL/L — SIGNIFICANT CHANGE UP (ref 96–108)
CHLORIDE SERPL-SCNC: 104 MMOL/L — SIGNIFICANT CHANGE UP (ref 96–108)
CO2 SERPL-SCNC: 24 MMOL/L — SIGNIFICANT CHANGE UP (ref 22–31)
CO2 SERPL-SCNC: 24 MMOL/L — SIGNIFICANT CHANGE UP (ref 22–31)
CREAT SERPL-MCNC: 1.16 MG/DL — SIGNIFICANT CHANGE UP (ref 0.5–1.3)
CREAT SERPL-MCNC: 1.16 MG/DL — SIGNIFICANT CHANGE UP (ref 0.5–1.3)
EGFR: 71 ML/MIN/1.73M2 — SIGNIFICANT CHANGE UP
EGFR: 71 ML/MIN/1.73M2 — SIGNIFICANT CHANGE UP
GLUCOSE BLDC GLUCOMTR-MCNC: 186 MG/DL — HIGH (ref 70–99)
GLUCOSE BLDC GLUCOMTR-MCNC: 186 MG/DL — HIGH (ref 70–99)
GLUCOSE BLDC GLUCOMTR-MCNC: 190 MG/DL — HIGH (ref 70–99)
GLUCOSE BLDC GLUCOMTR-MCNC: 190 MG/DL — HIGH (ref 70–99)
GLUCOSE BLDC GLUCOMTR-MCNC: 209 MG/DL — HIGH (ref 70–99)
GLUCOSE BLDC GLUCOMTR-MCNC: 209 MG/DL — HIGH (ref 70–99)
GLUCOSE BLDC GLUCOMTR-MCNC: 241 MG/DL — HIGH (ref 70–99)
GLUCOSE BLDC GLUCOMTR-MCNC: 241 MG/DL — HIGH (ref 70–99)
GLUCOSE SERPL-MCNC: 230 MG/DL — HIGH (ref 70–99)
GLUCOSE SERPL-MCNC: 230 MG/DL — HIGH (ref 70–99)
HCT VFR BLD CALC: 27.3 % — LOW (ref 39–50)
HCT VFR BLD CALC: 27.3 % — LOW (ref 39–50)
HGB BLD-MCNC: 8.4 G/DL — LOW (ref 13–17)
HGB BLD-MCNC: 8.4 G/DL — LOW (ref 13–17)
MCHC RBC-ENTMCNC: 30.8 GM/DL — LOW (ref 32–36)
MCHC RBC-ENTMCNC: 30.8 GM/DL — LOW (ref 32–36)
MCHC RBC-ENTMCNC: 31.7 PG — SIGNIFICANT CHANGE UP (ref 27–34)
MCHC RBC-ENTMCNC: 31.7 PG — SIGNIFICANT CHANGE UP (ref 27–34)
MCV RBC AUTO: 103 FL — HIGH (ref 80–100)
MCV RBC AUTO: 103 FL — HIGH (ref 80–100)
NRBC # BLD: 0 /100 WBCS — SIGNIFICANT CHANGE UP (ref 0–0)
NRBC # BLD: 0 /100 WBCS — SIGNIFICANT CHANGE UP (ref 0–0)
PLATELET # BLD AUTO: 159 K/UL — SIGNIFICANT CHANGE UP (ref 150–400)
PLATELET # BLD AUTO: 159 K/UL — SIGNIFICANT CHANGE UP (ref 150–400)
POTASSIUM SERPL-MCNC: 3.8 MMOL/L — SIGNIFICANT CHANGE UP (ref 3.5–5.3)
POTASSIUM SERPL-MCNC: 3.8 MMOL/L — SIGNIFICANT CHANGE UP (ref 3.5–5.3)
POTASSIUM SERPL-SCNC: 3.8 MMOL/L — SIGNIFICANT CHANGE UP (ref 3.5–5.3)
POTASSIUM SERPL-SCNC: 3.8 MMOL/L — SIGNIFICANT CHANGE UP (ref 3.5–5.3)
RBC # BLD: 2.65 M/UL — LOW (ref 4.2–5.8)
RBC # BLD: 2.65 M/UL — LOW (ref 4.2–5.8)
RBC # BLD: 2.68 M/UL — LOW (ref 4.2–5.8)
RBC # BLD: 2.68 M/UL — LOW (ref 4.2–5.8)
RBC # FLD: 20.4 % — HIGH (ref 10.3–14.5)
RBC # FLD: 20.4 % — HIGH (ref 10.3–14.5)
RETICS #: 241.2 K/UL — HIGH (ref 25–125)
RETICS #: 241.2 K/UL — HIGH (ref 25–125)
RETICS/RBC NFR: 9 % — HIGH (ref 0.5–2.5)
RETICS/RBC NFR: 9 % — HIGH (ref 0.5–2.5)
SODIUM SERPL-SCNC: 138 MMOL/L — SIGNIFICANT CHANGE UP (ref 135–145)
SODIUM SERPL-SCNC: 138 MMOL/L — SIGNIFICANT CHANGE UP (ref 135–145)
WBC # BLD: 16.78 K/UL — HIGH (ref 3.8–10.5)
WBC # BLD: 16.78 K/UL — HIGH (ref 3.8–10.5)
WBC # FLD AUTO: 16.78 K/UL — HIGH (ref 3.8–10.5)
WBC # FLD AUTO: 16.78 K/UL — HIGH (ref 3.8–10.5)

## 2023-11-01 PROCEDURE — 99231 SBSQ HOSP IP/OBS SF/LOW 25: CPT | Mod: GC

## 2023-11-01 RX ORDER — INSULIN LISPRO 100/ML
VIAL (ML) SUBCUTANEOUS
Refills: 0 | Status: DISCONTINUED | OUTPATIENT
Start: 2023-11-01 | End: 2023-11-02

## 2023-11-01 RX ORDER — INSULIN LISPRO 100/ML
11 VIAL (ML) SUBCUTANEOUS
Refills: 0 | Status: DISCONTINUED | OUTPATIENT
Start: 2023-11-01 | End: 2023-11-02

## 2023-11-01 RX ORDER — INSULIN GLARGINE 100 [IU]/ML
44 INJECTION, SOLUTION SUBCUTANEOUS AT BEDTIME
Refills: 0 | Status: DISCONTINUED | OUTPATIENT
Start: 2023-11-01 | End: 2023-11-02

## 2023-11-01 RX ORDER — OXYCODONE HYDROCHLORIDE 5 MG/1
2.5 TABLET ORAL EVERY 6 HOURS
Refills: 0 | Status: DISCONTINUED | OUTPATIENT
Start: 2023-11-01 | End: 2023-11-02

## 2023-11-01 RX ADMIN — LIDOCAINE 1 APPLICATION(S): 4 CREAM TOPICAL at 06:19

## 2023-11-01 RX ADMIN — Medication 2: at 09:34

## 2023-11-01 RX ADMIN — TAMSULOSIN HYDROCHLORIDE 0.4 MILLIGRAM(S): 0.4 CAPSULE ORAL at 22:24

## 2023-11-01 RX ADMIN — LIDOCAINE 1 APPLICATION(S): 4 CREAM TOPICAL at 22:24

## 2023-11-01 RX ADMIN — INSULIN GLARGINE 44 UNIT(S): 100 INJECTION, SOLUTION SUBCUTANEOUS at 22:31

## 2023-11-01 RX ADMIN — LIDOCAINE 1 APPLICATION(S): 4 CREAM TOPICAL at 13:35

## 2023-11-01 RX ADMIN — Medication 9 UNIT(S): at 09:34

## 2023-11-01 RX ADMIN — Medication 5 MILLIGRAM(S): at 13:36

## 2023-11-01 RX ADMIN — Medication 1 MILLIGRAM(S): at 12:17

## 2023-11-01 RX ADMIN — OXYCODONE HYDROCHLORIDE 2.5 MILLIGRAM(S): 5 TABLET ORAL at 23:30

## 2023-11-01 RX ADMIN — Medication 2: at 22:32

## 2023-11-01 RX ADMIN — Medication 81 MILLIGRAM(S): at 12:16

## 2023-11-01 RX ADMIN — Medication 11 UNIT(S): at 13:35

## 2023-11-01 RX ADMIN — Medication 1: at 13:35

## 2023-11-01 RX ADMIN — Medication 5 MILLIGRAM(S): at 22:24

## 2023-11-01 RX ADMIN — Medication 5 MILLIGRAM(S): at 06:19

## 2023-11-01 RX ADMIN — OXYCODONE HYDROCHLORIDE 2.5 MILLIGRAM(S): 5 TABLET ORAL at 22:30

## 2023-11-01 RX ADMIN — LOSARTAN POTASSIUM 25 MILLIGRAM(S): 100 TABLET, FILM COATED ORAL at 06:19

## 2023-11-01 RX ADMIN — PREGABALIN 1000 MICROGRAM(S): 225 CAPSULE ORAL at 12:17

## 2023-11-01 NOTE — PROGRESS NOTE ADULT - ASSESSMENT
63y old Male with no PMHx, and has not seen a PMD recently who presents with painless hematuria for 2 weeks, passing clots, not on AC.    Denies any lightheadedness, dizziness, shortness of breath, abdominal pain. Feels a little bit of "retaining feeling" however has been urinating normally and spontaneously. Denies any fever/chills, abdominal pain, flank pain. No nausea/vomiting. No prostate history. No cancer history however has not had a regular check up with doctors in a while.  He states that he noticed increased difficulty voiding and pain at the tip of the penis with the bleeding.   denies any flank pain, nausea/vomiting, fevers, chills.    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 Path pending.  plan for TOV 11/2     ifsher in place, CBI clamped, has pink urine, no clots seen, c/o penile burning/pain, wants analgesics. oxy IR 2.5 q6H prn prescribed      s/p 2UPRBC 2/2 acute blood loss anemia pre -op, HGB 8.4, seen also by heme. iron is low, on 5 day course of  total 1 gm IV Iron. will need outptn work up post DC      compled prophylactic CTX 2/2 manipulation w CBI    a single temp 101.5 post TURBT, presently afebrile    cont flomax, oxybutinin    3mm UVJ stone, L    no flank pain, pain free    new onset DM. FS remain elevated but improved, ENDO input appreciated, insulin dosages adjusted. HA1C 11%    BP is normal, cont 25 mg Losartan.    DVT ppx w SCD

## 2023-11-01 NOTE — PROGRESS NOTE ADULT - SUBJECTIVE AND OBJECTIVE BOX
Subjective: Patient seen and examined. No new events except as noted.   Resting comfortably on slow drip CBI. Complains of some bladder spasms overnight   REVIEW OF SYSTEMS:    CONSTITUTIONAL: No weakness, fevers or chills  EYES/ENT: No visual changes;  No vertigo or throat pain   NECK: No pain or stiffness  RESPIRATORY: No cough, wheezing, hemoptysis; No shortness of breath  CARDIOVASCULAR: No chest pain or palpitations  GASTROINTESTINAL: No abdominal or epigastric pain. No nausea, vomiting, or hematemesis; No diarrhea or constipation. No melena or hematochezia.  GENITOURINARY: No dysuria, frequency or hematuria  NEUROLOGICAL: No numbness or weakness  SKIN: No itching, burning, rashes, or lesions   All other review of systems is negative unless indicated above.    MEDICATIONS:  MEDICATIONS  (STANDING):  aspirin enteric coated 81 milliGRAM(s) Oral daily  cyanocobalamin 1000 MICROGram(s) Oral daily  dextrose 5%. 1000 milliLiter(s) (100 mL/Hr) IV Continuous <Continuous>  dextrose 5%. 1000 milliLiter(s) (50 mL/Hr) IV Continuous <Continuous>  dextrose 50% Injectable 25 Gram(s) IV Push once  dextrose 50% Injectable 12.5 Gram(s) IV Push once  dextrose 50% Injectable 25 Gram(s) IV Push once  folic acid 1 milliGRAM(s) Oral daily  glucagon  Injectable 1 milliGRAM(s) IntraMuscular once  influenza  Vaccine (HIGH DOSE) 0.7 milliLiter(s) IntraMuscular once  insulin glargine Injectable (LANTUS) 40 Unit(s) SubCutaneous at bedtime  insulin lispro (ADMELOG) corrective regimen sliding scale   SubCutaneous Before meals and at bedtime  insulin lispro Injectable (ADMELOG) 9 Unit(s) SubCutaneous three times a day before meals  lidocaine 2% Gel 1 Application(s) Topical three times a day  losartan 25 milliGRAM(s) Oral daily  oxybutynin 5 milliGRAM(s) Oral three times a day  sodium chloride 0.9%. 1000 milliLiter(s) (100 mL/Hr) IV Continuous <Continuous>  tamsulosin 0.4 milliGRAM(s) Oral at bedtime      PHYSICAL EXAM:  T(C): 36.5 (11-01-23 @ 09:19), Max: 37.1 (10-31-23 @ 11:03)  HR: 70 (11-01-23 @ 09:19) (61 - 89)  BP: 117/73 (11-01-23 @ 09:19) (103/61 - 138/75)  RR: 18 (11-01-23 @ 09:19) (16 - 20)  SpO2: 95% (11-01-23 @ 09:19) (92% - 100%)  Wt(kg): --  I&O's Summary    31 Oct 2023 07:01  -  01 Nov 2023 07:00  --------------------------------------------------------  IN: 2280 mL / OUT: 0 mL / NET: 2280 mL          Appearance: Normal	  HEENT:   Normal oral mucosa, PERRL, EOMI	  Lymphatic: No lymphadenopathy , no edema  Cardiovascular: Normal S1 S2, No JVD, No murmurs , Peripheral pulses palpable 2+ bilaterally  Respiratory: Lungs clear to auscultation, normal effort 	  Gastrointestinal:  Soft, Non-tender, + BS	  Skin: No rashes, No ecchymoses, No cyanosis, warm to touch  Musculoskeletal: Normal range of motion, normal strength  Psychiatry:  Mood & affect appropriate  Ext: chronic venous stasis skin discoloration  +CBI       LABS:    CARDIAC MARKERS:                                8.4    16.78 )-----------( 159      ( 01 Nov 2023 07:12 )             27.3     11-01    138  |  104  |  17  ----------------------------<  230<H>  3.8   |  24  |  1.16    Ca    7.8<L>      01 Nov 2023 07:12      proBNP:   Lipid Profile:   HgA1c:   TSH:             TELEMETRY: 	    ECG:  	  RADIOLOGY:   DIAGNOSTIC TESTING:  [ ] Echocardiogram:  [ ]  Catheterization:  [ ] Stress Test:    OTHER:

## 2023-11-01 NOTE — PROGRESS NOTE ADULT - SUBJECTIVE AND OBJECTIVE BOX
AIDAN SANDOVAL  MRN-[id number]    Patient is a 63y old  Male who presents with a chief complaint of     Review of System  REVIEW OF SYSTEMS      General:	Denies fatigue, fevers, chills, sweats, decreased appetite.    Skin/Breast: denies pruritis, rash  	  Ophthalmologic: no change in vision or blurring  	  HEENT	Denies dry mouth, oral sores, dysphagia,  change in hearing.    Respiratory and Thorax:  cough, sob, wheeze, hemoptysis  	  Cardiovascular:	no cp , palp, orthopnea    Gastrointestinal:	no n/v/d constipation    Genitourinary:	no dysuria of frequency, no hematuria, no flank pain    Musculoskeletal:	no bone or joint pain. no muscle aches.     Neurological:	no change in sensory or motor function. no headache. no weakness.     Psychiatric:	no depression, no anxiety, insomnia.     Hematology/Lymphatics:	no bleeding or bruising        Current Meds  MEDICATIONS  (STANDING):  aspirin enteric coated 81 milliGRAM(s) Oral daily  cyanocobalamin 1000 MICROGram(s) Oral daily  dextrose 5%. 1000 milliLiter(s) (100 mL/Hr) IV Continuous <Continuous>  dextrose 5%. 1000 milliLiter(s) (50 mL/Hr) IV Continuous <Continuous>  dextrose 50% Injectable 25 Gram(s) IV Push once  dextrose 50% Injectable 12.5 Gram(s) IV Push once  dextrose 50% Injectable 25 Gram(s) IV Push once  folic acid 1 milliGRAM(s) Oral daily  glucagon  Injectable 1 milliGRAM(s) IntraMuscular once  influenza  Vaccine (HIGH DOSE) 0.7 milliLiter(s) IntraMuscular once  insulin glargine Injectable (LANTUS) 40 Unit(s) SubCutaneous at bedtime  insulin lispro (ADMELOG) corrective regimen sliding scale   SubCutaneous Before meals and at bedtime  insulin lispro Injectable (ADMELOG) 9 Unit(s) SubCutaneous three times a day before meals  lidocaine 2% Gel 1 Application(s) Topical three times a day  losartan 25 milliGRAM(s) Oral daily  oxybutynin 5 milliGRAM(s) Oral three times a day  sodium chloride 0.9%. 1000 milliLiter(s) (100 mL/Hr) IV Continuous <Continuous>  tamsulosin 0.4 milliGRAM(s) Oral at bedtime    MEDICATIONS  (PRN):  acetaminophen     Tablet .. 650 milliGRAM(s) Oral every 6 hours PRN Temp greater or equal to 38C (100.4F), Mild Pain (1 - 3)  dextrose Oral Gel 15 Gram(s) Oral once PRN Blood Glucose LESS THAN 70 milliGRAM(s)/deciliter  morphine  - Injectable 4 milliGRAM(s) IV Push every 6 hours PRN Severe Pain (7 - 10)      Vitals  Vital Signs Last 24 Hrs  T(C): 36.3 (01 Nov 2023 01:14), Max: 37.1 (31 Oct 2023 11:03)  T(F): 97.4 (01 Nov 2023 01:14), Max: 98.8 (31 Oct 2023 11:03)  HR: 61 (01 Nov 2023 01:14) (61 - 89)  BP: 108/71 (01 Nov 2023 01:14) (103/61 - 138/75)  BP(mean): 90 (31 Oct 2023 13:00) (74 - 97)  RR: 18 (01 Nov 2023 01:14) (16 - 20)  SpO2: 97% (01 Nov 2023 01:14) (92% - 100%)    Parameters below as of 01 Nov 2023 01:14  Patient On (Oxygen Delivery Method): nasal cannula  O2 Flow (L/min): 3      Physical Exam  PHYSICAL EXAM:      Constitutional: NAD    Eyes: PERRLA EOMI, anicteric sclera    Heent :No oral sores, no pharyngeal injection. moist mucosa.    Neck: supple, no jvd, no LAD    Respiratory: CTA b/l     Cardiovascular: s1s2, no m/g/r    Gastrointestinal: soft, nt, nd, + BS    Extremities: no c/c/e    Neurological:A&O x 3 moves all ext.    Skin: no rash on exposed skin    Lymph Nodes: no lymphadenopathy.              Lab  CBC Full  -  ( 31 Oct 2023 06:17 )  WBC Count : 17.30 K/uL  RBC Count : 2.73 M/uL  Hemoglobin : 8.5 g/dL  Hematocrit : 26.6 %  Platelet Count - Automated : 131 K/uL  Mean Cell Volume : 97.4 fl  Mean Cell Hemoglobin : 31.1 pg  Mean Cell Hemoglobin Concentration : 32.0 gm/dL  Auto Neutrophil # : x  Auto Lymphocyte # : x  Auto Monocyte # : x  Auto Eosinophil # : x  Auto Basophil # : x  Auto Neutrophil % : x  Auto Lymphocyte % : x  Auto Monocyte % : x  Auto Eosinophil % : x  Auto Basophil % : x    10-31    137  |  104  |  16  ----------------------------<  214<H>  3.9   |  23  |  1.19    Ca    7.9<L>      31 Oct 2023 06:17      PT/INR - ( 31 Oct 2023 06:17 )   PT: 13.0 sec;   INR: 1.25 ratio         PTT - ( 31 Oct 2023 06:17 )  PTT:27.7 sec    Rad:    Assessment/Plan   AIDAN SANDOVAL  MRN-[id number]    Patient is a 63y old  Male who presents with a chief complaint of     Review of System    No new events overnight, as per rn  Pt resting comfortably  Urine clearing    Current Meds  MEDICATIONS  (STANDING):  aspirin enteric coated 81 milliGRAM(s) Oral daily  cyanocobalamin 1000 MICROGram(s) Oral daily  dextrose 5%. 1000 milliLiter(s) (100 mL/Hr) IV Continuous <Continuous>  dextrose 5%. 1000 milliLiter(s) (50 mL/Hr) IV Continuous <Continuous>  dextrose 50% Injectable 25 Gram(s) IV Push once  dextrose 50% Injectable 12.5 Gram(s) IV Push once  dextrose 50% Injectable 25 Gram(s) IV Push once  folic acid 1 milliGRAM(s) Oral daily  glucagon  Injectable 1 milliGRAM(s) IntraMuscular once  influenza  Vaccine (HIGH DOSE) 0.7 milliLiter(s) IntraMuscular once  insulin glargine Injectable (LANTUS) 40 Unit(s) SubCutaneous at bedtime  insulin lispro (ADMELOG) corrective regimen sliding scale   SubCutaneous Before meals and at bedtime  insulin lispro Injectable (ADMELOG) 9 Unit(s) SubCutaneous three times a day before meals  lidocaine 2% Gel 1 Application(s) Topical three times a day  losartan 25 milliGRAM(s) Oral daily  oxybutynin 5 milliGRAM(s) Oral three times a day  sodium chloride 0.9%. 1000 milliLiter(s) (100 mL/Hr) IV Continuous <Continuous>  tamsulosin 0.4 milliGRAM(s) Oral at bedtime    MEDICATIONS  (PRN):  acetaminophen     Tablet .. 650 milliGRAM(s) Oral every 6 hours PRN Temp greater or equal to 38C (100.4F), Mild Pain (1 - 3)  dextrose Oral Gel 15 Gram(s) Oral once PRN Blood Glucose LESS THAN 70 milliGRAM(s)/deciliter  morphine  - Injectable 4 milliGRAM(s) IV Push every 6 hours PRN Severe Pain (7 - 10)      Vitals  Vital Signs Last 24 Hrs  T(C): 36.3 (01 Nov 2023 01:14), Max: 37.1 (31 Oct 2023 11:03)  T(F): 97.4 (01 Nov 2023 01:14), Max: 98.8 (31 Oct 2023 11:03)  HR: 61 (01 Nov 2023 01:14) (61 - 89)  BP: 108/71 (01 Nov 2023 01:14) (103/61 - 138/75)  BP(mean): 90 (31 Oct 2023 13:00) (74 - 97)  RR: 18 (01 Nov 2023 01:14) (16 - 20)  SpO2: 97% (01 Nov 2023 01:14) (92% - 100%)    Parameters below as of 01 Nov 2023 01:14  Patient On (Oxygen Delivery Method): nasal cannula  O2 Flow (L/min): 3    PHYSICAL EXAM:     NAD    Lab  CBC Full  -  ( 31 Oct 2023 06:17 )  WBC Count : 17.30 K/uL  RBC Count : 2.73 M/uL  Hemoglobin : 8.5 g/dL  Hematocrit : 26.6 %  Platelet Count - Automated : 131 K/uL  Mean Cell Volume : 97.4 fl  Mean Cell Hemoglobin : 31.1 pg  Mean Cell Hemoglobin Concentration : 32.0 gm/dL  Auto Neutrophil # : x  Auto Lymphocyte # : x  Auto Monocyte # : x  Auto Eosinophil # : x  Auto Basophil # : x  Auto Neutrophil % : x  Auto Lymphocyte % : x  Auto Monocyte % : x  Auto Eosinophil % : x  Auto Basophil % : x    10-31    137  |  104  |  16  ----------------------------<  214<H>  3.9   |  23  |  1.19    Ca    7.9<L>      31 Oct 2023 06:17      PT/INR - ( 31 Oct 2023 06:17 )   PT: 13.0 sec;   INR: 1.25 ratio         PTT - ( 31 Oct 2023 06:17 )  PTT:27.7 sec    Rad:    Assessment/Plan

## 2023-11-01 NOTE — PROGRESS NOTE ADULT - PROBLEM SELECTOR PLAN 1
s/p Cystoscopy, Clot Evacuation, Fulguration and Resection of large left lateral wall bladder tumor  urology follow up   Heme/Onc eval  DVT ppx

## 2023-11-01 NOTE — PROGRESS NOTE ADULT - SUBJECTIVE AND OBJECTIVE BOX
Chief complaint  Patient is a 63y old  Male who presents with a chief complaint of        Labs and Fingersticks  CAPILLARY BLOOD GLUCOSE      POCT Blood Glucose.: 186 mg/dL (01 Nov 2023 13:29)  POCT Blood Glucose.: 209 mg/dL (01 Nov 2023 09:15)  POCT Blood Glucose.: 362 mg/dL (31 Oct 2023 21:55)  POCT Blood Glucose.: 314 mg/dL (31 Oct 2023 18:16)      Anion Gap: 10 (11-01 @ 07:12)  Anion Gap: 10 (10-31 @ 06:17)      Calcium: 7.8 *L* (11-01 @ 07:12)  Calcium: 7.9 *L* (10-31 @ 06:17)          11-01    138  |  104  |  17  ----------------------------<  230<H>  3.8   |  24  |  1.16    Ca    7.8<L>      01 Nov 2023 07:12                          8.4    16.78 )-----------( 159      ( 01 Nov 2023 07:12 )             27.3     Medications  MEDICATIONS  (STANDING):  aspirin enteric coated 81 milliGRAM(s) Oral daily  cyanocobalamin 1000 MICROGram(s) Oral daily  dextrose 5%. 1000 milliLiter(s) (50 mL/Hr) IV Continuous <Continuous>  dextrose 5%. 1000 milliLiter(s) (100 mL/Hr) IV Continuous <Continuous>  dextrose 50% Injectable 25 Gram(s) IV Push once  dextrose 50% Injectable 12.5 Gram(s) IV Push once  dextrose 50% Injectable 25 Gram(s) IV Push once  folic acid 1 milliGRAM(s) Oral daily  glucagon  Injectable 1 milliGRAM(s) IntraMuscular once  influenza  Vaccine (HIGH DOSE) 0.7 milliLiter(s) IntraMuscular once  insulin glargine Injectable (LANTUS) 44 Unit(s) SubCutaneous at bedtime  insulin lispro (ADMELOG) corrective regimen sliding scale   SubCutaneous Before meals and at bedtime  insulin lispro Injectable (ADMELOG) 11 Unit(s) SubCutaneous three times a day before meals  lidocaine 2% Gel 1 Application(s) Topical three times a day  losartan 25 milliGRAM(s) Oral daily  oxybutynin 5 milliGRAM(s) Oral three times a day  sodium chloride 0.9%. 1000 milliLiter(s) (100 mL/Hr) IV Continuous <Continuous>  tamsulosin 0.4 milliGRAM(s) Oral at bedtime      Physical Exam  General: Patient comfortable in bed  Vital Signs Last 12 Hrs  T(F): 98.5 (11-01-23 @ 13:56), Max: 98.5 (11-01-23 @ 13:56)  HR: 74 (11-01-23 @ 13:56) (63 - 74)  BP: 110/67 (11-01-23 @ 13:56) (110/67 - 117/73)  BP(mean): --  RR: 18 (11-01-23 @ 13:56) (18 - 18)  SpO2: 93% (11-01-23 @ 13:56) (93% - 95%)    CVS: S1S2   Respiratory: No wheezing, no crepitations  GI: Abdomen soft, bowel sounds positive  Musculoskeletal:  moves all extremities  : Voiding        Chief complaint  Patient is a 63y old  Male who presents with a chief complaint of        Labs and Fingersticks  CAPILLARY BLOOD GLUCOSE      POCT Blood Glucose.: 186 mg/dL (01 Nov 2023 13:29)  POCT Blood Glucose.: 209 mg/dL (01 Nov 2023 09:15)  POCT Blood Glucose.: 362 mg/dL (31 Oct 2023 21:55)  POCT Blood Glucose.: 314 mg/dL (31 Oct 2023 18:16)      Anion Gap: 10 (11-01 @ 07:12)  Anion Gap: 10 (10-31 @ 06:17)      Calcium: 7.8 *L* (11-01 @ 07:12)  Calcium: 7.9 *L* (10-31 @ 06:17)          11-01    138  |  104  |  17  ----------------------------<  230<H>  3.8   |  24  |  1.16    Ca    7.8<L>      01 Nov 2023 07:12                          8.4    16.78 )-----------( 159      ( 01 Nov 2023 07:12 )             27.3     Medications  MEDICATIONS  (STANDING):  aspirin enteric coated 81 milliGRAM(s) Oral daily  cyanocobalamin 1000 MICROGram(s) Oral daily  dextrose 5%. 1000 milliLiter(s) (50 mL/Hr) IV Continuous <Continuous>  dextrose 5%. 1000 milliLiter(s) (100 mL/Hr) IV Continuous <Continuous>  dextrose 50% Injectable 25 Gram(s) IV Push once  dextrose 50% Injectable 12.5 Gram(s) IV Push once  dextrose 50% Injectable 25 Gram(s) IV Push once  folic acid 1 milliGRAM(s) Oral daily  glucagon  Injectable 1 milliGRAM(s) IntraMuscular once  influenza  Vaccine (HIGH DOSE) 0.7 milliLiter(s) IntraMuscular once  insulin glargine Injectable (LANTUS) 44 Unit(s) SubCutaneous at bedtime  insulin lispro (ADMELOG) corrective regimen sliding scale   SubCutaneous Before meals and at bedtime  insulin lispro Injectable (ADMELOG) 11 Unit(s) SubCutaneous three times a day before meals  lidocaine 2% Gel 1 Application(s) Topical three times a day  losartan 25 milliGRAM(s) Oral daily  oxybutynin 5 milliGRAM(s) Oral three times a day  sodium chloride 0.9%. 1000 milliLiter(s) (100 mL/Hr) IV Continuous <Continuous>  tamsulosin 0.4 milliGRAM(s) Oral at bedtime      Physical Exam  General: Patient comfortable in bed  Vital Signs Last 12 Hrs  T(F): 98.5 (11-01-23 @ 13:56), Max: 98.5 (11-01-23 @ 13:56)  HR: 74 (11-01-23 @ 13:56) (63 - 74)  BP: 110/67 (11-01-23 @ 13:56) (110/67 - 117/73)  BP(mean): --  RR: 18 (11-01-23 @ 13:56) (18 - 18)  SpO2: 93% (11-01-23 @ 13:56) (93% - 95%)    CVS: S1S2   Respiratory: No wheezing, no crepitations  GI: Abdomen soft, bowel sounds positive  Musculoskeletal:  moves all extremities  : Voiding

## 2023-11-01 NOTE — PROGRESS NOTE ADULT - ASSESSMENT
Assessment  DMT2: 63y Male with newly diagnosed DM T2 with hyperglycemia admitted with hematuria, A1C is 10.2%, blood sugars are running high,  eating meals, compliant with low carb diet.  Patient is high risk with high level decision making due to uncontrolled diabetes, has increased risk for complications.   Hematuria: Being worked up,  monitored, asymptomatic. s/p Cystocopy, clot removal, CBL      Discussed plan and management with Dr Angelica Burleson NP - TEAMS  Essence Dominguez MD  Cell: [phone]  Office: [phone]        Assessment  DMT2: 63y Male with newly diagnosed DM T2 with hyperglycemia admitted with hematuria, A1C is 10.2%, blood sugars are running high, eating meals, compliant with low carb diet.  Patient is high risk with high level decision making due to uncontrolled diabetes, has increased risk for complications.   Hematuria: Being worked up,  monitored, asymptomatic. s/p Cystocopy, clot removal, CBL      Discussed plan and management with Dr Angelica Burleson NP - TEAMS  Essence Dominguez MD  Cell: [phone]  Office: [phone]

## 2023-11-01 NOTE — PROGRESS NOTE ADULT - ASSESSMENT
Macrocytic anemia with mild leukocytosis  and mild thrombocytopenia.  Anemia w/u is unremarkable.  It is certainly possible that anemia is simply related to his bleeding.  Unclear why he is macrocytic . Will check a retic count.   b12 folate , tsh, cmp unremarkable  Mild elevation of wbc and decreased plt could be related to consumption/reactive process.    Can't r/o possibility of MDS.  For now management from heme standpoint is supportive.  Transfuse as needed.  Would plan for outpt f/u and further w/u    Hematuria. s/p Cysto/Turbt.  follow up on results.  mgmt as per .

## 2023-11-01 NOTE — PROGRESS NOTE ADULT - NS ATTEND AMEND GEN_ALL_CORE FT
Chart, labs, vitals, radiology reviewed. Above H&P reviewed and edited where appropriate. Agree with history and physical exam. Agree with assessment and plan. I reviewed the overnight course of events and discussed the care with the patient/ family.  All the decisions in assessment and plan are made by me.
Chart, labs, vitals, radiology reviewed. Above H&P reviewed and edited where appropriate. Agree with history and physical exam. Agree with assessment and plan. I reviewed the overnight course of events and discussed the care with the patient/ family.  All the decisions in assessment and plan are made by me.
Chart, labs, vitals, radiology reviewed. Above H&P reviewed and edited where appropriate. Agree with history and physical exam. Agree with assessment and plan. I reviewed the overnight course of events and discussed the care with the patient/ family.  All the decisions in assessment and plan are made by me..

## 2023-11-01 NOTE — PROGRESS NOTE ADULT - ASSESSMENT
63 year old male with limited past medical hx admitted for hematuria, clots; L 3mm UVJ calculus. Patient without flank pain and afebrile, medical expulsive therapy for stone management. Due to persistent hematuria, underwent cysto, clot evacuation on 10/31 where a large left lateral wall bladder tumor was seen and resected.    Plan  - F/u pathology results.  - continue CBI and wean as tolerated   - can continue lidocaine jelly for penile pain  - abx per primary  - F/u blood culture - NGTD  - f/u H&H, transfuse as needed (f/u AM labs)  - trend Cr  - IV fluids  -rest of care per medicine 63 year old male with limited past medical hx admitted for hematuria, clots; L 3mm UVJ calculus. Patient without flank pain and afebrile, medical expulsive therapy for stone management. Due to persistent hematuria, underwent cysto, clot evacuation on 10/31 where a large left lateral wall bladder tumor was seen and resected.    Plan  - F/u pathology results.  - CBI clamped this morning, plan for TOV tomorrow if urine color stays clear  - can continue lidocaine jelly for penile pain  - abx per primary  - F/u blood culture - NGTD  - f/u H&H, transfuse as needed (f/u AM labs)  - trend Cr  - IV fluids  -rest of care per medicine  - discussed with Dr. Rahman

## 2023-11-01 NOTE — PROGRESS NOTE ADULT - SUBJECTIVE AND OBJECTIVE BOX
Subjective  Patient seen and examined this morning. Resting comfortably on slow drip CBI.    Objective    Vital signs  T(F): , Max: 98.8 (10-31-23 @ 11:03)  HR: 63 (11-01-23 @ 05:49)  BP: 110/72 (11-01-23 @ 05:49)  SpO2: 94% (11-01-23 @ 05:49)  Wt(kg): --    Output       Gen: NAD  Abd: soft, nontender, nondistended  : fisher secured in place draining pink tinged urine on slow drip CBI.     Labs      10-31 @ 06:17    WBC 17.30 / Hct 26.6  / SCr 1.19     10-30 @ 18:11    WBC 17.46 / Hct 24.4  / SCr --           Culture - Blood (collected 10-31-23 @ 00:15)  Source: .Blood Blood-Peripheral  Preliminary Report (11-01-23 @ 04:01):    No growth at 24 hours    Culture - Blood (collected 10-31-23 @ 00:15)  Source: .Blood Blood-Peripheral  Preliminary Report (11-01-23 @ 04:01):    No growth at 24 hours    Culture - Urine (collected 10-28-23 @ 01:51)  Source: Clean Catch Clean Catch (Midstream)  Final Report (10-29-23 @ 08:02):    <10,000 CFU/mL Normal Urogenital Sol       Subjective  Patient seen and examined this morning. Resting comfortably on slow drip CBI. Complaints of some bladder spasms overnight but those were controlled this morning.    Objective    Vital signs  T(F): , Max: 98.8 (10-31-23 @ 11:03)  HR: 63 (11-01-23 @ 05:49)  BP: 110/72 (11-01-23 @ 05:49)  SpO2: 94% (11-01-23 @ 05:49)  Wt(kg): --    Output       Gen: NAD  Abd: soft, nontender, nondistended  : fisher secured in place draining clear urine on slow drip CBI. Clamped this AM.    Labs      10-31 @ 06:17    WBC 17.30 / Hct 26.6  / SCr 1.19     10-30 @ 18:11    WBC 17.46 / Hct 24.4  / SCr --           Culture - Blood (collected 10-31-23 @ 00:15)  Source: .Blood Blood-Peripheral  Preliminary Report (11-01-23 @ 04:01):    No growth at 24 hours    Culture - Blood (collected 10-31-23 @ 00:15)  Source: .Blood Blood-Peripheral  Preliminary Report (11-01-23 @ 04:01):    No growth at 24 hours    Culture - Urine (collected 10-28-23 @ 01:51)  Source: Clean Catch Clean Catch (Midstream)  Final Report (10-29-23 @ 08:02):    <10,000 CFU/mL Normal Urogenital Sol

## 2023-11-01 NOTE — PROGRESS NOTE ADULT - SUBJECTIVE AND OBJECTIVE BOX
Patient is a 63y old  Male who presents with a chief complaint of     SUBJECTIVE / OVERNIGHT EVENTS: fisher in place, CBI clamped, has pink urine, no clots seen, c/o penile burning/pain, wants analgesics. 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 Path pending.  plan for TOV 11/2    MEDICATIONS  (STANDING):  aspirin enteric coated 81 milliGRAM(s) Oral daily  cyanocobalamin 1000 MICROGram(s) Oral daily  dextrose 5%. 1000 milliLiter(s) (50 mL/Hr) IV Continuous <Continuous>  dextrose 5%. 1000 milliLiter(s) (100 mL/Hr) IV Continuous <Continuous>  dextrose 50% Injectable 25 Gram(s) IV Push once  dextrose 50% Injectable 12.5 Gram(s) IV Push once  dextrose 50% Injectable 25 Gram(s) IV Push once  folic acid 1 milliGRAM(s) Oral daily  glucagon  Injectable 1 milliGRAM(s) IntraMuscular once  influenza  Vaccine (HIGH DOSE) 0.7 milliLiter(s) IntraMuscular once  insulin glargine Injectable (LANTUS) 44 Unit(s) SubCutaneous at bedtime  insulin lispro (ADMELOG) corrective regimen sliding scale   SubCutaneous Before meals and at bedtime  insulin lispro Injectable (ADMELOG) 11 Unit(s) SubCutaneous three times a day before meals  lidocaine 2% Gel 1 Application(s) Topical three times a day  losartan 25 milliGRAM(s) Oral daily  oxybutynin 5 milliGRAM(s) Oral three times a day  sodium chloride 0.9%. 1000 milliLiter(s) (100 mL/Hr) IV Continuous <Continuous>  tamsulosin 0.4 milliGRAM(s) Oral at bedtime    MEDICATIONS  (PRN):  acetaminophen     Tablet .. 650 milliGRAM(s) Oral every 6 hours PRN Temp greater or equal to 38C (100.4F), Mild Pain (1 - 3)  dextrose Oral Gel 15 Gram(s) Oral once PRN Blood Glucose LESS THAN 70 milliGRAM(s)/deciliter  morphine  - Injectable 4 milliGRAM(s) IV Push every 6 hours PRN Severe Pain (7 - 10)  oxyCODONE    IR 2.5 milliGRAM(s) Oral every 6 hours PRN penile pain      Vital Signs Last 24 Hrs  T(F): 98.5 (11-01-23 @ 13:56), Max: 98.5 (11-01-23 @ 13:56)  HR: 74 (11-01-23 @ 13:56) (61 - 74)  BP: 110/67 (11-01-23 @ 13:56) (108/71 - 118/70)  RR: 18 (11-01-23 @ 13:56) (18 - 18)  SpO2: 93% (11-01-23 @ 13:56) (93% - 97%)  Telemetry:   CAPILLARY BLOOD GLUCOSE      POCT Blood Glucose.: 186 mg/dL (01 Nov 2023 13:29)  POCT Blood Glucose.: 209 mg/dL (01 Nov 2023 09:15)  POCT Blood Glucose.: 362 mg/dL (31 Oct 2023 21:55)  POCT Blood Glucose.: 314 mg/dL (31 Oct 2023 18:16)    I&O's Summary    31 Oct 2023 07:01  -  01 Nov 2023 07:00  --------------------------------------------------------  IN: 2280 mL / OUT: 0 mL / NET: 2280 mL    01 Nov 2023 07:01  -  01 Nov 2023 15:37  --------------------------------------------------------  IN: 360 mL / OUT: 700 mL / NET: -340 mL        PHYSICAL EXAM:  GENERAL: NAD, well-developed  HEAD:  Atraumatic, Normocephalic  EYES: EOMI, PERRLA, conjunctiva and sclera clear  NECK: Supple, No JVD  CHEST/LUNG: Clear to auscultation bilaterally; No wheeze  HEART: Regular rate and rhythm; No murmurs, rubs, or gallops  ABDOMEN: Soft, Nontender, Nondistended; Bowel sounds present  EXTREMITIES:  2+ Peripheral Pulses, No clubbing, cyanosis, or edema  PSYCH: AAOx3  NEUROLOGY: non-focal  SKIN: No rashes or lesions    LABS:                        8.4    16.78 )-----------( 159      ( 01 Nov 2023 07:12 )             27.3     11-01    138  |  104  |  17  ----------------------------<  230<H>  3.8   |  24  |  1.16    Ca    7.8<L>      01 Nov 2023 07:12      PT/INR - ( 31 Oct 2023 06:17 )   PT: 13.0 sec;   INR: 1.25 ratio         PTT - ( 31 Oct 2023 06:17 )  PTT:27.7 sec      Urinalysis Basic - ( 01 Nov 2023 07:12 )    Color: x / Appearance: x / SG: x / pH: x  Gluc: 230 mg/dL / Ketone: x  / Bili: x / Urobili: x   Blood: x / Protein: x / Nitrite: x   Leuk Esterase: x / RBC: x / WBC x   Sq Epi: x / Non Sq Epi: x / Bacteria: x        RADIOLOGY & ADDITIONAL TESTS:    Imaging Personally Reviewed:    Consultant(s) Notes Reviewed:      Care Discussed with Consultants/Other Providers:

## 2023-11-01 NOTE — PROGRESS NOTE ADULT - PROBLEM SELECTOR PLAN 1
Will increase Lantus to 44 units at bed time.  Will increase  Admelog to 11  units before each meal in addition to Admelog correction scale coverage.  Will continue monitoring FS, log, and glucose trends, will Follow up.  Patient counseled for compliance with consistent low carb diet and exercise as tolerated outpatient.

## 2023-11-02 ENCOUNTER — TRANSCRIPTION ENCOUNTER (OUTPATIENT)
Age: 63
End: 2023-11-02

## 2023-11-02 VITALS — OXYGEN SATURATION: 91 % | TEMPERATURE: 99 F | HEART RATE: 72 BPM | RESPIRATION RATE: 18 BRPM

## 2023-11-02 LAB
ANION GAP SERPL CALC-SCNC: 11 MMOL/L — SIGNIFICANT CHANGE UP (ref 5–17)
ANION GAP SERPL CALC-SCNC: 11 MMOL/L — SIGNIFICANT CHANGE UP (ref 5–17)
BUN SERPL-MCNC: 16 MG/DL — SIGNIFICANT CHANGE UP (ref 7–23)
BUN SERPL-MCNC: 16 MG/DL — SIGNIFICANT CHANGE UP (ref 7–23)
CALCIUM SERPL-MCNC: 7.5 MG/DL — LOW (ref 8.4–10.5)
CALCIUM SERPL-MCNC: 7.5 MG/DL — LOW (ref 8.4–10.5)
CHLORIDE SERPL-SCNC: 105 MMOL/L — SIGNIFICANT CHANGE UP (ref 96–108)
CHLORIDE SERPL-SCNC: 105 MMOL/L — SIGNIFICANT CHANGE UP (ref 96–108)
CO2 SERPL-SCNC: 24 MMOL/L — SIGNIFICANT CHANGE UP (ref 22–31)
CO2 SERPL-SCNC: 24 MMOL/L — SIGNIFICANT CHANGE UP (ref 22–31)
CREAT SERPL-MCNC: 1.2 MG/DL — SIGNIFICANT CHANGE UP (ref 0.5–1.3)
CREAT SERPL-MCNC: 1.2 MG/DL — SIGNIFICANT CHANGE UP (ref 0.5–1.3)
EGFR: 68 ML/MIN/1.73M2 — SIGNIFICANT CHANGE UP
EGFR: 68 ML/MIN/1.73M2 — SIGNIFICANT CHANGE UP
GLUCOSE BLDC GLUCOMTR-MCNC: 136 MG/DL — HIGH (ref 70–99)
GLUCOSE BLDC GLUCOMTR-MCNC: 136 MG/DL — HIGH (ref 70–99)
GLUCOSE BLDC GLUCOMTR-MCNC: 141 MG/DL — HIGH (ref 70–99)
GLUCOSE BLDC GLUCOMTR-MCNC: 141 MG/DL — HIGH (ref 70–99)
GLUCOSE BLDC GLUCOMTR-MCNC: 199 MG/DL — HIGH (ref 70–99)
GLUCOSE BLDC GLUCOMTR-MCNC: 199 MG/DL — HIGH (ref 70–99)
GLUCOSE SERPL-MCNC: 131 MG/DL — HIGH (ref 70–99)
GLUCOSE SERPL-MCNC: 131 MG/DL — HIGH (ref 70–99)
HCT VFR BLD CALC: 26.4 % — LOW (ref 39–50)
HCT VFR BLD CALC: 26.4 % — LOW (ref 39–50)
HGB BLD-MCNC: 8.1 G/DL — LOW (ref 13–17)
HGB BLD-MCNC: 8.1 G/DL — LOW (ref 13–17)
MCHC RBC-ENTMCNC: 30.7 GM/DL — LOW (ref 32–36)
MCHC RBC-ENTMCNC: 30.7 GM/DL — LOW (ref 32–36)
MCHC RBC-ENTMCNC: 31.6 PG — SIGNIFICANT CHANGE UP (ref 27–34)
MCHC RBC-ENTMCNC: 31.6 PG — SIGNIFICANT CHANGE UP (ref 27–34)
MCV RBC AUTO: 103.1 FL — HIGH (ref 80–100)
MCV RBC AUTO: 103.1 FL — HIGH (ref 80–100)
NRBC # BLD: 0 /100 WBCS — SIGNIFICANT CHANGE UP (ref 0–0)
NRBC # BLD: 0 /100 WBCS — SIGNIFICANT CHANGE UP (ref 0–0)
PLATELET # BLD AUTO: 144 K/UL — LOW (ref 150–400)
PLATELET # BLD AUTO: 144 K/UL — LOW (ref 150–400)
POTASSIUM SERPL-MCNC: 3.7 MMOL/L — SIGNIFICANT CHANGE UP (ref 3.5–5.3)
POTASSIUM SERPL-MCNC: 3.7 MMOL/L — SIGNIFICANT CHANGE UP (ref 3.5–5.3)
POTASSIUM SERPL-SCNC: 3.7 MMOL/L — SIGNIFICANT CHANGE UP (ref 3.5–5.3)
POTASSIUM SERPL-SCNC: 3.7 MMOL/L — SIGNIFICANT CHANGE UP (ref 3.5–5.3)
RBC # BLD: 2.56 M/UL — LOW (ref 4.2–5.8)
RBC # BLD: 2.56 M/UL — LOW (ref 4.2–5.8)
RBC # FLD: 20.2 % — HIGH (ref 10.3–14.5)
RBC # FLD: 20.2 % — HIGH (ref 10.3–14.5)
SODIUM SERPL-SCNC: 140 MMOL/L — SIGNIFICANT CHANGE UP (ref 135–145)
SODIUM SERPL-SCNC: 140 MMOL/L — SIGNIFICANT CHANGE UP (ref 135–145)
WBC # BLD: 13.95 K/UL — HIGH (ref 3.8–10.5)
WBC # BLD: 13.95 K/UL — HIGH (ref 3.8–10.5)
WBC # FLD AUTO: 13.95 K/UL — HIGH (ref 3.8–10.5)
WBC # FLD AUTO: 13.95 K/UL — HIGH (ref 3.8–10.5)

## 2023-11-02 PROCEDURE — 85045 AUTOMATED RETICULOCYTE COUNT: CPT

## 2023-11-02 PROCEDURE — 80061 LIPID PANEL: CPT

## 2023-11-02 PROCEDURE — 84154 ASSAY OF PSA FREE: CPT

## 2023-11-02 PROCEDURE — 84153 ASSAY OF PSA TOTAL: CPT

## 2023-11-02 PROCEDURE — 85025 COMPLETE CBC W/AUTO DIFF WBC: CPT

## 2023-11-02 PROCEDURE — 80048 BASIC METABOLIC PNL TOTAL CA: CPT

## 2023-11-02 PROCEDURE — 36430 TRANSFUSION BLD/BLD COMPNT: CPT

## 2023-11-02 PROCEDURE — 82330 ASSAY OF CALCIUM: CPT

## 2023-11-02 PROCEDURE — 88341 IMHCHEM/IMCYTCHM EA ADD ANTB: CPT

## 2023-11-02 PROCEDURE — 84443 ASSAY THYROID STIM HORMONE: CPT

## 2023-11-02 PROCEDURE — 99285 EMERGENCY DEPT VISIT HI MDM: CPT

## 2023-11-02 PROCEDURE — 74177 CT ABD & PELVIS W/CONTRAST: CPT | Mod: MA

## 2023-11-02 PROCEDURE — 82803 BLOOD GASES ANY COMBINATION: CPT

## 2023-11-02 PROCEDURE — 86850 RBC ANTIBODY SCREEN: CPT

## 2023-11-02 PROCEDURE — 83615 LACTATE (LD) (LDH) ENZYME: CPT

## 2023-11-02 PROCEDURE — 82728 ASSAY OF FERRITIN: CPT

## 2023-11-02 PROCEDURE — 87086 URINE CULTURE/COLONY COUNT: CPT

## 2023-11-02 PROCEDURE — 82435 ASSAY OF BLOOD CHLORIDE: CPT

## 2023-11-02 PROCEDURE — 82746 ASSAY OF FOLIC ACID SERUM: CPT

## 2023-11-02 PROCEDURE — 85014 HEMATOCRIT: CPT

## 2023-11-02 PROCEDURE — 84295 ASSAY OF SERUM SODIUM: CPT

## 2023-11-02 PROCEDURE — 84480 ASSAY TRIIODOTHYRONINE (T3): CPT

## 2023-11-02 PROCEDURE — C8929: CPT

## 2023-11-02 PROCEDURE — 83036 HEMOGLOBIN GLYCOSYLATED A1C: CPT

## 2023-11-02 PROCEDURE — 86901 BLOOD TYPING SEROLOGIC RH(D): CPT

## 2023-11-02 PROCEDURE — 86923 COMPATIBILITY TEST ELECTRIC: CPT

## 2023-11-02 PROCEDURE — 83605 ASSAY OF LACTIC ACID: CPT

## 2023-11-02 PROCEDURE — P9016: CPT

## 2023-11-02 PROCEDURE — C9399: CPT

## 2023-11-02 PROCEDURE — 84436 ASSAY OF TOTAL THYROXINE: CPT

## 2023-11-02 PROCEDURE — 83010 ASSAY OF HAPTOGLOBIN QUANT: CPT

## 2023-11-02 PROCEDURE — 84132 ASSAY OF SERUM POTASSIUM: CPT

## 2023-11-02 PROCEDURE — 86803 HEPATITIS C AB TEST: CPT

## 2023-11-02 PROCEDURE — 82962 GLUCOSE BLOOD TEST: CPT

## 2023-11-02 PROCEDURE — 85018 HEMOGLOBIN: CPT

## 2023-11-02 PROCEDURE — 85027 COMPLETE CBC AUTOMATED: CPT

## 2023-11-02 PROCEDURE — 83540 ASSAY OF IRON: CPT

## 2023-11-02 PROCEDURE — 36415 COLL VENOUS BLD VENIPUNCTURE: CPT

## 2023-11-02 PROCEDURE — 82607 VITAMIN B-12: CPT

## 2023-11-02 PROCEDURE — 99231 SBSQ HOSP IP/OBS SF/LOW 25: CPT | Mod: GC

## 2023-11-02 PROCEDURE — 87040 BLOOD CULTURE FOR BACTERIA: CPT

## 2023-11-02 PROCEDURE — 82010 KETONE BODYS QUAN: CPT

## 2023-11-02 PROCEDURE — 85610 PROTHROMBIN TIME: CPT

## 2023-11-02 PROCEDURE — P9040: CPT

## 2023-11-02 PROCEDURE — 88307 TISSUE EXAM BY PATHOLOGIST: CPT

## 2023-11-02 PROCEDURE — 86900 BLOOD TYPING SEROLOGIC ABO: CPT

## 2023-11-02 PROCEDURE — 81001 URINALYSIS AUTO W/SCOPE: CPT

## 2023-11-02 PROCEDURE — 80076 HEPATIC FUNCTION PANEL: CPT

## 2023-11-02 PROCEDURE — 85730 THROMBOPLASTIN TIME PARTIAL: CPT

## 2023-11-02 PROCEDURE — 82947 ASSAY GLUCOSE BLOOD QUANT: CPT

## 2023-11-02 PROCEDURE — 71045 X-RAY EXAM CHEST 1 VIEW: CPT

## 2023-11-02 RX ORDER — INSULIN GLARGINE 100 [IU]/ML
40 INJECTION, SOLUTION SUBCUTANEOUS
Qty: 1 | Refills: 0
Start: 2023-11-02 | End: 2023-12-01

## 2023-11-02 RX ORDER — SITAGLIPTIN AND METFORMIN HYDROCHLORIDE 500; 50 MG/1; MG/1
1 TABLET, FILM COATED ORAL
Qty: 60 | Refills: 0
Start: 2023-11-02 | End: 2023-12-01

## 2023-11-02 RX ORDER — INSULIN GLARGINE 100 [IU]/ML
40 INJECTION, SOLUTION SUBCUTANEOUS
Qty: 1 | Refills: 0
Start: 2023-11-02

## 2023-11-02 RX ORDER — LOSARTAN POTASSIUM 100 MG/1
1 TABLET, FILM COATED ORAL
Qty: 30 | Refills: 0
Start: 2023-11-02 | End: 2023-12-01

## 2023-11-02 RX ORDER — PREGABALIN 225 MG/1
1 CAPSULE ORAL
Qty: 30 | Refills: 0
Start: 2023-11-02 | End: 2023-12-01

## 2023-11-02 RX ORDER — ISOPROPYL ALCOHOL, BENZOCAINE .7; .06 ML/ML; ML/ML
0 SWAB TOPICAL
Qty: 100 | Refills: 1
Start: 2023-11-02

## 2023-11-02 RX ORDER — LIDOCAINE 4 G/100G
1 CREAM TOPICAL
Qty: 1 | Refills: 0
Start: 2023-11-02 | End: 2023-11-08

## 2023-11-02 RX ORDER — GLIMEPIRIDE 1 MG
1 TABLET ORAL
Qty: 30 | Refills: 0
Start: 2023-11-02 | End: 2023-12-01

## 2023-11-02 RX ORDER — FOLIC ACID 0.8 MG
1 TABLET ORAL
Qty: 30 | Refills: 0
Start: 2023-11-02 | End: 2023-12-01

## 2023-11-02 RX ORDER — INSULIN LISPRO 100/ML
12 VIAL (ML) SUBCUTANEOUS
Refills: 0 | Status: DISCONTINUED | OUTPATIENT
Start: 2023-11-02 | End: 2023-11-02

## 2023-11-02 RX ORDER — TAMSULOSIN HYDROCHLORIDE 0.4 MG/1
1 CAPSULE ORAL
Qty: 30 | Refills: 0
Start: 2023-11-02 | End: 2023-12-01

## 2023-11-02 RX ORDER — ACETAMINOPHEN 500 MG
2 TABLET ORAL
Qty: 0 | Refills: 0 | DISCHARGE
Start: 2023-11-02

## 2023-11-02 RX ORDER — OXYBUTYNIN CHLORIDE 5 MG
1 TABLET ORAL
Qty: 90 | Refills: 0
Start: 2023-11-02 | End: 2023-12-01

## 2023-11-02 RX ADMIN — Medication 1: at 13:47

## 2023-11-02 RX ADMIN — LIDOCAINE 1 APPLICATION(S): 4 CREAM TOPICAL at 05:42

## 2023-11-02 RX ADMIN — Medication 11 UNIT(S): at 13:48

## 2023-11-02 RX ADMIN — Medication 81 MILLIGRAM(S): at 12:17

## 2023-11-02 RX ADMIN — LOSARTAN POTASSIUM 25 MILLIGRAM(S): 100 TABLET, FILM COATED ORAL at 05:42

## 2023-11-02 RX ADMIN — PREGABALIN 1000 MICROGRAM(S): 225 CAPSULE ORAL at 12:17

## 2023-11-02 RX ADMIN — Medication 5 MILLIGRAM(S): at 13:48

## 2023-11-02 RX ADMIN — Medication 5 MILLIGRAM(S): at 05:42

## 2023-11-02 RX ADMIN — Medication 11 UNIT(S): at 08:27

## 2023-11-02 RX ADMIN — Medication 12 UNIT(S): at 18:11

## 2023-11-02 RX ADMIN — LIDOCAINE 1 APPLICATION(S): 4 CREAM TOPICAL at 13:48

## 2023-11-02 RX ADMIN — Medication 1 MILLIGRAM(S): at 12:18

## 2023-11-02 NOTE — DISCHARGE NOTE PROVIDER - CARE PROVIDERS DIRECT ADDRESSES
,[email or web address],DirectAddress_Unknown,DirectAddress_Unknown ,[email or web address],DirectAddress_Unknown,DirectAddress_Unknown,DirectAddress_Unknown

## 2023-11-02 NOTE — DISCHARGE NOTE NURSING/CASE MANAGEMENT/SOCIAL WORK - NSDCFUADDAPPT_GEN_ALL_CORE_FT
APPTS ARE READY TO BE MADE: [x] YES: Dr Sifuentes    Best Family or Patient Contact (if needed):    Additional Information about above appointments (if needed):    1: Dr Rahman  2:  Dr. Dominguez  3: Dr. Wheat  4. An appointment was made for you to see Dr Sifuentes on 11/13 at 12 pm, please bring all your medications and DC instructions and all the Finger stick readings. call [phone] on 11/10 to confirm  Other comments or requests:

## 2023-11-02 NOTE — PROGRESS NOTE ADULT - SUBJECTIVE AND OBJECTIVE BOX
Subjective  Patient seen and examined this morning. Resting comfortably. Voiding since fisher removed yesterday.    Objective    Vital signs  T(F): , Max: 99.5 (11-01-23 @ 22:37)  HR: 73 (11-02-23 @ 05:02)  BP: 124/75 (11-02-23 @ 05:02)  SpO2: 94% (11-02-23 @ 05:02)  Wt(kg): --    Output     OUT:    Indwelling Catheter - Urethral (mL): 700 mL    Voided (mL): 375 mL  Total OUT: 1075 mL    Total NET: -1075 mL      Gen: NAD  Abd: soft, nontender, nondistended  : Voiding pink tinged urine. Low PVRs      Labs      11-01 @ 07:12    WBC 16.78 / Hct 27.3  / SCr 1.16         Culture - Blood (collected 10-31-23 @ 00:15)  Source: .Blood Blood-Peripheral  Preliminary Report (11-02-23 @ 04:02):    No growth at 48 Hours    Culture - Blood (collected 10-31-23 @ 00:15)  Source: .Blood Blood-Peripheral  Preliminary Report (11-02-23 @ 04:02):    No growth at 48 Hours    Culture - Urine (collected 10-28-23 @ 01:51)  Source: Clean Catch Clean Catch (Midstream)  Final Report (10-29-23 @ 08:02):    <10,000 CFU/mL Normal Urogenital Sol

## 2023-11-02 NOTE — PROGRESS NOTE ADULT - ASSESSMENT
63y old Male with no PMHx, and has not seen a PMD recently who presents with painless hematuria for 2 weeks, passing clots, not on AC.    Denies any lightheadedness, dizziness, shortness of breath, abdominal pain. Feels a little bit of "retaining feeling" however has been urinating normally and spontaneously. Denies any fever/chills, abdominal pain, flank pain. No nausea/vomiting. No prostate history. No cancer history however has not had a regular check up with doctors in a while.  He states that he noticed increased difficulty voiding and pain at the tip of the penis with the bleeding.   denies any flank pain, nausea/vomiting, fevers, chills.    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 Path pending.       voiding well after fisher removal. endo recs appreciated for treatment of new onset DM. will need endo and  f/u, also new onset anemia and HTN, will need PCP referral and HEME F/U    compled prophylactic CTX 2/2 manipulation w CBI    cont flomax, oxybutinin    3mm UVJ stone, L    BP is normal, cont 25 mg Losartan.    DVT ppx w SCD    DC home today

## 2023-11-02 NOTE — PROGRESS NOTE ADULT - ATTENDING COMMENTS
See H&P. COntinued hematuria. WIll likely need OR for cystoscopy, clot evacuation, fulguration and possible transurethral resection of bladder tumor. Needs medical optimization.
As above  Outpatient follow up in 2 weeks to discuss pathology and further management
Pt seen and examined. Pt with improved urine color this am and did not want intervention today. Scheduled for tomorrow for cysto clot evac and poss TURBT, poss L ureteroscopy for stone. Intermittent clots. Still pending some cards clearance.   --NPO after midnight  --Clearances  --OR tomorrow (10/31)
Pt seen and examined. Urbina removed. Will monitor color. Will arrange for outpt follow-up in 1.5-2 weeks for review of pathology
See H&P. COntinued hematuria. WIll likely need OR for cystoscopy, clot evacuation, fulguration and possible transurethral resection of bladder tumor. Needs medical optimization.

## 2023-11-02 NOTE — PROGRESS NOTE ADULT - PROBLEM SELECTOR PLAN 2
as above
as above
Suggest to continue medications, monitoring, FU primary team recommendations.
as above
Suggest to continue medications, monitoring, FU primary team recommendations.
as above
as above

## 2023-11-02 NOTE — DISCHARGE NOTE PROVIDER - PROVIDER TOKENS
PROVIDER:[TOKEN:[[id number]],FOLLOWUP:[1 week]],PROVIDER:[TOKEN:[[id number]],FOLLOWUP:[1 week]],PROVIDER:[TOKEN:[[id number]],FOLLOWUP:[1 week]] PROVIDER:[TOKEN:[[id number]],FOLLOWUP:[1 week]],PROVIDER:[TOKEN:[[id number]],FOLLOWUP:[1 week]],PROVIDER:[TOKEN:[[id number]],FOLLOWUP:[1 week]],PROVIDER:[TOKEN:[[id number]]]

## 2023-11-02 NOTE — PROGRESS NOTE ADULT - SUBJECTIVE AND OBJECTIVE BOX
Chief complaint  Patient is a 63y old  Male who presents with a chief complaint of        Labs and Fingersticks  CAPILLARY BLOOD GLUCOSE      POCT Blood Glucose.: 199 mg/dL (02 Nov 2023 13:39)  POCT Blood Glucose.: 141 mg/dL (02 Nov 2023 08:23)  POCT Blood Glucose.: 241 mg/dL (01 Nov 2023 22:29)  POCT Blood Glucose.: 190 mg/dL (01 Nov 2023 17:24)      Anion Gap: 11 (11-02 @ 07:54)  Anion Gap: 10 (11-01 @ 07:12)      Calcium: 7.5 *L* (11-02 @ 07:54)  Calcium: 7.8 *L* (11-01 @ 07:12)          11-02    140  |  105  |  16  ----------------------------<  131<H>  3.7   |  24  |  1.20    Ca    7.5<L>      02 Nov 2023 07:54                          8.1    13.95 )-----------( 144      ( 02 Nov 2023 07:54 )             26.4     Medications  MEDICATIONS  (STANDING):  aspirin enteric coated 81 milliGRAM(s) Oral daily  cyanocobalamin 1000 MICROGram(s) Oral daily  dextrose 5%. 1000 milliLiter(s) (50 mL/Hr) IV Continuous <Continuous>  dextrose 5%. 1000 milliLiter(s) (100 mL/Hr) IV Continuous <Continuous>  dextrose 50% Injectable 25 Gram(s) IV Push once  dextrose 50% Injectable 12.5 Gram(s) IV Push once  dextrose 50% Injectable 25 Gram(s) IV Push once  folic acid 1 milliGRAM(s) Oral daily  glucagon  Injectable 1 milliGRAM(s) IntraMuscular once  influenza  Vaccine (HIGH DOSE) 0.7 milliLiter(s) IntraMuscular once  insulin glargine Injectable (LANTUS) 44 Unit(s) SubCutaneous at bedtime  insulin lispro (ADMELOG) corrective regimen sliding scale   SubCutaneous Before meals and at bedtime  insulin lispro Injectable (ADMELOG) 11 Unit(s) SubCutaneous three times a day before meals  lidocaine 2% Gel 1 Application(s) Topical three times a day  losartan 25 milliGRAM(s) Oral daily  oxybutynin 5 milliGRAM(s) Oral three times a day  sodium chloride 0.9%. 1000 milliLiter(s) (100 mL/Hr) IV Continuous <Continuous>  tamsulosin 0.4 milliGRAM(s) Oral at bedtime      Physical Exam  General: Patient comfortable in bed   Vital Signs Last 12 Hrs  T(F): 98.5 (11-02-23 @ 13:47), Max: 99.1 (11-02-23 @ 05:02)  HR: 72 (11-02-23 @ 13:47) (72 - 74)  BP: 132/72 (11-02-23 @ 13:47) (117/72 - 132/72)  BP(mean): --  RR: 18 (11-02-23 @ 13:47) (18 - 18)  SpO2: 94% (11-02-23 @ 13:47) (94% - 94%)    CVS: S1S2   Respiratory: No wheezing, no crepitations  GI: Abdomen soft, bowel sounds positive  Musculoskeletal:  moves all extremities  : Voiding

## 2023-11-02 NOTE — PROGRESS NOTE ADULT - PROBLEM SELECTOR PROBLEM 2
Hematuria
Ureteral stone with hydronephrosis
Hematuria
Ureteral stone with hydronephrosis
Hematuria
Hematuria
Ureteral stone with hydronephrosis

## 2023-11-02 NOTE — DISCHARGE NOTE PROVIDER - NSDCFUSCHEDAPPT_GEN_ALL_CORE_FT
Clarice Rahman  [facility] Physician [facility]  UROLOGY [address] [facility]  Scheduled Appointment: 11/17/2023

## 2023-11-02 NOTE — DISCHARGE NOTE PROVIDER - CARE PROVIDER_API CALL
Clarice Rahman  Urology  [address] [facility], [address]  [place] [address]  Phone: [phone]  Fax: [phone]  Follow Up Time: 1 week    Ric Wheatard  Hematology  [address] [facility], [address]  [place] [address]  Phone: [phone]  Fax: [phone]  Follow Up Time: 1 week    Essence Dominguez)  Endocrinology/Metab/Diabetes  [address] [place] [address]  Phone: [phone]  Fax: [phone]  Follow Up Time: 1 week   Clarice Rahman  Urology  [address] [place] [address]  Phone: [phone]  Fax: [phone]  Follow Up Time: 1 week    Ric Wheat  Hematology  [address] [facility] [address]  [place] [address]  Phone: [phone]  Fax: [phone]  Follow Up Time: 1 week    Essence Dominguez)  Endocrinology/Metab/Diabetes  [address] [place] [address]  Phone: [phone]  Fax: [phone]  Follow Up Time: 1 week    Shayy Lin  Internal Medicine  [address] [place] [address]  Phone: [phone]  Fax: [phone]  Follow Up Time:

## 2023-11-02 NOTE — PROGRESS NOTE ADULT - PROVIDER SPECIALTY LIST ADULT
Internal Medicine
Urology
Heme/Onc
Urology
Urology
Cardiology
Internal Medicine
Internal Medicine
Urology
Endocrinology
Cardiology
Endocrinology
Cardiology
Cardiology
Endocrinology
Endocrinology
Cardiology

## 2023-11-02 NOTE — PROGRESS NOTE ADULT - PROBLEM SELECTOR PLAN 3
Smoking cessation counseling
Suggest to continue medications, monitoring, FU primary team recommendations.

## 2023-11-02 NOTE — PROGRESS NOTE ADULT - PROBLEM SELECTOR PLAN 1
Will continue Lantus to 44 units at bed time.  Will continue   Admelog to 11  units before each meal in addition to Admelog correction scale coverage.  Will continue monitoring FS, log, and glucose trends, will Follow up.  Patient counseled for compliance with consistent low carb diet and exercise as tolerated outpatient.  DC plan  Discharge on Lantus nightly + PO DM regimen  Will follow insulin requirements. Will continue Lantus to 44 units at bed time.  Will continue   Admelog to 11  units before each meal in addition to Admelog correction scale coverage.  Will continue monitoring FS, log, and glucose trends, will Follow up.  Patient counseled for compliance with consistent low carb diet and exercise as tolerated outpatient.  DC plan  Discharge on Lantus nightly 40 units  Add Janumet 50/1000 mg BID and Glimepiride 2 mg daily with breakfast  FU outpatient

## 2023-11-02 NOTE — DISCHARGE NOTE PROVIDER - NSDCFUADDAPPT_GEN_ALL_CORE_FT
APPTS ARE READY TO BE MADE: [ ] YES    Best Family or Patient Contact (if needed):    Additional Information about above appointments (if needed):    1: Dr Rahman  2:  Dr. Dominguez  3: Dr. Wheat    Other comments or requests:  APPTS ARE READY TO BE MADE: [x] YES: dr sears    Best Family or Patient Contact (if needed):    Additional Information about above appointments (if needed):    1: Dr Rahman  2:  Dr. Dominguez  3: Dr. Wheat  4. an appointment was made for you to see dr sears on 11/13 at 12 pm, please bring all your medications and DC instructions and all the FInger stick readings. call [phone] on 11/10 to confirm  Other comments or requests:  APPTS ARE READY TO BE MADE: [x] YES: Dr Sifuentes    Best Family or Patient Contact (if needed):    Additional Information about above appointments (if needed):    1: Dr Rahman  2:  Dr. Dominguez  3: Dr. Wheat  4. An appointment was made for you to see Dr Sifuentes on 11/13 at 12 pm, please bring all your medications and DC instructions and all the Finger stick readings. call [phone] on 11/10 to confirm  Other comments or requests:  APPTS ARE READY TO BE MADE: [x] YES: Dr Sifuentes    Best Family or Patient Contact (if needed):    Additional Information about above appointments (if needed):    1: Dr Rahman  2:  Dr. Dominguez  3: Dr. Wheat  4. An appointment was made for you to see Dr Sifuentes on 11/13 at 12 pm, please bring all your medications and DC instructions and all the Finger stick readings. call [phone] on 11/10 to confirm  Other comments or requests:     Patient is previously scheduled for an appointment with Dr. Essence Dominguez on 11/09 at 10am at [address] [place] [address].     Patient is previously scheduled for an appointment with Dr. Clarice Rahman on 11/17 at 12:30pm at [address] [facility]     Patient is previously scheduled for an appointment with Dr. Ric Wheat on 11/28 at 11am at [address] [place] [address]     Patient is previously scheduled for an appointment with Dr. Shayy Fitzpatrick on 11/13 at 12pm at [address] [place] [address]

## 2023-11-02 NOTE — PROGRESS NOTE ADULT - ASSESSMENT
Assessment  DMT2: 63y Male with newly diagnosed DM T2 with hyperglycemia admitted with hematuria, A1C is 10.2%, blood sugars are running high, eating meals, compliant with low carb diet.  Knows insulin administration, has experience.   Patient is high risk with high level decision making due to uncontrolled diabetes, has increased risk for complications.   Hematuria: Being worked up,  monitored, asymptomatic. s/p Cystocopy, clot removal, CBL      Discussed plan and management with Dr Angelica Burleson NP - TEAMS  Essence Dominguez MD  Cell: [phone]  Office: [phone]

## 2023-11-02 NOTE — DISCHARGE NOTE PROVIDER - NSDCCPCAREPLAN_GEN_ALL_CORE_FT
PRINCIPAL DISCHARGE DIAGNOSIS  Diagnosis: Painless hematuria  Assessment and Plan of Treatment:       SECONDARY DISCHARGE DIAGNOSES  Diagnosis: DM (diabetes mellitus)  Assessment and Plan of Treatment:      PRINCIPAL DISCHARGE DIAGNOSIS  Diagnosis: Painless hematuria  Assessment and Plan of Treatment: Please follow up with Dr Rahman to discuss biopsy results .      SECONDARY DISCHARGE DIAGNOSES  Diagnosis: Anemia  Assessment and Plan of Treatment: please follow up with hematologist Dr Wheat for further workup    Diagnosis: Tobacco abuse  Assessment and Plan of Treatment: smoking cessation. please see assistance with your PCP    Diagnosis: DM (diabetes mellitus)  Assessment and Plan of Treatment: please follow up with endocrinology Dr. Dominguez for ongoing care . ·  Problem: DM (diabetes mellitus).    Discharge on Lantus nightly + PO DM regimen  D/C Planning:  -Discussed with patient the importance of Carb consistent diet and exercise as tolerated.   -Recommend nutritional consultation  inpt prior to dc  -Discussed glycemic goal of a1c <7% to prevent microvascular complications of diabetes mellitus and reduce the risk of macrovascular complications.  - Make sure patient knows how to inject insulin and check fingersticks with glucometer (ask bedside RN for teaching)  - Discharge on premeal insulin and Lantus. do not dc on correction scale or bedtime scale.  - At home, Patient should check FSBG premeals and bedtime. Pt should call their doctor when FSBG <70 or above >400 and or consistently above 200s as changes in the regimen will have to be made.  -pt should call PMD for DM related questions or concerns until pt is seen by CDE or endocrine  -Recommend annual podiatry and ophthalmology follow up.     -------------------------------------------------------------------------------------------------------------------------------------     Humalog Flexpen up to ___ dispense 15ml- can try alternating with one of following  if not covered try alternative: novolog/apidra/admelog/fiasp          PRINCIPAL DISCHARGE DIAGNOSIS  Diagnosis: Painless hematuria  Assessment and Plan of Treatment: On 10/30 underwent  cystoscopy /TURBT -> clot evacuation on 10/31 where a large left lateral wall bladder tumor was seen and resected.  Patient to follow up with Dr. Rahman to discuss biopsy results in 2 weeks        SECONDARY DISCHARGE DIAGNOSES  Diagnosis: DM (diabetes mellitus)  Assessment and Plan of Treatment: Hgb a1c 10.8   Please follow up with endocrinology Dr. Dominguez for ongoing care . ·  Problem: DM (diabetes mellitus).    Discharge on Lantus nightly + PO DM regimen  D/C Planning:  -Discussed with patient the importance of Carb consistent diet and exercise as tolerated.   -Recommend nutritional consultation  inpt prior to dc  -Discussed glycemic goal of a1c <7% to prevent microvascular complications of diabetes mellitus and reduce the risk of macrovascular complications.  - Make sure patient knows how to inject insulin and check fingersticks with glucometer (ask bedside RN for teaching)  - Discharge on premeal insulin and Lantus. do not dc on correction scale or bedtime scale.  - At home, Patient should check FSBG premeals and bedtime. Pt should call their doctor when FSBG <70 or above >400 and or consistently above 200s as changes in the regimen will have to be made.  -pt should call PMD for DM related questions or concerns until pt is seen by CDE or endocrine  -Recommend annual podiatry and ophthalmology follow up.         Diagnosis: Tobacco abuse  Assessment and Plan of Treatment: smoking cessation. please see assistance with your PCP    Diagnosis: Anemia  Assessment and Plan of Treatment: please follow up with hematologist Dr Wheat for further workup

## 2023-11-02 NOTE — PROGRESS NOTE ADULT - PROBLEM SELECTOR PROBLEM 1
DM (diabetes mellitus)
Hematuria
DM (diabetes mellitus)
DM (diabetes mellitus)
Hematuria
Hematuria
DM (diabetes mellitus)

## 2023-11-02 NOTE — PROGRESS NOTE ADULT - SUBJECTIVE AND OBJECTIVE BOX
Patient is a 63y old  Male who presents with a chief complaint of     SUBJECTIVE / OVERNIGHT EVENTS: voiding well after fisher removal. endo recs appreciated for treatment of new onset DM. will need endo and  f/u, also new onset anemia and HTN, will need PCP referral and HEME F/U    MEDICATIONS  (STANDING):  aspirin enteric coated 81 milliGRAM(s) Oral daily  cyanocobalamin 1000 MICROGram(s) Oral daily  dextrose 5%. 1000 milliLiter(s) (50 mL/Hr) IV Continuous <Continuous>  dextrose 5%. 1000 milliLiter(s) (100 mL/Hr) IV Continuous <Continuous>  dextrose 50% Injectable 25 Gram(s) IV Push once  dextrose 50% Injectable 12.5 Gram(s) IV Push once  dextrose 50% Injectable 25 Gram(s) IV Push once  folic acid 1 milliGRAM(s) Oral daily  glucagon  Injectable 1 milliGRAM(s) IntraMuscular once  influenza  Vaccine (HIGH DOSE) 0.7 milliLiter(s) IntraMuscular once  insulin glargine Injectable (LANTUS) 44 Unit(s) SubCutaneous at bedtime  insulin lispro (ADMELOG) corrective regimen sliding scale   SubCutaneous Before meals and at bedtime  insulin lispro Injectable (ADMELOG) 12 Unit(s) SubCutaneous three times a day with meals  lidocaine 2% Gel 1 Application(s) Topical three times a day  losartan 25 milliGRAM(s) Oral daily  oxybutynin 5 milliGRAM(s) Oral three times a day  sodium chloride 0.9%. 1000 milliLiter(s) (100 mL/Hr) IV Continuous <Continuous>  tamsulosin 0.4 milliGRAM(s) Oral at bedtime    MEDICATIONS  (PRN):  acetaminophen     Tablet .. 650 milliGRAM(s) Oral every 6 hours PRN Temp greater or equal to 38C (100.4F), Mild Pain (1 - 3)  dextrose Oral Gel 15 Gram(s) Oral once PRN Blood Glucose LESS THAN 70 milliGRAM(s)/deciliter  morphine  - Injectable 4 milliGRAM(s) IV Push every 6 hours PRN Severe Pain (7 - 10)  oxyCODONE    IR 2.5 milliGRAM(s) Oral every 6 hours PRN penile pain      Vital Signs Last 24 Hrs  T(F): 98.5 (11-02-23 @ 13:47), Max: 99.5 (11-01-23 @ 22:37)  HR: 72 (11-02-23 @ 13:47) (72 - 80)  BP: 132/72 (11-02-23 @ 13:47) (117/72 - 132/72)  RR: 18 (11-02-23 @ 13:47) (18 - 18)  SpO2: 94% (11-02-23 @ 13:47) (92% - 94%)  Telemetry:   CAPILLARY BLOOD GLUCOSE      POCT Blood Glucose.: 199 mg/dL (02 Nov 2023 13:39)  POCT Blood Glucose.: 141 mg/dL (02 Nov 2023 08:23)  POCT Blood Glucose.: 241 mg/dL (01 Nov 2023 22:29)  POCT Blood Glucose.: 190 mg/dL (01 Nov 2023 17:24)    I&O's Summary    01 Nov 2023 07:01  -  02 Nov 2023 07:00  --------------------------------------------------------  IN: 1120 mL / OUT: 1075 mL / NET: 45 mL    02 Nov 2023 07:01  -  02 Nov 2023 16:15  --------------------------------------------------------  IN: 480 mL / OUT: 300 mL / NET: 180 mL        PHYSICAL EXAM:  GENERAL: NAD, well-developed  HEAD:  Atraumatic, Normocephalic  EYES: EOMI, PERRLA, conjunctiva and sclera clear  NECK: Supple, No JVD  CHEST/LUNG: Clear to auscultation bilaterally; No wheeze  HEART: Regular rate and rhythm; No murmurs, rubs, or gallops  ABDOMEN: Soft, Nontender, Nondistended; Bowel sounds present  EXTREMITIES:  2+ Peripheral Pulses, No clubbing, cyanosis, or edema  PSYCH: AAOx3  NEUROLOGY: non-focal  SKIN: No rashes or lesions    LABS:                        8.1    13.95 )-----------( 144      ( 02 Nov 2023 07:54 )             26.4     11-02    140  |  105  |  16  ----------------------------<  131<H>  3.7   |  24  |  1.20    Ca    7.5<L>      02 Nov 2023 07:54            Urinalysis Basic - ( 02 Nov 2023 07:54 )    Color: x / Appearance: x / SG: x / pH: x  Gluc: 131 mg/dL / Ketone: x  / Bili: x / Urobili: x   Blood: x / Protein: x / Nitrite: x   Leuk Esterase: x / RBC: x / WBC x   Sq Epi: x / Non Sq Epi: x / Bacteria: x        RADIOLOGY & ADDITIONAL TESTS:    Imaging Personally Reviewed:    Consultant(s) Notes Reviewed:      Care Discussed with Consultants/Other Providers:

## 2023-11-02 NOTE — DISCHARGE NOTE NURSING/CASE MANAGEMENT/SOCIAL WORK - PATIENT PORTAL LINK FT
You can access the FollowMyHealth Patient Portal offered by [facility] by registering at the following website: [email or web address]. By joining [employer]’s FollowMyHealth portal, you will also be able to view your health information using other applications (apps) compatible with our system.

## 2023-11-02 NOTE — PROGRESS NOTE ADULT - SUBJECTIVE AND OBJECTIVE BOX
Subjective: Patient seen and examined. No new events except as noted.     REVIEW OF SYSTEMS:    CONSTITUTIONAL: +weakness, fevers or chills  EYES/ENT: No visual changes;  No vertigo or throat pain   NECK: No pain or stiffness  RESPIRATORY: No cough, wheezing, hemoptysis; No shortness of breath  CARDIOVASCULAR: No chest pain or palpitations  GASTROINTESTINAL: No abdominal or epigastric pain. No nausea, vomiting, or hematemesis; No diarrhea or constipation. No melena or hematochezia.  GENITOURINARY: No dysuria, frequency or hematuria  NEUROLOGICAL: No numbness or weakness  SKIN: No itching, burning, rashes, or lesions   All other review of systems is negative unless indicated above.    MEDICATIONS:  MEDICATIONS  (STANDING):  aspirin enteric coated 81 milliGRAM(s) Oral daily  cyanocobalamin 1000 MICROGram(s) Oral daily  dextrose 5%. 1000 milliLiter(s) (100 mL/Hr) IV Continuous <Continuous>  dextrose 5%. 1000 milliLiter(s) (50 mL/Hr) IV Continuous <Continuous>  dextrose 50% Injectable 25 Gram(s) IV Push once  dextrose 50% Injectable 12.5 Gram(s) IV Push once  dextrose 50% Injectable 25 Gram(s) IV Push once  folic acid 1 milliGRAM(s) Oral daily  glucagon  Injectable 1 milliGRAM(s) IntraMuscular once  influenza  Vaccine (HIGH DOSE) 0.7 milliLiter(s) IntraMuscular once  insulin glargine Injectable (LANTUS) 44 Unit(s) SubCutaneous at bedtime  insulin lispro (ADMELOG) corrective regimen sliding scale   SubCutaneous Before meals and at bedtime  insulin lispro Injectable (ADMELOG) 11 Unit(s) SubCutaneous three times a day before meals  lidocaine 2% Gel 1 Application(s) Topical three times a day  losartan 25 milliGRAM(s) Oral daily  oxybutynin 5 milliGRAM(s) Oral three times a day  sodium chloride 0.9%. 1000 milliLiter(s) (100 mL/Hr) IV Continuous <Continuous>  tamsulosin 0.4 milliGRAM(s) Oral at bedtime      PHYSICAL EXAM:  T(C): 37.3 (11-02-23 @ 05:02), Max: 37.5 (11-01-23 @ 22:37)  HR: 73 (11-02-23 @ 05:02) (73 - 80)  BP: 124/75 (11-02-23 @ 05:02) (110/67 - 128/76)  RR: 18 (11-02-23 @ 05:02) (18 - 18)  SpO2: 94% (11-02-23 @ 05:02) (92% - 94%)  Wt(kg): --  I&O's Summary    01 Nov 2023 07:01  -  02 Nov 2023 07:00  --------------------------------------------------------  IN: 1120 mL / OUT: 1075 mL / NET: 45 mL              Appearance: Normal	  HEENT:   Normal oral mucosa, PERRL, EOMI	  Lymphatic: No lymphadenopathy , no edema  Cardiovascular: Normal S1 S2, No JVD, No murmurs , Peripheral pulses palpable 2+ bilaterally  Respiratory: Lungs clear to auscultation, normal effort 	  Gastrointestinal:  Soft, Non-tender, + BS	  Skin: No rashes, No ecchymoses, No cyanosis, warm to touch  Musculoskeletal: Normal range of motion, normal strength  Psychiatry:  Mood & affect appropriate  Ext: chronic venous stasis skin discoloration  +CBI         LABS:    CARDIAC MARKERS:                                8.1    13.95 )-----------( 144      ( 02 Nov 2023 07:54 )             26.4     11-02    140  |  105  |  16  ----------------------------<  131<H>  3.7   |  24  |  1.20    Ca    7.5<L>      02 Nov 2023 07:54      proBNP:   Lipid Profile:   HgA1c:   TSH:             TELEMETRY: 	    ECG:  	  RADIOLOGY:   DIAGNOSTIC TESTING:  [ ] Echocardiogram:  [ ]  Catheterization:  [ ] Stress Test:    OTHER:

## 2023-11-02 NOTE — PROGRESS NOTE ADULT - ASSESSMENT
63 year old male with limited past medical hx admitted for hematuria, clots; L 3mm UVJ calculus. Patient without flank pain and afebrile, medical expulsive therapy for stone management. Due to persistent hematuria, underwent cysto, clot evacuation on 10/31 where a large left lateral wall bladder tumor was seen and resected.    Plan  - F/u pathology results.  - can continue lidocaine jelly for penile pain  - abx per primary  - F/u blood culture - NGTD  - f/u H&H, transfuse as needed  - trend Cr  - IV fluids  -rest of care per medicine  - Plan for outpatient follow up about 2 weeks after discharge with Dr. Rahman to discuss biopsy results and next steps  - Please re-consult urology as needed.    [facility]  [address] [place] [id number] [phone]

## 2023-11-02 NOTE — PROGRESS NOTE ADULT - PROBLEM SELECTOR PROBLEM 3
Ureteral stone with hydronephrosis
Ureteral stone with hydronephrosis
Tobacco abuse
Ureteral stone with hydronephrosis
Tobacco abuse
Ureteral stone with hydronephrosis
Tobacco abuse

## 2023-11-02 NOTE — DISCHARGE NOTE PROVIDER - NSDCMRMEDTOKEN_GEN_ALL_CORE_FT
acetaminophen 325 mg oral tablet: 2 tab(s) orally every 6 hours As needed Temp greater or equal to 38C (100.4F), Mild Pain (1 - 3)  alcohol swabs: Apply topically to affected area 4 times a day  aspirin 81 mg oral delayed release tablet: 1 tab(s) orally once a day  cyanocobalamin 1000 mcg oral tablet: 1 tab(s) orally once a day  folic acid 1 mg oral tablet: 1 tab(s) orally once a day  glucometer (per patient&#x27;s insurance): Test blood sugars four times a day. Dispense #1 glucometer.  lancets: 1 application subcutaneously 4 times a day  lidocaine 2% topical gel with applicator: Apply topically to affected area 3 times a day as needed for  mild pain 1 Apply topically to affected area 3 times a day  losartan 25 mg oral tablet: 1 tab(s) orally once a day  oxyBUTYnin 5 mg oral tablet: 1 tab(s) orally 3 times a day  tamsulosin 0.4 mg oral capsule: 1 cap(s) orally once a day (at bedtime)  test strips (per patient&#x27;s insurance): 1 application subcutaneously 4 times a day. ** Compatible with patient&#x27;s glucometer **  Tylenol 325 mg oral tablet: 2 tab(s) orally as needed for pain   acetaminophen 325 mg oral tablet: 2 tab(s) orally every 6 hours As needed Temp greater or equal to 38C (100.4F), Mild Pain (1 - 3)  alcohol swabs: Apply topically to affected area 4 times a day  aspirin 81 mg oral delayed release tablet: 1 tab(s) orally once a day  Basaglar KwikPen 100 units/mL subcutaneous solution: 40 subcutaneous once a day (at bedtime)  cyanocobalamin 1000 mcg oral tablet: 1 tab(s) orally once a day  folic acid 1 mg oral tablet: 1 tab(s) orally once a day  glimepiride 2 mg oral tablet: 1 tab(s) orally once a day In the morning  glucometer (per patient&#x27;s insurance): Test blood sugars four times a day. Dispense #1 glucometer.  Janumet 50 mg-1000 mg oral tablet: 1 tab(s) orally 2 times a day  lancets: 1 application subcutaneously 4 times a day  Lantus Solostar Pen 100 units/mL subcutaneous solution: 40 subcutaneous once a day (at bedtime) unit(s) subcutaneous once a day  lidocaine 2% topical gel with applicator: Apply topically to affected area 3 times a day as needed for  mild pain 1 Apply topically to affected area 3 times a day  losartan 25 mg oral tablet: 1 tab(s) orally once a day  oxyBUTYnin 5 mg oral tablet: 1 tab(s) orally 3 times a day  tamsulosin 0.4 mg oral capsule: 1 cap(s) orally once a day (at bedtime)  test strips (per patient&#x27;s insurance): 1 application subcutaneously 4 times a day. ** Compatible with patient&#x27;s glucometer **  Tylenol 325 mg oral tablet: 2 tab(s) orally as needed for pain

## 2023-11-02 NOTE — DISCHARGE NOTE PROVIDER - HOSPITAL COURSE
HPI:  63y old Male with no PMHx, and has not seen a PMD recently who presents with painless hematuria for 2 weeks, passing clots, not on AC.    Denies any lightheadedness, dizziness, shortness of breath, abdominal pain. Feels a little bit of "retaining feeling" however has been urinating normally and spontaneously. Denies any fever/chills, abdominal pain, flank pain. No nausea/vomiting. No prostate history. No cancer history however has not had a regular check up with doctors in a while.  He states that he noticed increased difficulty voiding and pain at the tip of the penis with the bleeding.   denies any flank pain, nausea/vomiting, fevers, chills.    In the ER ptn was seen by , CBI placed, ct A/P done, ptn is noted to be hyperglycemic     (28 Oct 2023 16:21)    Hospital Course:      Important Medication Changes and Reason:    Active or Pending Issues Requiring Follow-up:    Advanced Directives:   [ ] Full code  [ ] DNR  [ ] Hospice    Discharge Diagnoses:         HPI:  63y old Male with no PMHx, and has not seen a PMD recently who presents with painless hematuria for 2 weeks, passing clots, not on AC.    Denies any lightheadedness, dizziness, shortness of breath, abdominal pain. Feels a  little bit of "retaining feeling" however has been urinating normally and spontaneously. Denies any fever/chills, abdominal pain, flank pain. No nausea/vomiting. No prostate history. No cancer history however has not had a regular check up with doctors in a while.  He states that he noticed increased difficulty voiding and pain at the tip of the penis with the bleeding.   denies any flank pain, nausea/vomiting, fevers, chills.    In the ER ptn was seen by , CBI placed, ct A/P done, ptn is noted to be hyperglycemic     (28 Oct 2023 16:21)    Hospital Course: 63y old Male with no PMHx, and has not seen a PMD recently who presents with painless hematuria for 2 weeks, passing clots, not on AC.    Denies any lightheadedness, dizziness, shortness of breath, abdominal pain. Feels a little bit of "retaining feeling" however has been urinating normally and spontaneously. Denies any fever/chills, abdominal pain, flank pain. No nausea/vomiting. No prostate history. No cancer history however has not had a regular check up with doctors in a while.  He states that he noticed increased difficulty voiding and pain at the tip of the penis with the bleeding.   denies any flank pain, nausea/vomiting, fevers, chills.    In the ER ptn was seen by , CBI placed, ct A/P done, ptn is noted to be hyperglycemic, and anemic w elevated MCV  Overrall patient presented with hematuria with clots on admission   On 10/30 cystoscopy /TURBT  underwent cysto, clot evacuation on 10/31 where a large left lateral wall bladder tumor was seen and resected.  Patient to follow up with Dr. Rahman to discuss biopsy results in 2 weeks. Patient with new onset DM. FS remain elevated but improved, ENDO input appreciated, insulin dosages adjusted. HA1C 11%. Patient started on insulin with metformin . Patient must follow up with endocrinology for ongoing care as well. BP is normal, cont 25 mg Losartan.        Important Medication Changes and Reason:    Active or Pending Issues Requiring Follow-up:    Advanced Directives:   [ x] Full code  [ ] DNR  [ ] Hospice    Discharge Diagnoses:  hematuria with clots  bladder mass s/p biopsy  New onset diabetes         HPI:  63y old Male with no PMHx, and has not seen a PMD recently who presents with painless hematuria for 2 weeks, passing clots, not on AC.    Denies any lightheadedness, dizziness, shortness of breath, abdominal pain. Feels a  little bit of "retaining feeling" however has been urinating normally and spontaneously. Denies any fever/chills, abdominal pain, flank pain. No nausea/vomiting. No prostate history. No cancer history however has not had a regular check up with doctors in a while.  He states that he noticed increased difficulty voiding and pain at the tip of the penis with the bleeding.   denies any flank pain, nausea/vomiting, fevers, chills.    In the ER ptn was seen by , CBI placed, ct A/P done, ptn is noted to be hyperglycemic     (28 Oct 2023 16:21)    Hospital Course: 63y old Male with no PMHx, and has not seen a PMD recently who presents with painless hematuria for 2 weeks, passing clots, not on AC.    Denies any lightheadedness, dizziness, shortness of breath, abdominal pain. Feels a little bit of "retaining feeling" however has been urinating normally and spontaneously. Denies any fever/chills, abdominal pain, flank pain. No nausea/vomiting. No prostate history. No cancer history however has not had a regular check up with doctors in a while.  He states that he noticed increased difficulty voiding and pain at the tip of the penis with the bleeding.   denies any flank pain, nausea/vomiting, fevers, chills.    In the ER ptn was seen by , CBI placed, ct A/P done, ptn is noted to be hyperglycemic, and anemic w elevated MCV  Overall patient presented with hematuria with clots on admission   Seen by Urology ->On 10/30underwent  cystoscopy /TURBT  ->, clot evacuation on 10/31 where a large left lateral wall bladder tumor was seen and resected.  Patient to follow up with Dr. Rahman to discuss biopsy results in 2 weeks. Patient with new onset DM. FS remain elevated but improved, ENDO input appreciated, insulin dosages adjusted. HgbA1C 11%. Patient started on insulin -> on discharge ->  Lantus nightly 40 units, add Janumet 50/1000 mg BID and Glimepiride 2 mg daily with breakfast  FU outpatient. Patient must follow up with endocrinology for ongoing care as well. BP is normal, cont 25 mg Losartan. Pt is medically stable for discharge home.    Important Medication Changes and Reason: New-> Lantus and antihyperglycemics     Active or Pending Issues Requiring Follow-up: Outpt follow up with Endocrine, Urology, Hematology  and PCP.       Advanced Directives:   [ x] Full code  [ ] DNR  [ ] Hospice    Discharge Diagnoses:  hematuria with clots  bladder mass s/p biopsy  New onset diabetes

## 2023-11-05 LAB
CULTURE RESULTS: SIGNIFICANT CHANGE UP
SPECIMEN SOURCE: SIGNIFICANT CHANGE UP

## 2023-11-08 LAB
SURGICAL PATHOLOGY STUDY: SIGNIFICANT CHANGE UP
SURGICAL PATHOLOGY STUDY: SIGNIFICANT CHANGE UP

## 2023-11-17 ENCOUNTER — APPOINTMENT (OUTPATIENT)
Dept: UROLOGY | Facility: CLINIC | Age: 63
End: 2023-11-17
Payer: COMMERCIAL

## 2023-11-17 VITALS
HEIGHT: 69 IN | HEART RATE: 78 BPM | RESPIRATION RATE: 16 BRPM | WEIGHT: 230 LBS | BODY MASS INDEX: 34.07 KG/M2 | DIASTOLIC BLOOD PRESSURE: 80 MMHG | TEMPERATURE: 97.5 F | OXYGEN SATURATION: 93 % | SYSTOLIC BLOOD PRESSURE: 133 MMHG

## 2023-11-17 PROCEDURE — 99213 OFFICE O/P EST LOW 20 MIN: CPT

## 2023-11-20 LAB
APPEARANCE: ABNORMAL
BACTERIA UR CULT: NORMAL
BACTERIA: NEGATIVE /HPF
BILIRUBIN URINE: NEGATIVE
BLOOD URINE: ABNORMAL
CAST: 3 /LPF
COLOR: YELLOW
EPITHELIAL CELLS: 7 /HPF
GLUCOSE QUALITATIVE U: NEGATIVE MG/DL
KETONES URINE: NEGATIVE MG/DL
LEUKOCYTE ESTERASE URINE: ABNORMAL
MICROSCOPIC-UA: NORMAL
NITRITE URINE: NEGATIVE
PH URINE: 5.5
PROTEIN URINE: 30 MG/DL
RED BLOOD CELLS URINE: 40 /HPF
SPECIFIC GRAVITY URINE: 1.02
UROBILINOGEN URINE: 1 MG/DL
WHITE BLOOD CELLS URINE: 70 /HPF

## 2023-11-21 ENCOUNTER — OUTPATIENT (OUTPATIENT)
Dept: OUTPATIENT SERVICES | Facility: HOSPITAL | Age: 63
LOS: 1 days | End: 2023-11-21
Payer: COMMERCIAL

## 2023-11-21 VITALS
SYSTOLIC BLOOD PRESSURE: 148 MMHG | WEIGHT: 229.06 LBS | DIASTOLIC BLOOD PRESSURE: 90 MMHG | OXYGEN SATURATION: 97 % | HEIGHT: 69 IN | TEMPERATURE: 99 F | RESPIRATION RATE: 20 BRPM | HEART RATE: 77 BPM

## 2023-11-21 DIAGNOSIS — N20.1 CALCULUS OF URETER: ICD-10-CM

## 2023-11-21 DIAGNOSIS — G47.33 OBSTRUCTIVE SLEEP APNEA (ADULT) (PEDIATRIC): ICD-10-CM

## 2023-11-21 DIAGNOSIS — Z01.818 ENCOUNTER FOR OTHER PREPROCEDURAL EXAMINATION: ICD-10-CM

## 2023-11-21 DIAGNOSIS — Z98.890 OTHER SPECIFIED POSTPROCEDURAL STATES: Chronic | ICD-10-CM

## 2023-11-21 DIAGNOSIS — K08.89 OTHER SPECIFIED DISORDERS OF TEETH AND SUPPORTING STRUCTURES: ICD-10-CM

## 2023-11-21 DIAGNOSIS — E11.9 TYPE 2 DIABETES MELLITUS WITHOUT COMPLICATIONS: ICD-10-CM

## 2023-11-21 DIAGNOSIS — D49.4 NEOPLASM OF UNSPECIFIED BEHAVIOR OF BLADDER: ICD-10-CM

## 2023-11-21 LAB
ANION GAP SERPL CALC-SCNC: 11 MMOL/L — SIGNIFICANT CHANGE UP (ref 5–17)
ANION GAP SERPL CALC-SCNC: 11 MMOL/L — SIGNIFICANT CHANGE UP (ref 5–17)
BUN SERPL-MCNC: 15 MG/DL — SIGNIFICANT CHANGE UP (ref 7–23)
BUN SERPL-MCNC: 15 MG/DL — SIGNIFICANT CHANGE UP (ref 7–23)
CALCIUM SERPL-MCNC: 9.4 MG/DL — SIGNIFICANT CHANGE UP (ref 8.4–10.5)
CALCIUM SERPL-MCNC: 9.4 MG/DL — SIGNIFICANT CHANGE UP (ref 8.4–10.5)
CHLORIDE SERPL-SCNC: 102 MMOL/L — SIGNIFICANT CHANGE UP (ref 96–108)
CHLORIDE SERPL-SCNC: 102 MMOL/L — SIGNIFICANT CHANGE UP (ref 96–108)
CO2 SERPL-SCNC: 27 MMOL/L — SIGNIFICANT CHANGE UP (ref 22–31)
CO2 SERPL-SCNC: 27 MMOL/L — SIGNIFICANT CHANGE UP (ref 22–31)
CREAT SERPL-MCNC: 1.21 MG/DL — SIGNIFICANT CHANGE UP (ref 0.5–1.3)
CREAT SERPL-MCNC: 1.21 MG/DL — SIGNIFICANT CHANGE UP (ref 0.5–1.3)
EGFR: 68 ML/MIN/1.73M2 — SIGNIFICANT CHANGE UP
EGFR: 68 ML/MIN/1.73M2 — SIGNIFICANT CHANGE UP
GLUCOSE SERPL-MCNC: 109 MG/DL — HIGH (ref 70–99)
GLUCOSE SERPL-MCNC: 109 MG/DL — HIGH (ref 70–99)
HCT VFR BLD CALC: 37.1 % — LOW (ref 39–50)
HCT VFR BLD CALC: 37.1 % — LOW (ref 39–50)
HGB BLD-MCNC: 11.2 G/DL — LOW (ref 13–17)
HGB BLD-MCNC: 11.2 G/DL — LOW (ref 13–17)
MCHC RBC-ENTMCNC: 29.1 PG — SIGNIFICANT CHANGE UP (ref 27–34)
MCHC RBC-ENTMCNC: 29.1 PG — SIGNIFICANT CHANGE UP (ref 27–34)
MCHC RBC-ENTMCNC: 30.2 GM/DL — LOW (ref 32–36)
MCHC RBC-ENTMCNC: 30.2 GM/DL — LOW (ref 32–36)
MCV RBC AUTO: 96.4 FL — SIGNIFICANT CHANGE UP (ref 80–100)
MCV RBC AUTO: 96.4 FL — SIGNIFICANT CHANGE UP (ref 80–100)
NRBC # BLD: 0 /100 WBCS — SIGNIFICANT CHANGE UP (ref 0–0)
NRBC # BLD: 0 /100 WBCS — SIGNIFICANT CHANGE UP (ref 0–0)
PLATELET # BLD AUTO: 237 K/UL — SIGNIFICANT CHANGE UP (ref 150–400)
PLATELET # BLD AUTO: 237 K/UL — SIGNIFICANT CHANGE UP (ref 150–400)
POTASSIUM SERPL-MCNC: 4.4 MMOL/L — SIGNIFICANT CHANGE UP (ref 3.5–5.3)
POTASSIUM SERPL-MCNC: 4.4 MMOL/L — SIGNIFICANT CHANGE UP (ref 3.5–5.3)
POTASSIUM SERPL-SCNC: 4.4 MMOL/L — SIGNIFICANT CHANGE UP (ref 3.5–5.3)
POTASSIUM SERPL-SCNC: 4.4 MMOL/L — SIGNIFICANT CHANGE UP (ref 3.5–5.3)
RBC # BLD: 3.85 M/UL — LOW (ref 4.2–5.8)
RBC # BLD: 3.85 M/UL — LOW (ref 4.2–5.8)
RBC # FLD: 14.9 % — HIGH (ref 10.3–14.5)
RBC # FLD: 14.9 % — HIGH (ref 10.3–14.5)
SODIUM SERPL-SCNC: 140 MMOL/L — SIGNIFICANT CHANGE UP (ref 135–145)
SODIUM SERPL-SCNC: 140 MMOL/L — SIGNIFICANT CHANGE UP (ref 135–145)
URINE CYTOLOGY: NORMAL
WBC # BLD: 10.03 K/UL — SIGNIFICANT CHANGE UP (ref 3.8–10.5)
WBC # BLD: 10.03 K/UL — SIGNIFICANT CHANGE UP (ref 3.8–10.5)
WBC # FLD AUTO: 10.03 K/UL — SIGNIFICANT CHANGE UP (ref 3.8–10.5)
WBC # FLD AUTO: 10.03 K/UL — SIGNIFICANT CHANGE UP (ref 3.8–10.5)

## 2023-11-21 RX ORDER — LIDOCAINE HCL 20 MG/ML
0.2 VIAL (ML) INJECTION ONCE
Refills: 0 | Status: DISCONTINUED | OUTPATIENT
Start: 2023-11-28 | End: 2023-11-28

## 2023-11-21 RX ORDER — SODIUM CHLORIDE 9 MG/ML
3 INJECTION INTRAMUSCULAR; INTRAVENOUS; SUBCUTANEOUS EVERY 8 HOURS
Refills: 0 | Status: DISCONTINUED | OUTPATIENT
Start: 2023-11-28 | End: 2023-11-28

## 2023-11-21 NOTE — H&P PST ADULT - NSICDXPASTMEDICALHX_GEN_ALL_CORE_FT
PAST MEDICAL HISTORY:  Bladder tumor     DM2 (diabetes mellitus, type 2)     HTN (hypertension)

## 2023-11-21 NOTE — H&P PST ADULT - PROBLEM SELECTOR PLAN 1
Cystoscopy ,Transurethral Resection Of Bladder Tumor , Bilateral Retrograde Pyelogram on 11/21/23.  pre-op instructions discussed  labs , urine culture sent  pt will take HTn medication the day

## 2023-11-21 NOTE — H&P PST ADULT - ASSESSMENT
ACTIVITY-    Energy Expenditure(Mets):      Symptoms-none     Airway:  normal    Mallampati-   3    Dental: Patient has poor dentition - + loose teeth lateral incisor ( lower ) -

## 2023-11-21 NOTE — H&P PST ADULT - NSICDXPASTSURGICALHX_GEN_ALL_CORE_FT
PAST SURGICAL HISTORY:  No significant past surgical history      PAST SURGICAL HISTORY:  H/O transurethral resection of bladder tumor (TURBT)

## 2023-11-21 NOTE — H&P PST ADULT - PROBLEM SELECTOR PLAN 4
advised to see dentist prior to procedure -  pt refused    educated the risk of undergoing anesthesia - pt understates stated " I had a procedure last month  without any issue "

## 2023-11-21 NOTE — H&P PST ADULT - HISTORY OF PRESENT ILLNESS
63y old Male with no PMHx, and has not seen a PMD recently who presents to ED in 10/23 with painless hematuria for 2 weeks, passing clots, not on AC.  He states that he noticed increased difficulty voiding and pain at the tip of the penis with the bleeding.   denies any flank pain, nausea/vomiting, fevers, chills. In the ER ptn was seen by , CBI placed, ct A/P done, also noted to be hyperglycemic, and anemic ,Overall patient presented with hematuria with clots on admission Seen by Urology ->On 10/30underwent  cystoscopy /TURBT  ->, clot evacuation on 10/31 where a large left lateral wall bladder tumor was seen and resected.   63y old Male with no PMHx, and has not seen a PMD past few years ,who presents to ED in 10/29 with painless hematuria for 2 weeks, passing clots, not on AC.  He states that he noticed increased difficulty voiding and pain at the tip of the penis with the bleeding. Pt  denies any flank pain, nausea/vomiting, fevers, chills. pt  was seen by urology, CBI placed, ct A/P done, also noted to be hyperglycemic, and anemic . Pt underwent  cystoscopy /TURBT  ->, clot evacuation on 10/31 where a large left lateral wall bladder tumor was seen and resected.. Presents to PST for scheduled   Cystoscopy ,Transurethral Resection Of Bladder Tumor , Bilateral Retrograde Pyelogram on 11/21/23. 63y old Male with no PMHx, and has not seen a PMD past few years ,who presents to ED in 10/29 with painless hematuria for 2 weeks, passing clots, not on AC.  He states that he noticed increased difficulty voiding and pain at the tip of the penis with the bleeding. Pt  denies any flank pain, nausea/vomiting, fevers, chills.  Pt  was seen by urology, CBI placed, CT A/P done, also noted to be hyperglycemic, and anemic . Pt underwent  cystoscopy /TURBT  ->, clot evacuation on 10/31 where a large left lateral wall bladder tumor was seen and resected. Presents to PST for scheduled   Cystoscopy ,Transurethral Resection Of Bladder Tumor ,Bilateral Retrograde Pyelogram on 11/21/23.

## 2023-11-23 LAB
CULTURE RESULTS: ABNORMAL
CULTURE RESULTS: ABNORMAL
SPECIMEN SOURCE: SIGNIFICANT CHANGE UP
SPECIMEN SOURCE: SIGNIFICANT CHANGE UP

## 2023-11-27 ENCOUNTER — TRANSCRIPTION ENCOUNTER (OUTPATIENT)
Age: 63
End: 2023-11-27

## 2023-11-28 ENCOUNTER — APPOINTMENT (OUTPATIENT)
Dept: UROLOGY | Facility: HOSPITAL | Age: 63
End: 2023-11-28

## 2023-11-28 ENCOUNTER — OUTPATIENT (OUTPATIENT)
Dept: INPATIENT UNIT | Facility: HOSPITAL | Age: 63
LOS: 1 days | End: 2023-11-28
Payer: COMMERCIAL

## 2023-11-28 ENCOUNTER — TRANSCRIPTION ENCOUNTER (OUTPATIENT)
Age: 63
End: 2023-11-28

## 2023-11-28 ENCOUNTER — RESULT REVIEW (OUTPATIENT)
Age: 63
End: 2023-11-28

## 2023-11-28 VITALS
HEART RATE: 73 BPM | RESPIRATION RATE: 18 BRPM | TEMPERATURE: 99 F | HEIGHT: 69 IN | WEIGHT: 229.06 LBS | OXYGEN SATURATION: 98 % | DIASTOLIC BLOOD PRESSURE: 84 MMHG | SYSTOLIC BLOOD PRESSURE: 134 MMHG

## 2023-11-28 VITALS
RESPIRATION RATE: 16 BRPM | HEART RATE: 71 BPM | OXYGEN SATURATION: 96 % | SYSTOLIC BLOOD PRESSURE: 150 MMHG | TEMPERATURE: 97 F | DIASTOLIC BLOOD PRESSURE: 90 MMHG

## 2023-11-28 DIAGNOSIS — N20.1 CALCULUS OF URETER: ICD-10-CM

## 2023-11-28 DIAGNOSIS — Z98.890 OTHER SPECIFIED POSTPROCEDURAL STATES: Chronic | ICD-10-CM

## 2023-11-28 LAB
GLUCOSE BLDC GLUCOMTR-MCNC: 128 MG/DL — HIGH (ref 70–99)
GLUCOSE BLDC GLUCOMTR-MCNC: 128 MG/DL — HIGH (ref 70–99)

## 2023-11-28 PROCEDURE — 76000 FLUOROSCOPY <1 HR PHYS/QHP: CPT

## 2023-11-28 PROCEDURE — 52005 CYSTO W/URTRL CATHJ: CPT | Mod: RT

## 2023-11-28 PROCEDURE — 87086 URINE CULTURE/COLONY COUNT: CPT

## 2023-11-28 PROCEDURE — 85027 COMPLETE CBC AUTOMATED: CPT

## 2023-11-28 PROCEDURE — C1769: CPT

## 2023-11-28 PROCEDURE — 88305 TISSUE EXAM BY PATHOLOGIST: CPT | Mod: 26

## 2023-11-28 PROCEDURE — C1758: CPT

## 2023-11-28 PROCEDURE — G0463: CPT

## 2023-11-28 PROCEDURE — 52235 CYSTOSCOPY AND TREATMENT: CPT

## 2023-11-28 PROCEDURE — 88305 TISSUE EXAM BY PATHOLOGIST: CPT

## 2023-11-28 PROCEDURE — 36415 COLL VENOUS BLD VENIPUNCTURE: CPT

## 2023-11-28 PROCEDURE — 80048 BASIC METABOLIC PNL TOTAL CA: CPT

## 2023-11-28 PROCEDURE — 74420 UROGRAPHY RTRGR +-KUB: CPT | Mod: 26

## 2023-11-28 PROCEDURE — 82962 GLUCOSE BLOOD TEST: CPT

## 2023-11-28 DEVICE — GUIDEWIRE SENSOR DUAL-FLEX NITINOL STRAIGHT .035" X 150CM
Type: IMPLANTABLE DEVICE | Status: NON-FUNCTIONAL
Removed: 2023-11-28

## 2023-11-28 DEVICE — URETERAL CATH OPEN END 5FR 70CM
Type: IMPLANTABLE DEVICE | Status: NON-FUNCTIONAL
Removed: 2023-11-28

## 2023-11-28 RX ORDER — CEFAZOLIN SODIUM 1 G
2000 VIAL (EA) INJECTION ONCE
Refills: 0 | Status: COMPLETED | OUTPATIENT
Start: 2023-11-28 | End: 2023-11-28

## 2023-11-28 RX ORDER — LIDOCAINE HCL 20 MG/ML
5 VIAL (ML) INJECTION EVERY 4 HOURS
Refills: 0 | Status: DISCONTINUED | OUTPATIENT
Start: 2023-11-28 | End: 2023-11-28

## 2023-11-28 RX ORDER — LIDOCAINE HCL 20 MG/ML
5 VIAL (ML) INJECTION ONCE
Refills: 0 | Status: COMPLETED | OUTPATIENT
Start: 2023-11-28 | End: 2023-11-28

## 2023-11-28 RX ADMIN — Medication 5 MILLILITER(S): at 19:41

## 2023-11-28 NOTE — BRIEF OPERATIVE NOTE - OPERATION/FINDINGS
Cystoscopy, TURBT, right retrograde pyelogram  Left ureteral orfice not seen within previous resection bed therefore left pyelogram not done.

## 2023-11-28 NOTE — ASU DISCHARGE PLAN (ADULT/PEDIATRIC) - CARE PROVIDER_API CALL
Clarice Rahman  Urology  [address] [place] [id number]  Phone: [phone]  Fax: [phone]  Follow Up Time:

## 2023-11-28 NOTE — ASU DISCHARGE PLAN (ADULT/PEDIATRIC) - NS MD DC FALL RISK RISK
For information on Fall & Injury Prevention, visit: [email or web address] OR  [email or web address] OR  https://www.cdc.gov/steadi/patient.html

## 2023-11-28 NOTE — ASU PATIENT PROFILE, ADULT - FALL HARM RISK - UNIVERSAL INTERVENTIONS
Bed in lowest position, wheels locked, appropriate side rails in place/Call bell, personal items and telephone in reach/Instruct patient to call for assistance before getting out of bed or chair/Non-slip footwear when patient is out of bed/[place] to call system/Physically safe environment - no spills, clutter or unnecessary equipment/Purposeful Proactive Rounding/Room/bathroom lighting operational, light cord in reach

## 2023-12-04 LAB
SURGICAL PATHOLOGY STUDY: SIGNIFICANT CHANGE UP
SURGICAL PATHOLOGY STUDY: SIGNIFICANT CHANGE UP

## 2023-12-05 ENCOUNTER — NON-APPOINTMENT (OUTPATIENT)
Age: 63
End: 2023-12-05

## 2023-12-08 PROBLEM — E11.9 TYPE 2 DIABETES MELLITUS WITHOUT COMPLICATIONS: Chronic | Status: ACTIVE | Noted: 2023-11-21

## 2023-12-08 PROBLEM — D49.4 NEOPLASM OF UNSPECIFIED BEHAVIOR OF BLADDER: Chronic | Status: ACTIVE | Noted: 2023-11-21

## 2023-12-08 PROBLEM — I10 ESSENTIAL (PRIMARY) HYPERTENSION: Chronic | Status: ACTIVE | Noted: 2023-11-21

## 2023-12-11 ENCOUNTER — RESULT REVIEW (OUTPATIENT)
Age: 63
End: 2023-12-11

## 2023-12-18 ENCOUNTER — APPOINTMENT (OUTPATIENT)
Dept: CT IMAGING | Facility: IMAGING CENTER | Age: 63
End: 2023-12-18
Payer: COMMERCIAL

## 2023-12-18 ENCOUNTER — OUTPATIENT (OUTPATIENT)
Dept: OUTPATIENT SERVICES | Facility: HOSPITAL | Age: 63
LOS: 1 days | End: 2023-12-18
Payer: COMMERCIAL

## 2023-12-18 DIAGNOSIS — C67.0 MALIGNANT NEOPLASM OF TRIGONE OF BLADDER: ICD-10-CM

## 2023-12-18 DIAGNOSIS — Z98.890 OTHER SPECIFIED POSTPROCEDURAL STATES: Chronic | ICD-10-CM

## 2023-12-18 DIAGNOSIS — N13.30 UNSPECIFIED HYDRONEPHROSIS: ICD-10-CM

## 2023-12-18 PROCEDURE — 74178 CT ABD&PLV WO CNTR FLWD CNTR: CPT

## 2023-12-18 PROCEDURE — 74178 CT ABD&PLV WO CNTR FLWD CNTR: CPT | Mod: 26

## 2023-12-20 LAB
APPEARANCE: CLEAR
BACTERIA UR CULT: NORMAL
BACTERIA: NEGATIVE /HPF
BILIRUBIN URINE: NEGATIVE
BLOOD URINE: ABNORMAL
CAST: 1 /LPF
COLOR: YELLOW
EPITHELIAL CELLS: 0 /HPF
GLUCOSE QUALITATIVE U: NEGATIVE MG/DL
KETONES URINE: NEGATIVE MG/DL
LEUKOCYTE ESTERASE URINE: NEGATIVE
MICROSCOPIC-UA: NORMAL
NITRITE URINE: NEGATIVE
PH URINE: 6.5
PROTEIN URINE: NEGATIVE MG/DL
RED BLOOD CELLS URINE: 48 /HPF
SPECIFIC GRAVITY URINE: 1.02
UROBILINOGEN URINE: 0.2 MG/DL
WHITE BLOOD CELLS URINE: 1 /HPF

## 2024-01-08 ENCOUNTER — APPOINTMENT (OUTPATIENT)
Dept: UROLOGY | Facility: CLINIC | Age: 64
End: 2024-01-08
Payer: COMMERCIAL

## 2024-01-08 ENCOUNTER — APPOINTMENT (OUTPATIENT)
Dept: UROLOGY | Facility: CLINIC | Age: 64
End: 2024-01-08

## 2024-01-08 ENCOUNTER — OUTPATIENT (OUTPATIENT)
Dept: OUTPATIENT SERVICES | Facility: HOSPITAL | Age: 64
LOS: 1 days | End: 2024-01-08
Payer: COMMERCIAL

## 2024-01-08 VITALS
OXYGEN SATURATION: 94 % | RESPIRATION RATE: 16 BRPM | SYSTOLIC BLOOD PRESSURE: 190 MMHG | HEART RATE: 78 BPM | DIASTOLIC BLOOD PRESSURE: 92 MMHG

## 2024-01-08 VITALS — SYSTOLIC BLOOD PRESSURE: 160 MMHG | DIASTOLIC BLOOD PRESSURE: 90 MMHG

## 2024-01-08 DIAGNOSIS — Z98.890 OTHER SPECIFIED POSTPROCEDURAL STATES: Chronic | ICD-10-CM

## 2024-01-08 DIAGNOSIS — R35.0 FREQUENCY OF MICTURITION: ICD-10-CM

## 2024-01-08 PROCEDURE — 99213 OFFICE O/P EST LOW 20 MIN: CPT | Mod: 25

## 2024-01-08 PROCEDURE — 51720 TREATMENT OF BLADDER LESION: CPT

## 2024-01-08 RX ORDER — BACILLUS CALMETTE-GUERIN 50 MG/50ML
50 POWDER, FOR SUSPENSION INTRAVESICAL
Qty: 1 | Refills: 0 | Status: COMPLETED | OUTPATIENT
Start: 2024-01-08 | End: 2024-01-08

## 2024-01-08 RX ORDER — BACILLUS CALMETTE-GUERIN 50 MG/50ML
50 POWDER, FOR SUSPENSION INTRAVESICAL
Refills: 0 | Status: COMPLETED | OUTPATIENT
Start: 2024-01-08

## 2024-01-08 RX ADMIN — BACILLUS CALMETTE-GUERIN 0 MG: 50 POWDER, FOR SUSPENSION INTRAVESICAL at 00:00

## 2024-01-08 NOTE — ASSESSMENT
[FreeTextEntry1] : HG T1 TCC s/p TURBT x2. Induction BCG to begin today. Discussed r/b/a.  --BCG today  L hydro to level of bladder. Unable to visualize UO at time of OR --refer to IR for antegrade PCN/stent placement

## 2024-01-08 NOTE — PHYSICAL EXAM
[General Appearance - Well Developed] : well developed [General Appearance - Well Nourished] : well nourished [General Appearance - In No Acute Distress] : no acute distress [Edema] : no peripheral edema [Exaggerated Use Of Accessory Muscles For Inspiration] : no accessory muscle use [Abdomen Soft] : soft [Abdomen Tenderness] : non-tender [Costovertebral Angle Tenderness] : no ~M costovertebral angle tenderness [Normal Station and Gait] : the gait and station were normal for the patient's age [No Focal Deficits] : no focal deficits [Oriented To Time, Place, And Person] : oriented to person, place, and time [Urethral Meatus] : meatus normal [Penis Abnormality] : normal circumcised penis

## 2024-01-09 ENCOUNTER — APPOINTMENT (OUTPATIENT)
Dept: INTERVENTIONAL RADIOLOGY/VASCULAR | Facility: CLINIC | Age: 64
End: 2024-01-09

## 2024-01-09 DIAGNOSIS — F17.200 NICOTINE DEPENDENCE, UNSPECIFIED, UNCOMPLICATED: ICD-10-CM

## 2024-01-09 DIAGNOSIS — I10 ESSENTIAL (PRIMARY) HYPERTENSION: ICD-10-CM

## 2024-01-09 DIAGNOSIS — Z78.9 OTHER SPECIFIED HEALTH STATUS: ICD-10-CM

## 2024-01-09 DIAGNOSIS — E11.9 TYPE 2 DIABETES MELLITUS W/OUT COMPLICATIONS: ICD-10-CM

## 2024-01-09 PROCEDURE — XXXXX: CPT | Mod: 1L

## 2024-01-09 NOTE — PHYSICAL EXAM
[Alert] : alert [No Acute Distress] : no acute distress [Normal Voice/Communication] : normal voice communication [No Respiratory Distress] : no respiratory distress [Oriented x3] : oriented to person, place, and time [Normal Insight/Judgement] : insight and judgment were intact [Normal Mood] : the mood was normal [Recent Memory Normal] : recent memory was not impaired [Remote Memory Normal] : remote memory was not impaired [Restricted in physically strenuous activity but ambulatory and able to carry out work of a light or sedentary nature] : Restricted in physically strenuous activity but ambulatory and able to carry out work of a light or sedentary nature, e.g., light house work, office work

## 2024-01-11 DIAGNOSIS — C67.0 MALIGNANT NEOPLASM OF TRIGONE OF BLADDER: ICD-10-CM

## 2024-01-11 DIAGNOSIS — N13.30 UNSPECIFIED HYDRONEPHROSIS: ICD-10-CM

## 2024-01-12 ENCOUNTER — OUTPATIENT (OUTPATIENT)
Dept: OUTPATIENT SERVICES | Facility: HOSPITAL | Age: 64
LOS: 1 days | End: 2024-01-12
Payer: COMMERCIAL

## 2024-01-12 ENCOUNTER — APPOINTMENT (OUTPATIENT)
Dept: UROLOGY | Facility: CLINIC | Age: 64
End: 2024-01-12
Payer: COMMERCIAL

## 2024-01-12 VITALS — DIASTOLIC BLOOD PRESSURE: 92 MMHG | SYSTOLIC BLOOD PRESSURE: 160 MMHG

## 2024-01-12 VITALS — SYSTOLIC BLOOD PRESSURE: 174 MMHG | DIASTOLIC BLOOD PRESSURE: 106 MMHG | HEART RATE: 63 BPM

## 2024-01-12 VITALS — DIASTOLIC BLOOD PRESSURE: 98 MMHG | SYSTOLIC BLOOD PRESSURE: 196 MMHG | HEART RATE: 84 BPM

## 2024-01-12 DIAGNOSIS — R35.0 FREQUENCY OF MICTURITION: ICD-10-CM

## 2024-01-12 DIAGNOSIS — Z98.890 OTHER SPECIFIED POSTPROCEDURAL STATES: Chronic | ICD-10-CM

## 2024-01-12 PROCEDURE — 51720 TREATMENT OF BLADDER LESION: CPT

## 2024-01-12 RX ORDER — BACILLUS CALMETTE-GUERIN 50 MG/50ML
50 POWDER, FOR SUSPENSION INTRAVESICAL
Refills: 0 | Status: COMPLETED | OUTPATIENT
Start: 2024-01-12

## 2024-01-12 RX ORDER — BACILLUS CALMETTE-GUERIN 50 MG/50ML
50 POWDER, FOR SUSPENSION INTRAVESICAL
Qty: 1 | Refills: 0 | Status: COMPLETED | OUTPATIENT
Start: 2024-01-12 | End: 2024-01-12

## 2024-01-12 RX ADMIN — BACILLUS CALMETTE-GUERIN 0 MG: 50 POWDER, FOR SUSPENSION INTRAVESICAL at 00:00

## 2024-01-12 RX ADMIN — BACILLUS CALMETTE-GUERIN MG: 50 POWDER, FOR SUSPENSION INTRAVESICAL at 00:00

## 2024-01-18 DIAGNOSIS — C67.0 MALIGNANT NEOPLASM OF TRIGONE OF BLADDER: ICD-10-CM

## 2024-01-22 ENCOUNTER — APPOINTMENT (OUTPATIENT)
Dept: UROLOGY | Facility: CLINIC | Age: 64
End: 2024-01-22
Payer: COMMERCIAL

## 2024-01-22 ENCOUNTER — OUTPATIENT (OUTPATIENT)
Dept: OUTPATIENT SERVICES | Facility: HOSPITAL | Age: 64
LOS: 1 days | End: 2024-01-22
Payer: COMMERCIAL

## 2024-01-22 VITALS — SYSTOLIC BLOOD PRESSURE: 142 MMHG | HEART RATE: 68 BPM | DIASTOLIC BLOOD PRESSURE: 96 MMHG

## 2024-01-22 VITALS
TEMPERATURE: 98 F | DIASTOLIC BLOOD PRESSURE: 90 MMHG | HEIGHT: 69 IN | SYSTOLIC BLOOD PRESSURE: 158 MMHG | OXYGEN SATURATION: 97 % | HEART RATE: 78 BPM | WEIGHT: 229.94 LBS | RESPIRATION RATE: 16 BRPM

## 2024-01-22 DIAGNOSIS — Z01.818 ENCOUNTER FOR OTHER PREPROCEDURAL EXAMINATION: ICD-10-CM

## 2024-01-22 DIAGNOSIS — I10 ESSENTIAL (PRIMARY) HYPERTENSION: ICD-10-CM

## 2024-01-22 DIAGNOSIS — R35.0 FREQUENCY OF MICTURITION: ICD-10-CM

## 2024-01-22 DIAGNOSIS — Z98.890 OTHER SPECIFIED POSTPROCEDURAL STATES: Chronic | ICD-10-CM

## 2024-01-22 DIAGNOSIS — C67.0 MALIGNANT NEOPLASM OF TRIGONE OF BLADDER: ICD-10-CM

## 2024-01-22 DIAGNOSIS — N13.30 UNSPECIFIED HYDRONEPHROSIS: ICD-10-CM

## 2024-01-22 DIAGNOSIS — G47.33 OBSTRUCTIVE SLEEP APNEA (ADULT) (PEDIATRIC): ICD-10-CM

## 2024-01-22 LAB
A1C WITH ESTIMATED AVERAGE GLUCOSE RESULT: 6.3 % — HIGH (ref 4–5.6)
ALBUMIN SERPL ELPH-MCNC: 4.4 G/DL — SIGNIFICANT CHANGE UP (ref 3.3–5)
ALP SERPL-CCNC: 83 U/L — SIGNIFICANT CHANGE UP (ref 40–120)
ALT FLD-CCNC: 25 U/L — SIGNIFICANT CHANGE UP (ref 10–45)
ANION GAP SERPL CALC-SCNC: 13 MMOL/L — SIGNIFICANT CHANGE UP (ref 5–17)
AST SERPL-CCNC: 26 U/L — SIGNIFICANT CHANGE UP (ref 10–40)
BILIRUB SERPL-MCNC: 0.4 MG/DL — SIGNIFICANT CHANGE UP (ref 0.2–1.2)
BLD GP AB SCN SERPL QL: POSITIVE — SIGNIFICANT CHANGE UP
BUN SERPL-MCNC: 20 MG/DL — SIGNIFICANT CHANGE UP (ref 7–23)
CALCIUM SERPL-MCNC: 9.7 MG/DL — SIGNIFICANT CHANGE UP (ref 8.4–10.5)
CHLORIDE SERPL-SCNC: 102 MMOL/L — SIGNIFICANT CHANGE UP (ref 96–108)
CO2 SERPL-SCNC: 26 MMOL/L — SIGNIFICANT CHANGE UP (ref 22–31)
CREAT SERPL-MCNC: 1.37 MG/DL — HIGH (ref 0.5–1.3)
EGFR: 58 ML/MIN/1.73M2 — LOW
ESTIMATED AVERAGE GLUCOSE: 134 MG/DL — HIGH (ref 68–114)
GLUCOSE SERPL-MCNC: 139 MG/DL — HIGH (ref 70–99)
HCT VFR BLD CALC: 44.2 % — SIGNIFICANT CHANGE UP (ref 39–50)
HGB BLD-MCNC: 13.9 G/DL — SIGNIFICANT CHANGE UP (ref 13–17)
MCHC RBC-ENTMCNC: 27.9 PG — SIGNIFICANT CHANGE UP (ref 27–34)
MCHC RBC-ENTMCNC: 31.4 GM/DL — LOW (ref 32–36)
MCV RBC AUTO: 88.6 FL — SIGNIFICANT CHANGE UP (ref 80–100)
NRBC # BLD: 0 /100 WBCS — SIGNIFICANT CHANGE UP (ref 0–0)
PLATELET # BLD AUTO: 138 K/UL — LOW (ref 150–400)
POTASSIUM SERPL-MCNC: 4 MMOL/L — SIGNIFICANT CHANGE UP (ref 3.5–5.3)
POTASSIUM SERPL-SCNC: 4 MMOL/L — SIGNIFICANT CHANGE UP (ref 3.5–5.3)
PROT SERPL-MCNC: 8.2 G/DL — SIGNIFICANT CHANGE UP (ref 6–8.3)
RBC # BLD: 4.99 M/UL — SIGNIFICANT CHANGE UP (ref 4.2–5.8)
RBC # FLD: 17.3 % — HIGH (ref 10.3–14.5)
RH IG SCN BLD-IMP: POSITIVE — SIGNIFICANT CHANGE UP
SODIUM SERPL-SCNC: 141 MMOL/L — SIGNIFICANT CHANGE UP (ref 135–145)
WBC # BLD: 7.94 K/UL — SIGNIFICANT CHANGE UP (ref 3.8–10.5)
WBC # FLD AUTO: 7.94 K/UL — SIGNIFICANT CHANGE UP (ref 3.8–10.5)

## 2024-01-22 PROCEDURE — 86880 COOMBS TEST DIRECT: CPT

## 2024-01-22 PROCEDURE — 86900 BLOOD TYPING SEROLOGIC ABO: CPT

## 2024-01-22 PROCEDURE — 51720 TREATMENT OF BLADDER LESION: CPT

## 2024-01-22 PROCEDURE — G0463: CPT

## 2024-01-22 PROCEDURE — 86870 RBC ANTIBODY IDENTIFICATION: CPT

## 2024-01-22 PROCEDURE — 86850 RBC ANTIBODY SCREEN: CPT

## 2024-01-22 PROCEDURE — 86077 PHYS BLOOD BANK SERV XMATCH: CPT

## 2024-01-22 PROCEDURE — 86901 BLOOD TYPING SEROLOGIC RH(D): CPT

## 2024-01-22 RX ORDER — BACILLUS CALMETTE-GUERIN 50 MG/50ML
50 POWDER, FOR SUSPENSION INTRAVESICAL
Refills: 0 | Status: COMPLETED | OUTPATIENT
Start: 2024-01-22

## 2024-01-22 RX ADMIN — BACILLUS CALMETTE-GUERIN 0 MG: 50 POWDER, FOR SUSPENSION INTRAVESICAL at 00:00

## 2024-01-22 NOTE — H&P PST ADULT - NEGATIVE CARDIOVASCULAR SYMPTOMS
no chest pain/no palpitations/no dyspnea on exertion/no peripheral edema no chest pain/no palpitations/no dyspnea on exertion/no orthopnea

## 2024-01-22 NOTE — H&P PST ADULT - NEGATIVE ENMT SYMPTOMS
no throat pain/no dysphagia no hearing difficulty/no sinus symptoms/no nasal congestion/no throat pain/no dysphagia

## 2024-01-22 NOTE — H&P PST ADULT - ASSESSMENT
Activity: Walks for exercise couple of times a week, has 2 flights of stairs at home, all house chores    DASI scale:    Mallampati:    Dentition: slightly loose teeth on the lower front, no dentures Activity: Walks for exercise couple of times a week, has 2 flights of stairs at home, all house chores    DASI scale: 5.07    Mallampati: 2    Dentition: slightly loose teeth on the lower front, no dentures

## 2024-01-22 NOTE — H&P PST ADULT - HISTORY OF PRESENT ILLNESS
64 yo male  Mr. Bocanegra is a 63 y.o male with a PMH of DMT2, HTN, bladder cancer s/p resection now on BCG treatement, now with left hydronephrosis who presents to IR for consultation for left percutaneous nephrostomy with antegrade stent.  ?  Pt was admitted to hospital 10/28 with gross hematuria. He had minimal prior medical care. Pt had fisher placed and CBI was started. He was noted to be hyperglycemic (hgb a1c=11) and anemic. On CT noted to have clot vs mass in bladder. Had L hydro with small probable distal ureteral stone. Pt failed to wean from CBI and was taken to the OR where large clot was noted as well as large bladder tumor along trigone and L bladder floor obscuring L UO.  ?  He is currently receiving BCG treatment. He had his first treatment 1/8 and has his second scheduled on 1/12. He was previously taken to OR x 2 but unable to find ureteral obstruction.  ?  He is now referred to IR for left percutaneous nephrostomy with antegrade stent by Dr. Rahman.  ?  Today he reports feeling well. No longer has fisher catheter in. Denies gross hematuria but reports some spotting after BCG treatment 1/8. Denies dysuria, frequency, flank pain.  ?  He reports he was taken off of his insulin as his glucose is now under control and currently only takes Janumet.  ?  He currently lives with his brother who is bedbound but has his nephew nearby who has been helping with his care during/after procedure.  ?  PCP: Shayy Lin [phone]  Uro: Lacey  Endo: Essence Dominguez) [phone] 62 yo male, PMH HTN, T2DM, bladder CA s/p resection and presently BCG treament, left hydronephrosis - pt. was admitted to hospital 10/28 with hematuria, Fisher placed for CBI, HgA1c was 11 and H&H 6.9/22.1 - received 2 uPRBC 10/30/23     no pain  A/P  Hematuria, w clots on admission,      10/30 cystoscopy /TURBT report: large left later bladder wall tumor excised, clot burden removed. Path pending.       voiding well after fisher removal. endo recs appreciated for treatment of new onset DM. will need endo and  f/u, also new onset anemia and HTN, will need PCP referral and HEME F/U    1. Hydronephrosis of left kidney  - pt admitted with gross hematuria, on workup was found to have large clot as well as large bladder tumor along trigone and L bladder floor obscuring L UO now s/p resection of bladder tumor but still with left hydronephrosis  - previously taken to OR x 2 but unable to locate ureteral obstruction  - referred to IR by Dr. Rahman for left percutaneous nephrostomy with antegrade stent  - he reports feeling well- denies dysuria, frequency, flank pain- had some spotting after BCG treatment yesterday  - 12/18/23 CT abdomen demonstrates:  > Persistent left hydroureteronephrosis with a persistent distal left ureteral calculus as well as thickening of the posterior left bladder wall surrounding the left ureteral vesicular junction, correlate for known transitional cell carcinoma  > Layering likely debris within the mid to distal left ureter without enhancing mass discretely identified on current study.  > Subcentimeter retroperitoneal and pelvic lymph nodes.  > Cirrhotic liver with additional findings of portal hypertension  - imaging reviewed and discussed with pt  - recommending left percutaneous nephrostomy with antegrade stent- discussed will perform capping trial prior to removal of nephrostomy  - I reviewed the procedure, including the risks, benefits, alternatives, and expected post procedure course.  - would like to hold baby aspirin x 5 days-pt unsure of why he takes it- will discuss with pcp Dr. Nanette Reed if feasible to hold x 5 days preop- left vm with Dr. Reed on 1/9 awaiting call back  -will require PSTs  - pt lives with brother who is bedbound but has nephew who has been helping with procedures and care after-discussed he will need nephew to drive him home and stay with him overnight  - will make arrangementsMrRuth Bocanegra is a 63 y.o male with a PMH of DMT2, HTN, bladder cancer s/p resection now on BCG treatement, now with left hydronephrosis who presents to IR for consultation for left percutaneous nephrostomy with antegrade stent.  ?  Pt was admitted to hospital 10/28 with gross hematuria. He had minimal prior medical care. Pt had fisher placed and CBI was started. He was noted to be hyperglycemic (hgb a1c=11) and anemic. On CT noted to have clot vs mass in bladder. Had L hydro with small probable distal ureteral stone. Pt failed to wean from CBI and was taken to the OR where large clot was noted as well as large bladder tumor along trigone and L bladder floor obscuring L UO.  ?  He is currently receiving BCG treatment. He had his first treatment 1/8 and has his second scheduled on 1/12. He was previously taken to OR x 2 but unable to find ureteral obstruction.  ?  He is now referred to IR for left percutaneous nephrostomy with antegrade stent by Dr. Rahman.  ?  Today he reports feeling well. No longer has fisher catheter in. Denies gross hematuria but reports some spotting after BCG treatment 1/8. Denies dysuria, frequency, flank pain.  ?  He reports he was taken off of his insulin as his glucose is now under control and currently only takes Janumet.  ?  He currently lives with his brother who is bedbound but has his nephew nearby who has been helping with his care during/after procedure.  ?  PCP: Shayy Lin [phone]  Uro: Lacey  Endo: Essence Dominguez) [phone] 62 yo male, PMH HTN, T2DM, pt. came to ER on 10/28 with gross hematuria, HgA1c 11, H&H 6.9/22.1 (had not been going to the doctor for a while), received 2 u PRBC, Left hydronephroses, s/p Cystoscopy/TURBT - large bladder tumor and clot which were excised (10/31)- biopsy positive for CA - pt. presently receiving BCG treatments, persistent left hydronephrosis  - s/p repeat Cystoscopy/TURBT, right retrograde pyelogram, left ureteral orifice not seen and left pyelogram not done (11/28/23) -  unable to locate ureteral obstruction. Pt. presents to PST for scheduled Left Percutaneous Nephrostomy with antegrade stent placement on 1/31/24. Last HgA1c 10/8, pt. states lately his FBS have been in the 100's before breakfast after he was placed on oral hypoglycemic. Pt. is missing teeth, two left lower front teeth slightly loose (as noted previously by anesthesia). Denies recent fever, chills, chest pain, SOB, changes in bowel/urinary habits or recent exposure to COVID-19 infection.   64 yo male, PMH HTN, T2DM, pt. came to ER on 10/28/23 with gross hematuria, HgA1c 11, H&H 6.9/22.1 (had not been going to the doctor for a while), received 2 u PRBC, Left hydronephroses, s/p Cystoscopy/TURBT - large bladder tumor and clot which were excised (10/31/23)- biopsy positive for CA - pt. presently receiving BCG treatments, persistent left hydronephrosis  - s/p repeat Cystoscopy/TURBT, right retrograde pyelogram, left ureteral orifice not seen and left pyelogram not done (11/28/23) -  unable to locate ureteral obstruction. Pt. presents to PST for scheduled Left Percutaneous Nephrostomy with antegrade stent placement on 1/31/24. Last HgA1c 10/8, pt. states lately his FBS have been in the 100's before breakfast after he was placed on oral hypoglycemic. Pt. is missing teeth, two left lower front teeth slightly loose (as noted previously by anesthesia). Denies recent fever, chills, chest pain, SOB, changes in bowel/urinary habits or recent exposure to COVID-19 infection.   62 yo male, PMH HTN, T2DM, pt. came to ER on 10/28/23 with gross hematuria, HgA1c 11, H&H 6.9/22.1 (had not been going to the doctor for a while), received 2 u PRBC, Left hydronephroses, s/p Cystoscopy/TURBT - large bladder tumor and clot which were excised (10/31/23)- biopsy positive for CA - pt. presently receiving BCG treatments, persistent left hydronephrosis  - s/p repeat Cystoscopy/TURBT, right retrograde pyelogram, left ureteral orifice not seen and left pyelogram not done (11/28/23) -  unable to locate ureteral obstruction. Pt. presents to PST for scheduled Left Percutaneous Nephrostomy with antegrade stent placement on 1/31/24. Last HgA1c 10/8, pt. states lately his FBS have been in the 100's before breakfast after he was placed on oral hypoglycemic. Pt. is missing teeth, two left lower front teeth slightly loose (as noted previously by anesthesia). Denies recent fever, chills, chest pain, SOB, changes in bowel/urinary habits or recent exposure to COVID-19 infection.

## 2024-01-22 NOTE — H&P PST ADULT - NSICDXPASTMEDICALHX_GEN_ALL_CORE_FT
PAST MEDICAL HISTORY:  Bladder tumor     DM2 (diabetes mellitus, type 2)     HTN (hypertension)      PAST MEDICAL HISTORY:  Bladder tumor     DM2 (diabetes mellitus, type 2)     History of umbilical hernia     HTN (hypertension)     Obese

## 2024-01-22 NOTE — H&P PST ADULT - NSICDXPASTSURGICALHX_GEN_ALL_CORE_FT
PAST SURGICAL HISTORY:  H/O transurethral resection of bladder tumor (TURBT)      PAST SURGICAL HISTORY:  H/O cystoscopy     H/O transurethral resection of bladder tumor (TURBT)

## 2024-01-22 NOTE — H&P PST ADULT - PROBLEM SELECTOR PLAN 1
Left Percutaneous Nephrostomy with antegrade stent placement on 1/31/24  Pre-op instructions, including Chlorhexidine soap, provided - all questions answered    Labs done at PST    Pt. will hold ASA 5 days pre-op, last dose 1/25, as per surgery

## 2024-01-22 NOTE — H&P PST ADULT - PRIMARY CARE PROVIDER
Dr. Shayy Fitzpatrick Derek  Topical Steroids Applications Pregnancy And Lactation Text: Most topical steroids are considered safe to use during pregnancy and lactation.  Any topical steroid applied to the breast or nipple should be washed off before breastfeeding.

## 2024-01-23 LAB
CULTURE RESULTS: NO GROWTH — SIGNIFICANT CHANGE UP
SPECIMEN SOURCE: SIGNIFICANT CHANGE UP

## 2024-01-24 PROBLEM — E66.9 OBESITY, UNSPECIFIED: Chronic | Status: ACTIVE | Noted: 2024-01-22

## 2024-01-24 PROBLEM — Z87.19 PERSONAL HISTORY OF OTHER DISEASES OF THE DIGESTIVE SYSTEM: Chronic | Status: ACTIVE | Noted: 2024-01-22

## 2024-01-29 ENCOUNTER — OUTPATIENT (OUTPATIENT)
Dept: OUTPATIENT SERVICES | Facility: HOSPITAL | Age: 64
LOS: 1 days | End: 2024-01-29
Payer: COMMERCIAL

## 2024-01-29 ENCOUNTER — APPOINTMENT (OUTPATIENT)
Dept: UROLOGY | Facility: CLINIC | Age: 64
End: 2024-01-29
Payer: COMMERCIAL

## 2024-01-29 VITALS
RESPIRATION RATE: 16 BRPM | DIASTOLIC BLOOD PRESSURE: 100 MMHG | SYSTOLIC BLOOD PRESSURE: 160 MMHG | OXYGEN SATURATION: 98 % | HEART RATE: 58 BPM

## 2024-01-29 DIAGNOSIS — Z98.890 OTHER SPECIFIED POSTPROCEDURAL STATES: Chronic | ICD-10-CM

## 2024-01-29 DIAGNOSIS — R35.0 FREQUENCY OF MICTURITION: ICD-10-CM

## 2024-01-29 PROCEDURE — 51720 TREATMENT OF BLADDER LESION: CPT

## 2024-01-29 RX ORDER — BACILLUS CALMETTE-GUERIN 50 MG/50ML
50 POWDER, FOR SUSPENSION INTRAVESICAL
Qty: 1 | Refills: 0 | Status: COMPLETED | OUTPATIENT
Start: 2024-01-22 | End: 2024-01-22

## 2024-01-29 RX ORDER — BACILLUS CALMETTE-GUERIN 50 MG/50ML
50 POWDER, FOR SUSPENSION INTRAVESICAL
Refills: 0 | Status: COMPLETED | OUTPATIENT
Start: 2024-01-29

## 2024-01-29 RX ORDER — BACILLUS CALMETTE-GUERIN 50 MG/50ML
50 POWDER, FOR SUSPENSION INTRAVESICAL
Qty: 1 | Refills: 0 | Status: COMPLETED | OUTPATIENT
Start: 2024-01-29 | End: 2024-01-29

## 2024-01-29 RX ADMIN — BACILLUS CALMETTE-GUERIN 0 MG: 50 POWDER, FOR SUSPENSION INTRAVESICAL at 00:00

## 2024-01-30 DIAGNOSIS — C67.0 MALIGNANT NEOPLASM OF TRIGONE OF BLADDER: ICD-10-CM

## 2024-01-31 ENCOUNTER — INPATIENT (INPATIENT)
Facility: HOSPITAL | Age: 64
LOS: 0 days | Discharge: HOME HEALTH | DRG: 988 | End: 2024-02-01
Attending: INTERNAL MEDICINE | Admitting: STUDENT IN AN ORGANIZED HEALTH CARE EDUCATION/TRAINING PROGRAM
Payer: COMMERCIAL

## 2024-01-31 VITALS
HEART RATE: 67 BPM | RESPIRATION RATE: 13 BRPM | OXYGEN SATURATION: 99 % | SYSTOLIC BLOOD PRESSURE: 163 MMHG | TEMPERATURE: 97 F | DIASTOLIC BLOOD PRESSURE: 104 MMHG

## 2024-01-31 DIAGNOSIS — Z98.890 OTHER SPECIFIED POSTPROCEDURAL STATES: Chronic | ICD-10-CM

## 2024-01-31 DIAGNOSIS — N13.0 HYDRONEPHROSIS WITH URETEROPELVIC JUNCTION OBSTRUCTION: ICD-10-CM

## 2024-01-31 LAB
ALBUMIN SERPL ELPH-MCNC: 4.1 G/DL — SIGNIFICANT CHANGE UP (ref 3.3–5)
ALP SERPL-CCNC: 86 U/L — SIGNIFICANT CHANGE UP (ref 40–120)
ALT FLD-CCNC: 26 U/L — SIGNIFICANT CHANGE UP (ref 10–45)
ANION GAP SERPL CALC-SCNC: 14 MMOL/L — SIGNIFICANT CHANGE UP (ref 5–17)
AST SERPL-CCNC: 31 U/L — SIGNIFICANT CHANGE UP (ref 10–40)
BILIRUB SERPL-MCNC: 0.4 MG/DL — SIGNIFICANT CHANGE UP (ref 0.2–1.2)
BUN SERPL-MCNC: 14 MG/DL — SIGNIFICANT CHANGE UP (ref 7–23)
CALCIUM SERPL-MCNC: 9 MG/DL — SIGNIFICANT CHANGE UP (ref 8.4–10.5)
CHLORIDE SERPL-SCNC: 102 MMOL/L — SIGNIFICANT CHANGE UP (ref 96–108)
CO2 SERPL-SCNC: 25 MMOL/L — SIGNIFICANT CHANGE UP (ref 22–31)
CREAT SERPL-MCNC: 1.26 MG/DL — SIGNIFICANT CHANGE UP (ref 0.5–1.3)
EGFR: 64 ML/MIN/1.73M2 — SIGNIFICANT CHANGE UP
GAS PNL BLDA: SIGNIFICANT CHANGE UP
GAS PNL BLDA: SIGNIFICANT CHANGE UP
GLUCOSE BLDC GLUCOMTR-MCNC: 134 MG/DL — HIGH (ref 70–99)
GLUCOSE BLDC GLUCOMTR-MCNC: 155 MG/DL — HIGH (ref 70–99)
GLUCOSE BLDC GLUCOMTR-MCNC: 181 MG/DL — HIGH (ref 70–99)
GLUCOSE BLDC GLUCOMTR-MCNC: 195 MG/DL — HIGH (ref 70–99)
GLUCOSE SERPL-MCNC: 158 MG/DL — HIGH (ref 70–99)
HCT VFR BLD CALC: 44.6 % — SIGNIFICANT CHANGE UP (ref 39–50)
HGB BLD-MCNC: 14.2 G/DL — SIGNIFICANT CHANGE UP (ref 13–17)
MCHC RBC-ENTMCNC: 28.3 PG — SIGNIFICANT CHANGE UP (ref 27–34)
MCHC RBC-ENTMCNC: 31.8 GM/DL — LOW (ref 32–36)
MCV RBC AUTO: 89 FL — SIGNIFICANT CHANGE UP (ref 80–100)
NRBC # BLD: 0 /100 WBCS — SIGNIFICANT CHANGE UP (ref 0–0)
NT-PROBNP SERPL-SCNC: 286 PG/ML — SIGNIFICANT CHANGE UP (ref 0–300)
PLATELET # BLD AUTO: 156 K/UL — SIGNIFICANT CHANGE UP (ref 150–400)
POTASSIUM SERPL-MCNC: 4.3 MMOL/L — SIGNIFICANT CHANGE UP (ref 3.5–5.3)
POTASSIUM SERPL-SCNC: 4.3 MMOL/L — SIGNIFICANT CHANGE UP (ref 3.5–5.3)
PROT SERPL-MCNC: 7.9 G/DL — SIGNIFICANT CHANGE UP (ref 6–8.3)
RBC # BLD: 5.01 M/UL — SIGNIFICANT CHANGE UP (ref 4.2–5.8)
RBC # FLD: 18.2 % — HIGH (ref 10.3–14.5)
SODIUM SERPL-SCNC: 141 MMOL/L — SIGNIFICANT CHANGE UP (ref 135–145)
WBC # BLD: 14.58 K/UL — HIGH (ref 3.8–10.5)
WBC # FLD AUTO: 14.58 K/UL — HIGH (ref 3.8–10.5)

## 2024-01-31 PROCEDURE — 93010 ELECTROCARDIOGRAM REPORT: CPT

## 2024-01-31 PROCEDURE — 71045 X-RAY EXAM CHEST 1 VIEW: CPT | Mod: 26

## 2024-01-31 PROCEDURE — 50695 PLMT URETERAL STENT PRQ: CPT | Mod: LT

## 2024-01-31 RX ORDER — HYDRALAZINE HCL 50 MG
10 TABLET ORAL EVERY 6 HOURS
Refills: 0 | Status: DISCONTINUED | OUTPATIENT
Start: 2024-01-31 | End: 2024-02-01

## 2024-01-31 RX ORDER — ACETAMINOPHEN 500 MG
650 TABLET ORAL EVERY 6 HOURS
Refills: 0 | Status: DISCONTINUED | OUTPATIENT
Start: 2024-01-31 | End: 2024-02-01

## 2024-01-31 RX ORDER — FOLIC ACID 0.8 MG
1 TABLET ORAL DAILY
Refills: 0 | Status: DISCONTINUED | OUTPATIENT
Start: 2024-01-31 | End: 2024-02-01

## 2024-01-31 RX ORDER — DEXTROSE 50 % IN WATER 50 %
15 SYRINGE (ML) INTRAVENOUS ONCE
Refills: 0 | Status: DISCONTINUED | OUTPATIENT
Start: 2024-01-31 | End: 2024-02-01

## 2024-01-31 RX ORDER — SODIUM CHLORIDE 9 MG/ML
1000 INJECTION, SOLUTION INTRAVENOUS
Refills: 0 | Status: DISCONTINUED | OUTPATIENT
Start: 2024-01-31 | End: 2024-02-01

## 2024-01-31 RX ORDER — PREGABALIN 225 MG/1
1000 CAPSULE ORAL DAILY
Refills: 0 | Status: DISCONTINUED | OUTPATIENT
Start: 2024-01-31 | End: 2024-02-01

## 2024-01-31 RX ORDER — INSULIN LISPRO 100/ML
VIAL (ML) SUBCUTANEOUS
Refills: 0 | Status: DISCONTINUED | OUTPATIENT
Start: 2024-01-31 | End: 2024-02-01

## 2024-01-31 RX ORDER — DEXTROSE 50 % IN WATER 50 %
25 SYRINGE (ML) INTRAVENOUS ONCE
Refills: 0 | Status: DISCONTINUED | OUTPATIENT
Start: 2024-01-31 | End: 2024-02-01

## 2024-01-31 RX ORDER — LOSARTAN POTASSIUM 100 MG/1
25 TABLET, FILM COATED ORAL DAILY
Refills: 0 | Status: DISCONTINUED | OUTPATIENT
Start: 2024-01-31 | End: 2024-02-01

## 2024-01-31 RX ORDER — DEXTROSE 50 % IN WATER 50 %
12.5 SYRINGE (ML) INTRAVENOUS ONCE
Refills: 0 | Status: DISCONTINUED | OUTPATIENT
Start: 2024-01-31 | End: 2024-02-01

## 2024-01-31 RX ORDER — IPRATROPIUM/ALBUTEROL SULFATE 18-103MCG
3 AEROSOL WITH ADAPTER (GRAM) INHALATION ONCE
Refills: 0 | Status: COMPLETED | OUTPATIENT
Start: 2024-01-31 | End: 2024-01-31

## 2024-01-31 RX ORDER — TAMSULOSIN HYDROCHLORIDE 0.4 MG/1
0.4 CAPSULE ORAL AT BEDTIME
Refills: 0 | Status: DISCONTINUED | OUTPATIENT
Start: 2024-01-31 | End: 2024-02-01

## 2024-01-31 RX ORDER — GLUCAGON INJECTION, SOLUTION 0.5 MG/.1ML
1 INJECTION, SOLUTION SUBCUTANEOUS ONCE
Refills: 0 | Status: DISCONTINUED | OUTPATIENT
Start: 2024-01-31 | End: 2024-02-01

## 2024-01-31 RX ORDER — OXYBUTYNIN CHLORIDE 5 MG
5 TABLET ORAL THREE TIMES A DAY
Refills: 0 | Status: DISCONTINUED | OUTPATIENT
Start: 2024-01-31 | End: 2024-02-01

## 2024-01-31 RX ADMIN — Medication 1: at 18:36

## 2024-01-31 RX ADMIN — TAMSULOSIN HYDROCHLORIDE 0.4 MILLIGRAM(S): 0.4 CAPSULE ORAL at 22:28

## 2024-01-31 RX ADMIN — Medication 3 MILLILITER(S): at 22:28

## 2024-01-31 RX ADMIN — SODIUM CHLORIDE 75 MILLILITER(S): 9 INJECTION, SOLUTION INTRAVENOUS at 22:25

## 2024-01-31 RX ADMIN — Medication 5 MILLIGRAM(S): at 22:28

## 2024-01-31 NOTE — ADDENDUM
[FreeTextEntry1] : 1/10/24 11:04am Spoke with Dr. Reed regarding upcoming plan for percutaneous nephrostomy tube placement.  No objection to holding aspirin x 5 days for procedure.

## 2024-01-31 NOTE — H&P ADULT - NSHPPHYSICALEXAM_GEN_ALL_CORE
T(C): 36 (01-31-24 @ 14:30), Max: 36.7 (01-31-24 @ 11:07)  HR: 60 (01-31-24 @ 15:30) (60 - 88)  BP: 150/107 (01-31-24 @ 15:30) (145/97 - 163/104)  RR: 21 (01-31-24 @ 15:30) (13 - 25)  SpO2: 100% (01-31-24 @ 15:30) (98% - 100%)    PHYSICAL EXAM:  GENERAL: NAD, well-developed  HEAD:  Atraumatic, Normocephalic  EYES: EOMI, PERRLA, conjunctiva and sclera clear  NECK: Supple, No JVD  CHEST/LUNG: Clear to auscultation bilaterally; No wheeze  HEART: Regular rate and rhythm; No murmurs, rubs, or gallops  ABDOMEN: Soft, Nontender, Nondistended; Bowel sounds present  EXTREMITIES:  2+ Peripheral Pulses, No clubbing, cyanosis, or edema  PSYCH: AAOx3  NEUROLOGY: non-focal  SKIN: No rashes or lesions

## 2024-01-31 NOTE — PROCEDURE NOTE - PROCEDURE FINDINGS AND DETAILS
technically successful US and fluoroscopic guided left ureteral stent placement with covering PCN. PCN left to gravity drainage overnight.

## 2024-01-31 NOTE — END OF VISIT
[Time Spent: ___ minutes] : I have spent [unfilled] minutes of time on the encounter. [FreeTextEntry3] : I, Dr Khan, personally performed the evaluation & management (E/M) services for this new patient. The E/M includes conducting initial evaluation, assessing all conditions, and establishing the plan of care. Today, my ACP, Danay GALLEGOS-BC, was present to observe my evaluation & management services for this patient to be followed, going forward.

## 2024-01-31 NOTE — HISTORY OF PRESENT ILLNESS
1.93 [0] : ~His/Her~ pain was 0 out of 10 [FreeTextEntry1] : Mr. Bocanegra is a 63 y.o male with a PMH of DMT2, HTN, bladder cancer s/p resection now on BCG treatement, now with left hydronephrosis who presents to IR for consultation for left percutaneous nephrostomy with antegrade stent.  Pt was admitted to hospital 10/28 with gross hematuria. He had minimal prior medical care. Pt had fisher placed and CBI was started. He was noted to be hyperglycemic (hgb a1c=11) and anemic. On CT noted to have clot vs mass in bladder. Had L hydro with small probable distal ureteral stone. Pt failed to wean from CBI and was taken to the OR where large clot was noted as well as large bladder tumor along trigone and L bladder floor obscuring L UO.  He is currently receiving BCG treatment. He had his first treatment 1/8 and has his second scheduled on 1/12. He was previously taken to OR x 2 but unable to find ureteral obstruction.   He is now referred to IR for left percutaneous nephrostomy with antegrade stent by Dr. Rahman.   Today he reports feeling well. No longer has fisher catheter in. Denies gross hematuria but reports some spotting after BCG treatment 1/8. Denies dysuria, frequency, flank pain.   He reports he was taken off of his insulin as his glucose is now under control and currently only takes Janumet.   He currently lives with his brother who is bedbound but has his nephew nearby who has been helping with his care during/after procedure.  PCP: Shayy Lin [phone] Uro: Lacey  Endo: Essence Dominguez) [phone]  Discharge Medications aspirin 81 mg oral delayed release tablet: 1 tab(s) orally once a day cyanocobalamin 1000 mcg oral tablet: 1 tab(s) orally once a day folic acid 1 mg oral tablet: 1 tab(s) orally once a day Janumet 50 mg-1000 mg oral tablet: 1 tab(s) orally 2 times a day losartan 25 mg oral tablet: 1 tab(s) orally once a day oxyBUTYnin 5 mg oral tablet: 1 tab(s) orally 3 times a day tamsulosin 0.4 mg oral capsule: 1 cap(s) orally once a day (at bedtime)

## 2024-01-31 NOTE — H&P ADULT - ASSESSMENT
62 yo male who is my office ptn presented to IT for Left PCN and ureteral stent placement. Ptn was admitted 10/28-11/2 at which time he presented w gross hematuria, was found to have a bladder tumor( resected via cystoscopy) and a LEFT UVJ stone, ptn was seen by  and has F/Usince and started treatment for bladder Ca. Ptn returned to the hospital today for Left PCN and Ureteral stent placement 2/2 hydro 2/2 stone. Dr Rahman is his urologist  Post procedure ptn noted to have CO2 retention and hypertensive urgency w . ptn states he has been compliant w his meds.  On the above mentioned admission ptn was also Dx w new onset DM, HTN. Obesity. CAROLINE  He was referred for a sleep study on outptn basis but hasnt done so bc has been busy w appnts re his newly diagnosed Ca.  Presently in the nephrostomy bag he has gross hematuria, he is on BIPAP, sleeping but arousable , Pain free, MS at baseline, A&Ox3. recognized me. he has no fever, no chills, no abd pain, no N/V    4 hours later ptn was taken off BIPAP, awake, alert saturating well on RA at 94%, will rpt VBG. pulm consult placed re if should have BIPAP overnight.   BP is stable at 144/79. will cont outptn Losartan.  reassess in am  will need /IR F/U prior to DC , hopefully in am  Insulin on a sliding scale  dvt ppx w SCD

## 2024-01-31 NOTE — H&P ADULT - HISTORY OF PRESENT ILLNESS
62 yo male who is my office ptn presented to IT for Left PCN and ureteral stent placement. Ptn was admitted 10/28-11/2 at which time he presented w gross hematuria, was found to have a bladder tumor( resected via cystoscopy) and a LEFT UVJ stone, ptn was seen by  and has F/Usince and started treatment for bladder Ca. Ptn returned to the hospital today for Left PCN and Ureteral stent placement 2/2 hydro 2/2 stone. Dr Rahman is his urologist  Post procedure ptn noted to have CO2 retention and hypertensive urgency w . ptn states he has been compliant w his meds.  On the above mentioned admission ptn was also Dx w new onset DM, HTN. Obesity. CAROLINE  He was referred for a sleep study on outptn basis but hasnt done so bc has been busy w appnts re his newly diagnosed Ca.  Presently in the nephrostomy bag he has gross hematuria, he is on BIPAP, sleeping but arousable , Pain free, MS at baseline, A&Ox3. recognized me. he has no fever, no chills, no abd pain, no N/V

## 2024-01-31 NOTE — H&P ADULT - NSICDXPASTSURGICALHX_GEN_ALL_CORE_FT
PAST SURGICAL HISTORY:  H/O cystoscopy     H/O transurethral resection of bladder tumor (TURBT)

## 2024-01-31 NOTE — PACU DISCHARGE NOTE - COMMENTS
Hysteroscopy/ D & C/Polypectomy  Discharge Instructions    Diet as tolerated  Resume usual medications  Take new medications as prescribed.  Activity as tolerated  No heavy lifting  Change andrade pad as needed  No use of tampons or douche for 2 weeks  May resume sexual activity after 2 weeks  May drive when not on pain medications or not in pain    Patient/Family given For your Well Being Teaching Sheet:  _x__ Care After Anesthesia/ Sedation  _x__ Pain Management Tips  _x__ About Surgical Site infection  ___ Smoking and second hand Smoke information  ___ Other:     FOLLOW-UP CARE  ___ Make an appointment to see your doctor in _______days.  ___ Call for results in ______days    Call your doctor if you have:  Bleeding that soaks more than one sanitary pad in one hour  Severe abdominal pain  Severe cramps  Fever above 100.4 (F)  Foul smelling vaginal discharge  Trouble urinating    
no anesthesia complications

## 2024-01-31 NOTE — ASSESSMENT
[Other: _____] : [unfilled] [FreeTextEntry1] : Mr. Bocanegra is a 63 y.o male with a PMH of DMT2, HTN, bladder cancer s/p resection now on BCG treatement, now with left hydronephrosis who presents to IR for consultation for left percutaneous nephrostomy with antegrade stent.  1. Hydronephrosis of left kidney - pt admitted with gross hematuria, on workup was found to have large clot as well as large bladder tumor along trigone and L bladder floor obscuring L UO now s/p resection of bladder tumor but still with left hydronephrosis - previously taken to OR x 2 but unable to locate ureteral obstruction - referred to IR by Dr. Rahman  for left percutaneous nephrostomy with antegrade stent - he reports feeling well- denies dysuria, frequency, flank pain- had some spotting after BCG treatment yesterday - 12/18/23 CT abdomen demonstrates: > Persistent left hydroureteronephrosis with a persistent distal left ureteral calculus as well as thickening of the posterior left bladder wall surrounding the left ureteral vesicular junction, correlate for known transitional cell carcinoma > Layering likely debris within the mid to distal left ureter without enhancing mass discretely identified on current study. > Subcentimeter retroperitoneal and pelvic lymph nodes. > Cirrhotic liver with additional findings of portal hypertension - imaging reviewed and discussed with pt - recommending left percutaneous nephrostomy with antegrade stent- discussed will perform capping trial prior to removal of nephrostomy - I reviewed the procedure, including the risks, benefits, alternatives, and expected post procedure course. - would like to hold baby aspirin x 5 days-pt unsure of why he takes it- will discuss with pcp Dr. Nanette Reed if feasible to hold x 5 days preop- left vm with Dr. Reed on 1/9 awaiting call back -will require PSTs - pt lives with brother who is bedbound but has nephew who has been helping with procedures and care after-discussed he will need nephew to drive him home and stay with him overnight - will make arrangements  2. DMT2 - pt with minimal care prior to recent hospitalization-most recently dx with DMT2  - A1C eval as per PST (previously 10.8 on 10/29/23 in [facility]- prior to DM medications) - pre op medication dosing modification / hold as per PST  - he was prescribed janument, glimepiride, and lantus in the hospital but states everything was discontinued except Janumet - briefly discussed he will need to hold Janumet as directed by PST - FBS pre op dos  I have provided the patient the opportunity to ask questions and have answered them to their satisfaction.  They are encouraged to contact our office with any further questions, concerns, or issues.

## 2024-01-31 NOTE — H&P ADULT - NSICDXPASTMEDICALHX_GEN_ALL_CORE_FT
PAST MEDICAL HISTORY:  Bladder tumor     DM2 (diabetes mellitus, type 2)     History of umbilical hernia     HTN (hypertension)     Obese

## 2024-01-31 NOTE — REASON FOR VISIT
[Consultation] : a consultation visit [Home] : at home, [unfilled] , at the time of the visit. [Other Location: e.g. Home (Enter Location, City,State)___] : at [unfilled] [Patient] : the patient [This encounter was initiated by telehealth (audio with video) and converted to telephone (audio only) due to technical difficulties.] : This encounter was initiated by telehealth (audio with video) and converted to telephone (audio only) due to technical difficulties. [FreeTextEntry1] : Left percutaneous nephrostomy with antegrade stent

## 2024-01-31 NOTE — PATIENT PROFILE ADULT - FALL HARM RISK - FACTORS
HPI:  6/29/20, Time: 4:05 PM EDT           Jett Singh is a 81 y.o. male presenting to the ED for Syncope while at Dr. Merary Donnelly office, beginning several minutes ago. The complaint has been persistent, moderate in severity, and worsened by nothing. History was from patient's daughter states that she was signing him up to see Dr. Teri Saini in his office when she turned around found him passed out in a chair. Patient that he is been fatigued and short of breath recently. He has no chest pain. He denies fever chills or cough. He has no melena hematochezia or abdominal pain. He had a recent skin tear over his right lower leg he has been using topical antibiotics for. History of DVT or PE.    ROS:   Pertinent positives and negatives are stated within HPI, all other systems reviewed and are negative.  --------------------------------------------- PAST HISTORY ---------------------------------------------  Past Medical History:  has a past medical history of Anemia, Aneurysm of aorta ([address] Bronchitis, CAD (coronary artery disease), Hypertension, and Thyroid disease. Past Surgical History:  has a past surgical history that includes Diagnostic Cardiac Cath Lab Procedure (08/2004); Hemorrhoid surgery; Cataract removal (08/2011,10/2011); Colonoscopy (01/2016); transesophageal echocardiogram (07/30/2019); and Cardioversion (07/2019). Social History:  reports that he quit smoking about 46 years ago. His smoking use included cigarettes. He has a 5.00 pack-year smoking history. He has never used smokeless tobacco. He reports that he does not drink alcohol. Family History: Family history is unknown by patient. The patients home medications have been reviewed.     Allergies: Reglan [metoclopramide] and Other    ---------------------------------------------------PHYSICAL EXAM--------------------------------------    Constitutional/General: Alert and oriented x3, well appearing, non toxic in NAD thin appearance   Head: Normocephalic and atraumatic  Eyes: PERRL, EOMI  Mouth: Oropharynx clear, handling secretions, no trismus  Neck: Supple, full ROM, non tender to palpation in the midline, no stridor, no crepitus, no meningeal signs  Pulmonary: Lungs clear to auscultation bilaterally, no wheezes, rales, or rhonchi. Not in respiratory distress  Cardiovascular:  IrRegular rate. IrRegular rhythm. No murmurs, gallops, or rubs. 2+ distal pulses  Chest: no chest wall tenderness  Abdomen: Soft. Non tender. Non distended. +BS. No rebound, guarding, or rigidity. No pulsatile masses appreciated. Musculoskeletal: Moves all extremities x 4. Warm and well perfused, no clubbing, cyanosis, or edema. Capillary refill <3 seconds  Skin: warm and dry. No rashes. Neurologic: GCS 15, CN 2-12 grossly intact, no focal deficits, symmetric strength 5/5 in the upper and lower extremities bilaterally  Psych: Normal Affect    -------------------------------------------------- RESULTS -------------------------------------------------  I have personally reviewed all laboratory and imaging results for this patient. Results are listed below.      LABS:  Results for orders placed or performed during the hospital encounter of 06/29/20   CBC   Result Value Ref Range    WBC 6.1 4.5 - 11.5 E9/L    RBC 3.50 (L) 3.80 - 5.80 E12/L    Hemoglobin 9.0 (L) 12.5 - 16.5 g/dL    Hematocrit 29.8 (L) 37.0 - 54.0 %    MCV 85.1 80.0 - 99.9 fL    MCH 25.7 (L) 26.0 - 35.0 pg    MCHC 30.2 (L) 32.0 - 34.5 %    RDW 21.7 (H) 11.5 - 15.0 fL    Platelets 591 152 - 112 E9/L    MPV 10.2 7.0 - 12.0 fL   Troponin   Result Value Ref Range    Troponin 0.09 (H) 0.00 - 0.03 ng/mL   D-Dimer, Quantitative   Result Value Ref Range    D-Dimer, Quant 972 ng/mL DDU   Basic Metabolic Panel   Result Value Ref Range    Sodium 139 132 - 146 mmol/L    Potassium 4.7 3.5 - 5.0 mmol/L    Chloride 99 98 - 107 mmol/L    CO2 26 22 - 29 mmol/L    Anion Gap 14 7 - 16 mmol/L    Glucose 107 (H) 74 - 99 mg/dL    BUN 60 (H) 8 - 23 mg/dL    CREATININE 2.0 (H) 0.7 - 1.2 mg/dL    GFR Non-African American 38 >=60 mL/min/1.73    GFR African American 38     Calcium 10.1 8.6 - 10.2 mg/dL   APTT   Result Value Ref Range    aPTT 30.3 24.5 - 35.1 sec   EKG 12 Lead   Result Value Ref Range    Ventricular Rate 84 BPM    Atrial Rate 96 BPM    QRS Duration 118 ms    Q-T Interval 432 ms    QTc Calculation (Bazett) 510 ms    R Axis -39 degrees    T Axis 123 degrees       RADIOLOGY:  Interpreted by Radiologist.  XR CHEST STANDARD (2 VW)   Final Result   Cardiomegaly   Tortuous aorta   There are patchy infiltrates seen throughout both the lung fields. Pulmonary edema could have this appearance   There are bilateral pleural effusions present. EKG Interpretation  Interpreted by emergency department physician    Rhythm: atrial fibrillation - controlled  Rate: 84  Axis: left  Conduction: nonspecific interventricular conduction block  ST Segments: depression in  nonspecific  T Waves: inversion in  I and aVl    Clinical Impression: atrial fibrillation (chronic)  Comparison to prior EKG: unchnaged        ------------------------- NURSING NOTES AND VITALS REVIEWED ---------------------------   The nursing notes within the ED encounter and vital signs as below have been reviewed by myself. /70   Pulse 78   Temp 98 °F (36.7 °C)   Resp 23   Ht 5' 6\" (1.676 m)   Wt 145 lb (65.8 kg)   SpO2 100%   BMI 23.40 kg/m²   Oxygen Saturation Interpretation: Normal    The patients available past medical records and past encounters were reviewed.         ------------------------------ ED COURSE/MEDICAL DECISION MAKING----------------------  Medications   heparin (porcine) injection 3,950 Units (has no administration in time range)   heparin (porcine) injection 1,970 Units (has no administration in time range)   heparin 25,000 units in dextrose 5% 250 mL infusion (12 Units/kg/hr × 65.8 kg Intravenous New Bag 6/29/20 1723)   aspirin tablet 325 mg (325 mg Oral Given 6/29/20 1721)   heparin (porcine) injection 3,950 Units (3,950 Units Intravenous Given 6/29/20 1723)             Medical Decision Making:    Patient presents with syncopal event found to be hypoxic upon arrival.  EKG demonstrates atrial fibrillation with lateral ischemic changes. Changes are unchanged from previous EKGs. Patient's troponin was 0.09.  D-dimers 972. He does complain of shortness of breath with no chest pain. Unable to obtain CT of the chest because of chronic kidney disease with GFR 38. Patient was started on low-dose heparin. Admit patient to telemetry. Ultra Sound of the legs are pending. Re-Evaluations:             Re-evaluation. Patients symptoms show no change      Consultations:             Dr. Catherine Reyes will admit. Critical Care:   CRITICAL CARE:   30 MINUTES. Please note that the withdrawal or failure to initiate urgent interventions for this patient would likely result in a life threatening deterioration or permanent disability. Accordingly this patient received the above mentioned time, excluding separately billable procedures. This patient's ED course included: a personal history and physicial examination, re-evaluation prior to disposition, multiple bedside re-evaluations, IV medications, cardiac monitoring, continuous pulse oximetry, complex medical decision making and emergency management and a personal history and physicial eaxmination    This patient has remained hemodynamically stable, improved and been closely monitored during their ED course. Counseling: The emergency provider has spoken with the patient and family member patient and daughter and discussed todays results, in addition to providing specific details for the plan of care and counseling regarding the diagnosis and prognosis. Questions are answered at this time and they are agreeable with the plan. --------------------------------- IMPRESSION AND DISPOSITION ---------------------------------    IMPRESSION  1. Syncope and collapse    2. Chronic renal failure in pediatric patient, stage 3 (moderate) (HCC)    3. Elevated d-dimer        DISPOSITION  Disposition: Admit to telemetry  Patient condition is stable        NOTE: This report was transcribed using voice recognition software.  Every effort was made to ensure accuracy; however, inadvertent computerized transcription errors may be present        Lani DO Karen  06/29/20 8883 Post procedure

## 2024-01-31 NOTE — PATIENT PROFILE ADULT - FALL HARM RISK - HARM RISK INTERVENTIONS

## 2024-01-31 NOTE — PROCEDURE NOTE - PLAN
-patient admitted to Dr. Sharpe for overnight observation  -PACU 2 hours  -strict bedrest 4 hours  -IR to f/u in AM to cap PCN and OK for d/c  -avoid DVT ppx in favor of SCDs  -patient to f/u in 1-2 weeks for PCN removal if tolerated capping

## 2024-02-01 ENCOUNTER — TRANSCRIPTION ENCOUNTER (OUTPATIENT)
Age: 64
End: 2024-02-01

## 2024-02-01 VITALS
OXYGEN SATURATION: 92 % | HEART RATE: 73 BPM | DIASTOLIC BLOOD PRESSURE: 82 MMHG | TEMPERATURE: 98 F | SYSTOLIC BLOOD PRESSURE: 160 MMHG | RESPIRATION RATE: 18 BRPM

## 2024-02-01 DIAGNOSIS — F17.200 NICOTINE DEPENDENCE, UNSPECIFIED, UNCOMPLICATED: ICD-10-CM

## 2024-02-01 DIAGNOSIS — R06.02 SHORTNESS OF BREATH: ICD-10-CM

## 2024-02-01 DIAGNOSIS — N13.30 UNSPECIFIED HYDRONEPHROSIS: ICD-10-CM

## 2024-02-01 DIAGNOSIS — N13.2 HYDRONEPHROSIS WITH RENAL AND URETERAL CALCULOUS OBSTRUCTION: ICD-10-CM

## 2024-02-01 DIAGNOSIS — J96.02 ACUTE RESPIRATORY FAILURE WITH HYPERCAPNIA: ICD-10-CM

## 2024-02-01 DIAGNOSIS — E11.9 TYPE 2 DIABETES MELLITUS WITHOUT COMPLICATIONS: ICD-10-CM

## 2024-02-01 DIAGNOSIS — D49.4 NEOPLASM OF UNSPECIFIED BEHAVIOR OF BLADDER: ICD-10-CM

## 2024-02-01 LAB
A1C WITH ESTIMATED AVERAGE GLUCOSE RESULT: 6.3 % — HIGH (ref 4–5.6)
ANION GAP SERPL CALC-SCNC: 15 MMOL/L — SIGNIFICANT CHANGE UP (ref 5–17)
BASE EXCESS BLDV CALC-SCNC: 2.1 MMOL/L — SIGNIFICANT CHANGE UP (ref -2–3)
BUN SERPL-MCNC: 17 MG/DL — SIGNIFICANT CHANGE UP (ref 7–23)
CA-I SERPL-SCNC: 1.13 MMOL/L — LOW (ref 1.15–1.33)
CALCIUM SERPL-MCNC: 8.5 MG/DL — SIGNIFICANT CHANGE UP (ref 8.4–10.5)
CHLORIDE BLDV-SCNC: 102 MMOL/L — SIGNIFICANT CHANGE UP (ref 96–108)
CHLORIDE SERPL-SCNC: 101 MMOL/L — SIGNIFICANT CHANGE UP (ref 96–108)
CO2 BLDV-SCNC: 30 MMOL/L — HIGH (ref 22–26)
CO2 SERPL-SCNC: 26 MMOL/L — SIGNIFICANT CHANGE UP (ref 22–31)
CREAT SERPL-MCNC: 1.3 MG/DL — SIGNIFICANT CHANGE UP (ref 0.5–1.3)
EGFR: 62 ML/MIN/1.73M2 — SIGNIFICANT CHANGE UP
ESTIMATED AVERAGE GLUCOSE: 134 MG/DL — HIGH (ref 68–114)
GAS PNL BLDV: 135 MMOL/L — LOW (ref 136–145)
GAS PNL BLDV: SIGNIFICANT CHANGE UP
GAS PNL BLDV: SIGNIFICANT CHANGE UP
GLUCOSE BLDC GLUCOMTR-MCNC: 131 MG/DL — HIGH (ref 70–99)
GLUCOSE BLDC GLUCOMTR-MCNC: 158 MG/DL — HIGH (ref 70–99)
GLUCOSE BLDC GLUCOMTR-MCNC: 162 MG/DL — HIGH (ref 70–99)
GLUCOSE BLDV-MCNC: 120 MG/DL — HIGH (ref 70–99)
GLUCOSE SERPL-MCNC: 125 MG/DL — HIGH (ref 70–99)
HCO3 BLDV-SCNC: 29 MMOL/L — SIGNIFICANT CHANGE UP (ref 22–29)
HCT VFR BLD CALC: 40.8 % — SIGNIFICANT CHANGE UP (ref 39–50)
HCT VFR BLDA CALC: 39 % — SIGNIFICANT CHANGE UP (ref 39–51)
HGB BLD CALC-MCNC: 13.1 G/DL — SIGNIFICANT CHANGE UP (ref 12.6–17.4)
HGB BLD-MCNC: 13.1 G/DL — SIGNIFICANT CHANGE UP (ref 13–17)
LACTATE BLDV-MCNC: 2.4 MMOL/L — HIGH (ref 0.5–2)
LACTATE SERPL-SCNC: 2.3 MMOL/L — HIGH (ref 0.5–2)
MCHC RBC-ENTMCNC: 28.5 PG — SIGNIFICANT CHANGE UP (ref 27–34)
MCHC RBC-ENTMCNC: 32.1 GM/DL — SIGNIFICANT CHANGE UP (ref 32–36)
MCV RBC AUTO: 88.7 FL — SIGNIFICANT CHANGE UP (ref 80–100)
NRBC # BLD: 0 /100 WBCS — SIGNIFICANT CHANGE UP (ref 0–0)
PCO2 BLDV: 52 MMHG — SIGNIFICANT CHANGE UP (ref 42–55)
PH BLDV: 7.35 — SIGNIFICANT CHANGE UP (ref 7.32–7.43)
PLATELET # BLD AUTO: 123 K/UL — LOW (ref 150–400)
PO2 BLDV: 53 MMHG — HIGH (ref 25–45)
POTASSIUM BLDV-SCNC: 3.8 MMOL/L — SIGNIFICANT CHANGE UP (ref 3.5–5.1)
POTASSIUM SERPL-MCNC: 3.8 MMOL/L — SIGNIFICANT CHANGE UP (ref 3.5–5.3)
POTASSIUM SERPL-SCNC: 3.8 MMOL/L — SIGNIFICANT CHANGE UP (ref 3.5–5.3)
RBC # BLD: 4.6 M/UL — SIGNIFICANT CHANGE UP (ref 4.2–5.8)
RBC # FLD: 18.6 % — HIGH (ref 10.3–14.5)
SAO2 % BLDV: 84.5 % — SIGNIFICANT CHANGE UP (ref 67–88)
SODIUM SERPL-SCNC: 142 MMOL/L — SIGNIFICANT CHANGE UP (ref 135–145)
WBC # BLD: 9.12 K/UL — SIGNIFICANT CHANGE UP (ref 3.8–10.5)
WBC # FLD AUTO: 9.12 K/UL — SIGNIFICANT CHANGE UP (ref 3.8–10.5)

## 2024-02-01 PROCEDURE — 83605 ASSAY OF LACTIC ACID: CPT

## 2024-02-01 PROCEDURE — 93005 ELECTROCARDIOGRAM TRACING: CPT

## 2024-02-01 PROCEDURE — 82435 ASSAY OF BLOOD CHLORIDE: CPT

## 2024-02-01 PROCEDURE — 83036 HEMOGLOBIN GLYCOSYLATED A1C: CPT

## 2024-02-01 PROCEDURE — 82803 BLOOD GASES ANY COMBINATION: CPT

## 2024-02-01 PROCEDURE — 82565 ASSAY OF CREATININE: CPT

## 2024-02-01 PROCEDURE — C1729: CPT

## 2024-02-01 PROCEDURE — 83880 ASSAY OF NATRIURETIC PEPTIDE: CPT

## 2024-02-01 PROCEDURE — 84132 ASSAY OF SERUM POTASSIUM: CPT

## 2024-02-01 PROCEDURE — C1769: CPT

## 2024-02-01 PROCEDURE — 36415 COLL VENOUS BLD VENIPUNCTURE: CPT

## 2024-02-01 PROCEDURE — C1887: CPT

## 2024-02-01 PROCEDURE — 82330 ASSAY OF CALCIUM: CPT

## 2024-02-01 PROCEDURE — 84295 ASSAY OF SERUM SODIUM: CPT

## 2024-02-01 PROCEDURE — 82962 GLUCOSE BLOOD TEST: CPT

## 2024-02-01 PROCEDURE — 82947 ASSAY GLUCOSE BLOOD QUANT: CPT

## 2024-02-01 PROCEDURE — 50695 PLMT URETERAL STENT PRQ: CPT

## 2024-02-01 PROCEDURE — 94660 CPAP INITIATION&MGMT: CPT

## 2024-02-01 PROCEDURE — 86900 BLOOD TYPING SEROLOGIC ABO: CPT

## 2024-02-01 PROCEDURE — C1894: CPT

## 2024-02-01 PROCEDURE — 86901 BLOOD TYPING SEROLOGIC RH(D): CPT

## 2024-02-01 PROCEDURE — 86922 COMPATIBILITY TEST ANTIGLOB: CPT

## 2024-02-01 PROCEDURE — C2617: CPT

## 2024-02-01 PROCEDURE — 71045 X-RAY EXAM CHEST 1 VIEW: CPT

## 2024-02-01 PROCEDURE — 85014 HEMATOCRIT: CPT

## 2024-02-01 PROCEDURE — 80053 COMPREHEN METABOLIC PANEL: CPT

## 2024-02-01 PROCEDURE — 80048 BASIC METABOLIC PNL TOTAL CA: CPT

## 2024-02-01 PROCEDURE — 85018 HEMOGLOBIN: CPT

## 2024-02-01 PROCEDURE — 94640 AIRWAY INHALATION TREATMENT: CPT

## 2024-02-01 PROCEDURE — 85027 COMPLETE CBC AUTOMATED: CPT

## 2024-02-01 PROCEDURE — 86850 RBC ANTIBODY SCREEN: CPT

## 2024-02-01 RX ORDER — IPRATROPIUM/ALBUTEROL SULFATE 18-103MCG
3 AEROSOL WITH ADAPTER (GRAM) INHALATION EVERY 6 HOURS
Refills: 0 | Status: DISCONTINUED | OUTPATIENT
Start: 2024-02-01 | End: 2024-02-01

## 2024-02-01 RX ORDER — BUDESONIDE AND FORMOTEROL FUMARATE DIHYDRATE 160; 4.5 UG/1; UG/1
2 AEROSOL RESPIRATORY (INHALATION)
Refills: 0 | Status: DISCONTINUED | OUTPATIENT
Start: 2024-02-01 | End: 2024-02-01

## 2024-02-01 RX ORDER — BUDESONIDE AND FORMOTEROL FUMARATE DIHYDRATE 160; 4.5 UG/1; UG/1
1 AEROSOL RESPIRATORY (INHALATION)
Qty: 1 | Refills: 0
Start: 2024-02-01 | End: 2024-03-01

## 2024-02-01 RX ORDER — ASPIRIN/CALCIUM CARB/MAGNESIUM 324 MG
1 TABLET ORAL
Refills: 0 | DISCHARGE

## 2024-02-01 RX ADMIN — Medication 1: at 17:39

## 2024-02-01 RX ADMIN — Medication 5 MILLIGRAM(S): at 05:57

## 2024-02-01 RX ADMIN — BUDESONIDE AND FORMOTEROL FUMARATE DIHYDRATE 2 PUFF(S): 160; 4.5 AEROSOL RESPIRATORY (INHALATION) at 17:39

## 2024-02-01 RX ADMIN — PREGABALIN 1000 MICROGRAM(S): 225 CAPSULE ORAL at 12:51

## 2024-02-01 RX ADMIN — Medication 650 MILLIGRAM(S): at 08:23

## 2024-02-01 RX ADMIN — Medication 1 MILLIGRAM(S): at 12:38

## 2024-02-01 RX ADMIN — Medication 5 MILLIGRAM(S): at 12:38

## 2024-02-01 RX ADMIN — SODIUM CHLORIDE 75 MILLILITER(S): 9 INJECTION, SOLUTION INTRAVENOUS at 06:02

## 2024-02-01 RX ADMIN — Medication 1: at 12:39

## 2024-02-01 RX ADMIN — LOSARTAN POTASSIUM 25 MILLIGRAM(S): 100 TABLET, FILM COATED ORAL at 05:57

## 2024-02-01 NOTE — DISCHARGE NOTE NURSING/CASE MANAGEMENT/SOCIAL WORK - NSDCFUADDAPPT_GEN_ALL_CORE_FT
You have an appointment with IR on 2/15/2024 @ 1PM. Should you need to reschedule you may call the IR booking office @ [phone]. Please feel free to contact us at [phone] if any problems arise. After 6PM, Monday through Friday, on weekends and on holidays, please call [phone] and ask for the radiology resident on call to be paged.

## 2024-02-01 NOTE — DISCHARGE NOTE NURSING/CASE MANAGEMENT/SOCIAL WORK - NSDCPEFALRISK_GEN_ALL_CORE
For information on Fall & Injury Prevention, visit: [email or web address] OR  [email or web address] OR  https://www.cdc.gov/steadi/patient.html

## 2024-02-01 NOTE — PROGRESS NOTE ADULT - SUBJECTIVE AND OBJECTIVE BOX
Patient is a 63y old  Male who presents with a chief complaint of acute hypercapnic respiratory failure, hypertensive urgency post IR procedure (01 Feb 2024 15:52)      SUBJECTIVE / OVERNIGHT EVENTS: stable, cleared for DC home, will need outptn card and pulm F/U and of course GI F/U. has an appnt w me 3/18 12PM    MEDICATIONS  (STANDING):  budesonide 160 MICROgram(s)/formoterol 4.5 MICROgram(s) Inhaler 2 Puff(s) Inhalation two times a day  cyanocobalamin 1000 MICROGram(s) Oral daily  dextrose 5%. 1000 milliLiter(s) (50 mL/Hr) IV Continuous <Continuous>  dextrose 5%. 1000 milliLiter(s) (100 mL/Hr) IV Continuous <Continuous>  dextrose 50% Injectable 25 Gram(s) IV Push once  dextrose 50% Injectable 25 Gram(s) IV Push once  dextrose 50% Injectable 12.5 Gram(s) IV Push once  folic acid 1 milliGRAM(s) Oral daily  glucagon  Injectable 1 milliGRAM(s) IntraMuscular once  insulin lispro (ADMELOG) corrective regimen sliding scale   SubCutaneous three times a day before meals  losartan 25 milliGRAM(s) Oral daily  oxybutynin 5 milliGRAM(s) Oral three times a day  sodium chloride 0.45%. 1000 milliLiter(s) (75 mL/Hr) IV Continuous <Continuous>  tamsulosin 0.4 milliGRAM(s) Oral at bedtime    MEDICATIONS  (PRN):  acetaminophen     Tablet .. 650 milliGRAM(s) Oral every 6 hours PRN Temp greater or equal to 38C (100.4F), Mild Pain (1 - 3)  albuterol/ipratropium for Nebulization 3 milliLiter(s) Nebulizer every 6 hours PRN Shortness of Breath and/or Wheezing  dextrose Oral Gel 15 Gram(s) Oral once PRN Blood Glucose LESS THAN 70 milliGRAM(s)/deciliter  hydrALAZINE Injectable 10 milliGRAM(s) IV Push every 6 hours PRN SBP>155 and or DBP> 105      Vital Signs Last 24 Hrs  T(F): 98.6 (02-01-24 @ 12:13), Max: 98.6 (01-31-24 @ 22:00)  HR: 67 (02-01-24 @ 12:13) (59 - 67)  BP: 157/84 (02-01-24 @ 12:13) (132/80 - 167/76)  RR: 18 (02-01-24 @ 12:13) (18 - 18)  SpO2: 92% (02-01-24 @ 12:13) (92% - 97%)  Telemetry:   CAPILLARY BLOOD GLUCOSE      POCT Blood Glucose.: 158 mg/dL (01 Feb 2024 17:29)  POCT Blood Glucose.: 162 mg/dL (01 Feb 2024 12:17)  POCT Blood Glucose.: 131 mg/dL (01 Feb 2024 08:10)  POCT Blood Glucose.: 181 mg/dL (31 Jan 2024 21:48)    I&O's Summary    31 Jan 2024 07:01  -  01 Feb 2024 07:00  --------------------------------------------------------  IN: 900 mL / OUT: 1550 mL / NET: -650 mL    01 Feb 2024 07:01  -  01 Feb 2024 19:59  --------------------------------------------------------  IN: 0 mL / OUT: 525 mL / NET: -525 mL        PHYSICAL EXAM:  GENERAL: NAD, well-developed  HEAD:  Atraumatic, Normocephalic  EYES: EOMI, PERRLA, conjunctiva and sclera clear  NECK: Supple, No JVD  CHEST/LUNG: Clear to auscultation bilaterally; No wheeze  HEART: Regular rate and rhythm; No murmurs, rubs, or gallops  ABDOMEN: Soft, Nontender, Nondistended; Bowel sounds present  EXTREMITIES:  2+ Peripheral Pulses, No clubbing, cyanosis, or edema  PSYCH: AAOx3  NEUROLOGY: non-focal  SKIN: No rashes or lesions    LABS:                        13.1   9.12  )-----------( 123      ( 01 Feb 2024 06:57 )             40.8     02-01    142  |  101  |  17  ----------------------------<  125<H>  3.8   |  26  |  1.30    Ca    8.5      01 Feb 2024 07:00    TPro  7.9  /  Alb  4.1  /  TBili  0.4  /  DBili  x   /  AST  31  /  ALT  26  /  AlkPhos  86  01-31          Urinalysis Basic - ( 01 Feb 2024 07:00 )    Color: x / Appearance: x / SG: x / pH: x  Gluc: 125 mg/dL / Ketone: x  / Bili: x / Urobili: x   Blood: x / Protein: x / Nitrite: x   Leuk Esterase: x / RBC: x / WBC x   Sq Epi: x / Non Sq Epi: x / Bacteria: x        RADIOLOGY & ADDITIONAL TESTS:    Imaging Personally Reviewed:    Consultant(s) Notes Reviewed:      Care Discussed with Consultants/Other Providers:  
Interventional Radiology Follow-Up Note    This is a 63y Male s/p left pcn and ureteral stent placement on 1/31/24 in Interventional Radiology with Dr. Khan.     S: Patient seen and examined @ bedside. Mild left flank soreness. No other complaints offered.     Medication:   losartan: (02-01)    Vitals: T(F): 97.7, Max: 98.9 (18:26)  HR: 59  BP: 167/76  RR: 18  SpO2: 97%    Physical Exam:  General: Nontoxic, in NAD, A&O x3.  Abdomen: soft, NTND, no peritoneal signs.  Extremities: No pedal edema or calf tenderness noted.  Drain Device: Left PCN drain intact attached to bag. Dressing clean, dry, intact. Draining cola colored urine    LABS:  WBC -- / Hgb -- / Hct -- / Plt --  Na 142 / K 3.8 / CO2 26 / Cl 101 / BUN 17 / Cr 1.30 / Glucose 125  ALT -- / AST -- / Alk Phos -- / Tbili --  Ptt -- / Pt -- / INR --

## 2024-02-01 NOTE — CONSULT NOTE ADULT - REASON FOR ADMISSION
acute hypercapnic respiratory failure, hypertensive urgency post IR procedure

## 2024-02-01 NOTE — CHART NOTE - NSCHARTNOTEFT_GEN_A_CORE
Pt requesting not to be placed on Bipap  States he never used Bipap/Cpap in his entire life  He is familiar with it because his bedbound brother uses it  He stated that he believes today's episode of hypercapnia was as a result of being sedated and having to lay on his stomach for the IR procedure  All risks and benefits explained to patient  chooses to remain on floor and not to use bipap  Case d/w respiratory therapist  CRYSTAL Meyers
Patient seen and assessed at bedside  Drainage from PCN now pink, no clots noted  Left PCN now capped   Educated patient on s/sx infection including fevers, chills, nausea, vomiting, abd pain, flank pain, etc. with return instructions  Patient demonstrates clear understanding of directions with teach back  Needs IR follow up in 1-2 weeks for drain evaluation  Above plan discussed with Dr. Bill Orellana to discharge home from IR standpoint  Rest of care per primary team

## 2024-02-01 NOTE — PROGRESS NOTE ADULT - REASON FOR ADMISSION
acute hypercapnic respiratory failure, hypertensive urgency post IR procedure
acute hypercapnic respiratory failure, hypertensive urgency post IR procedure

## 2024-02-01 NOTE — DISCHARGE NOTE NURSING/CASE MANAGEMENT/SOCIAL WORK - PATIENT PORTAL LINK FT
You can access the FollowMyHealth Patient Portal offered by [facility] by registering at the following website: [email or web address]. By joining [facility]’s FollowMyHealth portal, you will also be able to view your health information using other applications (apps) compatible with our system.

## 2024-02-01 NOTE — DISCHARGE NOTE PROVIDER - NSDCFUSCHEDAPPT_GEN_ALL_CORE_FT
[facility]  UROLOGY [address] [place] [address] R  Scheduled Appointment: 02/05/2024    Clarice Rahman  [facility]  UROLOGY [address] [place] [address] R  Scheduled Appointment: 02/05/2024    Clarice Rahman  [facility]  [facility]-Urology  Scheduled Appointment: 02/05/2024    [facility]  UROLOGY [address] [place] [address] R  Scheduled Appointment: 02/12/2024    Clarice Rahman  [facility]  UROLOGY [address] [place] [address] R  Scheduled Appointment: 02/12/2024

## 2024-02-01 NOTE — CONSULT NOTE ADULT - PROBLEM SELECTOR RECOMMENDATION 9
-Pt with acute hypercarbic respiratory failure post IR procedure requiring Bipap  -VBG this AM improved with no CO2 retention. Pt did not wear Bipap overnight  -VBGs in October 2023 with no CO2 retention  -CXR with basilar atelectasis but otherwise clear. L hemidiaphragm appears elevated   -Pt likely with underlying AMBREEN, +/- underlying lung disease given smoking hx   -Start Symbicort 160/4.5 mcg 2 puffs BID, Duoneb q6h PRN for SOB/wheezing  -Check ABG in AM off Bipap   -Keep sats >90% with O2 PRN (currently RA)  -Suggest eventual outpatient PSG and PFTs. -Pt with acute hypercarbic respiratory failure post IR procedure requiring Bipap  -VBG this AM improved with no CO2 retention. Pt did not wear Bipap overnight (refused per chart note)   -VBGs in October 2023 with no CO2 retention  -CXR with basilar atelectasis but otherwise clear. L hemidiaphragm appears elevated   -Pt likely with underlying AMBREEN, +/- underlying lung disease given smoking hx   -Start Symbicort 160/4.5 mcg 2 puffs BID, Duoneb q6h PRN for SOB/wheezing  -Check ABG in AM off Bipap if remains inpatient   -Keep sats >90% with O2 PRN (currently RA)  -Suggest eventual outpatient PSG and PFTs.

## 2024-02-01 NOTE — CONSULT NOTE ADULT - SUBJECTIVE AND OBJECTIVE BOX
Chief Complaint:  Patient is a 63y old  Male who presents with a chief complaint of acute hypercapnic respiratory failure, hypertensive urgency post IR procedure (01 Feb 2024 14:50)      HPI: Patient known to Dr. Wheat. He has bladder cancer and it has been managed by urology. He has hematuria and that has caused iron deficiency. He is being treated with oral iron. He has had a mild thrombocytopenia. He is here s/p ureteral stent.     Medications:  acetaminophen     Tablet .. 650 milliGRAM(s) Oral every 6 hours PRN  albuterol/ipratropium for Nebulization 3 milliLiter(s) Nebulizer every 6 hours PRN  budesonide 160 MICROgram(s)/formoterol 4.5 MICROgram(s) Inhaler 2 Puff(s) Inhalation two times a day  cyanocobalamin 1000 MICROGram(s) Oral daily  dextrose 5%. 1000 milliLiter(s) IV Continuous <Continuous>  dextrose 5%. 1000 milliLiter(s) IV Continuous <Continuous>  dextrose 50% Injectable 25 Gram(s) IV Push once  dextrose 50% Injectable 25 Gram(s) IV Push once  dextrose 50% Injectable 12.5 Gram(s) IV Push once  dextrose Oral Gel 15 Gram(s) Oral once PRN  folic acid 1 milliGRAM(s) Oral daily  glucagon  Injectable 1 milliGRAM(s) IntraMuscular once  hydrALAZINE Injectable 10 milliGRAM(s) IV Push every 6 hours PRN  insulin lispro (ADMELOG) corrective regimen sliding scale   SubCutaneous three times a day before meals  losartan 25 milliGRAM(s) Oral daily  oxybutynin 5 milliGRAM(s) Oral three times a day  sodium chloride 0.45%. 1000 milliLiter(s) IV Continuous <Continuous>  tamsulosin 0.4 milliGRAM(s) Oral at bedtime        Allergies:  No Known Allergies    PAST MEDICAL & SURGICAL HISTORY:  HTN (hypertension)      DM2 (diabetes mellitus, type 2)      Bladder tumor      Obese      History of umbilical hernia      H/O transurethral resection of bladder tumor (TURBT)      H/O cystoscopy    REVIEW OF SYSTEMS      General: Good appetite and no weight loss. Not tired. No fevers or chills or sweats	    Skin/Breast: No rash or itch  	  ENMT:	No nosebleeds    Respiratory and Thorax: No cough or SOB  	  Cardiovascular:	No CP    Gastrointestinal:	No N/V/D/C, BRBPR    Genitourinary:	NO dysuria. Urine pinkish    Musculoskeletal:	 No back pain, leg pain, or swelling    Neurological:	No HA or dizziness    Vitals:  Vital Signs Last 24 Hrs  T(C): 37 (01 Feb 2024 12:13), Max: 37.2 (31 Jan 2024 18:26)  T(F): 98.6 (01 Feb 2024 12:13), Max: 98.9 (31 Jan 2024 18:26)  HR: 67 (01 Feb 2024 12:13) (59 - 67)  BP: 157/84 (01 Feb 2024 12:13) (132/80 - 167/76)  BP(mean): --  RR: 18 (01 Feb 2024 12:13) (18 - 22)  SpO2: 92% (01 Feb 2024 12:13) (92% - 100%)    Parameters below as of 01 Feb 2024 12:13  Patient On (Oxygen Delivery Method): room air        Pex:  alert NAD  EOMI anicteric sclera  Neck  No LNA  Cv s1 S2 RRR  Lungs clear B/L  abd soft NT ND +BS  No LE edema or tenderness    Labs:                        13.1   9.12  )-----------( 123      ( 01 Feb 2024 06:57 )             40.8     CBC Full  -  ( 01 Feb 2024 06:57 )  WBC Count : 9.12 K/uL  RBC Count : 4.60 M/uL  Hemoglobin : 13.1 g/dL  Hematocrit : 40.8 %  Platelet Count - Automated : 123 K/uL  Mean Cell Volume : 88.7 fl  Mean Cell Hemoglobin : 28.5 pg  Mean Cell Hemoglobin Concentration : 32.1 gm/dL  Auto Neutrophil # : x  Auto Lymphocyte # : x  Auto Monocyte # : x  Auto Eosinophil # : x  Auto Basophil # : x  Auto Neutrophil % : x  Auto Lymphocyte % : x  Auto Monocyte % : x  Auto Eosinophil % : x  Auto Basophil % : x    02-01    142  |  101  |  17  ----------------------------<  125<H>  3.8   |  26  |  1.30    Ca    8.5      01 Feb 2024 07:00    TPro  7.9  /  Alb  4.1  /  TBili  0.4  /  DBili  x   /  AST  31  /  ALT  26  /  AlkPhos  86  01-31      [id number]
Pulmonary Consult   02-01-24 @ 12:40    Patient is a 63y old  Male who presents with a chief complaint of acute hypercapnic respiratory failure, hypertensive urgency post IR procedure (01 Feb 2024 11:09)      HPI: 62 y/o M with PMH of obesity, HTN, DM, bladder tumor s/p TURBT, umbilical hernia. Recent admission 10/28-11/2 for hematuria found to have bladder tumor (now s/p resection) and L UVJ stone. Presents to IR for L PCN and ureteral stent placement 2nd to hydronephrosis. Post procedure pt noted to have hypertensive urgency, acute hypercarbic respiratory failure requiring Bipap. Pulmonary called to further evaluation. Pt reports being a current smoke for most of his life, currently smoking 1/2 ppd and trying to quit. Denies hx of diagnosed lung disease, inhaler use. Reports mild dyspnea with exertion at times, but otherwise denies any respiratory symptoms prior to admission. Denies hx of AMBREEN. At this time denies any SOB, CP, fever, chills. Did not wear bipap overnight. O2 sats 94% on RA at rest.     ?FOLLOWING PRESENT  [ no  ] Hx of PE/DVT, [ no ] Hx COPD, [ no Hx of Asthma, [ yes ] Hx of Hospitalization, [ no ]  Hx of BiPAP/CPAP use, [  no ] Hx of AMBREEN    No Known Allergies      PAST MEDICAL & SURGICAL HISTORY:  HTN (hypertension)  DM2 (diabetes mellitus, type 2)  Bladder tumor  Obese  History of umbilical hernia  H/O transurethral resection of bladder tumor (TURBT)  H/O cystoscopy    FAMILY HISTORY: non contributory     Social History: [ current smoker  ] TOBACCO                  [ x ] ETOH                                 [ x ] IVDA/DRUGS    REVIEW OF SYSTEMS    General:	 x     Skin/Breast: x  	  Ophthalmologic:  x  	  ENMT:	x    Respiratory and Thorax: as above   	  Cardiovascular:	 x    Gastrointestinal:	 x    Genitourinary:	as above     Musculoskeletal:	 x    Neurological:	x    Psychiatric:	x    Hematology/Lymphatics:	 x    Endocrine:	 x    Allergic/Immunologic:	 x    MEDICATIONS  (STANDING):  budesonide 160 MICROgram(s)/formoterol 4.5 MICROgram(s) Inhaler 2 Puff(s) Inhalation two times a day  cyanocobalamin 1000 MICROGram(s) Oral daily  dextrose 5%. 1000 milliLiter(s) (50 mL/Hr) IV Continuous <Continuous>  dextrose 5%. 1000 milliLiter(s) (100 mL/Hr) IV Continuous <Continuous>  dextrose 50% Injectable 12.5 Gram(s) IV Push once  dextrose 50% Injectable 25 Gram(s) IV Push once  dextrose 50% Injectable 25 Gram(s) IV Push once  folic acid 1 milliGRAM(s) Oral daily  glucagon  Injectable 1 milliGRAM(s) IntraMuscular once  insulin lispro (ADMELOG) corrective regimen sliding scale   SubCutaneous three times a day before meals  losartan 25 milliGRAM(s) Oral daily  oxybutynin 5 milliGRAM(s) Oral three times a day  sodium chloride 0.45%. 1000 milliLiter(s) (75 mL/Hr) IV Continuous <Continuous>  tamsulosin 0.4 milliGRAM(s) Oral at bedtime    MEDICATIONS  (PRN):  acetaminophen     Tablet .. 650 milliGRAM(s) Oral every 6 hours PRN Temp greater or equal to 38C (100.4F), Mild Pain (1 - 3)  albuterol/ipratropium for Nebulization 3 milliLiter(s) Nebulizer every 6 hours PRN Shortness of Breath and/or Wheezing  dextrose Oral Gel 15 Gram(s) Oral once PRN Blood Glucose LESS THAN 70 milliGRAM(s)/deciliter  hydrALAZINE Injectable 10 milliGRAM(s) IV Push every 6 hours PRN SBP>155 and or DBP> 105    Vital Signs Last 24 Hrs  T(C): 37 (01 Feb 2024 12:13), Max: 37.2 (31 Jan 2024 18:26)  T(F): 98.6 (01 Feb 2024 12:13), Max: 98.9 (31 Jan 2024 18:26)  HR: 67 (01 Feb 2024 12:13) (59 - 67)  BP: 157/84 (01 Feb 2024 12:13) (132/80 - 167/76)  BP(mean): 117 (31 Jan 2024 15:15) (110 - 119)  RR: 18 (01 Feb 2024 12:13) (13 - 25)  SpO2: 92% (01 Feb 2024 12:13) (92% - 100%)    Parameters below as of 01 Feb 2024 12:13  Patient On (Oxygen Delivery Method): room air      I&O's Summary    31 Jan 2024 07:01  -  01 Feb 2024 07:00  --------------------------------------------------------  IN: 900 mL / OUT: 1550 mL / NET: -650 mL    01 Feb 2024 07:01  -  01 Feb 2024 12:40  --------------------------------------------------------  IN: 0 mL / OUT: 525 mL / NET: -525 mL    Physical Exam:   GENERAL: NAD  HEENT: RYLAN  ENMT: No tonsillar erythema, exudates, or enlargement  NECK: Supple, No JVD  CHEST/LUNG: Clear to auscultation bilaterally  CVS: Regular rate and rhythm  GI: : Soft, Nontender, Nondistended  NERVOUS SYSTEM:  Alert & Oriented X3  EXTREMITIES:  2+ Peripheral Pulses, No clubbing, cyanosis, or edema  SKIN: No rashes or lesions  PSYCH: Appropriate    Labs:  ABG - ( 31 Jan 2024 14:03 )  pH, Arterial: 7.23  pH, Blood: x     /  pCO2: 64    /  pO2: 146   / HCO3: 27    / Base Excess: -2.3  /  SaO2: 98.9            Venous<52<4>>53<<7.355>>Venous<<3><<4><<5<<539>>                            13.1   9.12  )-----------( 123      ( 01 Feb 2024 06:57 )             40.8                         14.2   14.58 )-----------( 156      ( 31 Jan 2024 16:55 )             44.6     02-01    142  |  101  |  17  ----------------------------<  125<H>  3.8   |  26  |  1.30  01-31    141  |  102  |  14  ----------------------------<  158<H>  4.3   |  25  |  1.26    Ca    8.5      01 Feb 2024 07:00  Ca    9.0      31 Jan 2024 16:55    TPro  7.9  /  Alb  4.1  /  TBili  0.4  /  DBili  x   /  AST  31  /  ALT  26  /  AlkPhos  86  01-31    CAPILLARY BLOOD GLUCOSE      POCT Blood Glucose.: 162 mg/dL (01 Feb 2024 12:17)  POCT Blood Glucose.: 131 mg/dL (01 Feb 2024 08:10)  POCT Blood Glucose.: 181 mg/dL (31 Jan 2024 21:48)  POCT Blood Glucose.: 155 mg/dL (31 Jan 2024 17:50)  POCT Blood Glucose.: 195 mg/dL (31 Jan 2024 13:15)    LIVER FUNCTIONS - ( 31 Jan 2024 16:55 )  Alb: 4.1 g/dL / Pro: 7.9 g/dL / ALK PHOS: 86 U/L / ALT: 26 U/L / AST: 31 U/L / GGT: x             Urinalysis Basic - ( 01 Feb 2024 07:00 )    Color: x / Appearance: x / SG: x / pH: x  Gluc: 125 mg/dL / Ketone: x  / Bili: x / Urobili: x   Blood: x / Protein: x / Nitrite: x   Leuk Esterase: x / RBC: x / WBC x   Sq Epi: x / Non Sq Epi: x / Bacteria: x    Lactate, Blood: 2.3 mmol/L (02-01 @ 09:56)    Studies  Chest X-RAY  < from: Xray Chest 1 View- PORTABLE-Urgent (Xray Chest 1 View- PORTABLE-Urgent .) (01.31.24 @ 15:56) >    INTERPRETATION:  EXAMINATION: XR CHEST URGENT    CLINICAL INDICATION: resp failure    TECHNIQUE: Single frontal, portable view of the chest was obtained.    COMPARISON: Chest x-ray 10/30/2023.    FINDINGS:    The heart is not accurately assessed in this AP projection.  No focal consolidations.  Bibasilar atelectasis.  There is no pneumothorax or pleural effusion.  No acute bony abnormality.    IMPRESSION:  No focal consolidations.    --- End of Report ---    < end of copied text >    < from: CT Abdomen and Pelvis Urogram w/wo IV Cont (12.18.23 @ 17:32) >    FINDINGS:  LOWER CHEST: Within normal limits.    LIVER: Morphologic changes of the liver with surface contour nodularity   suggesting cirrhosis. No focal hepatic masses.  Harman hepatis and   portacaval lymph nodes likely reactive in nature. Additional findings   suggesting portal hypertension.  BILE DUCTS: Normal caliber.  GALLBLADDER: Within normal limits.  SPLEEN: Within normal limits.  PANCREAS: Within normal limits.  ADRENALS: Within normal limits.  KIDNEYS/URETERS: Persistent mild to moderate left hydroureteronephrosis   to level the bladder. Again noted is a distal left ureteral calculus   measuring 0.6 cm. Enhancement of the distal most aspect of the left   urothelial wall may be reactive in nature versus a component of the left   posterior bladder wall thickening. Layering debris within the segment of   the mid to distal left ureter may represent hemorrhagic clot. No   enhancing mass identified within the mid to left distal ureter on current   study. No right hydronephrosis. No renal masses bilaterally. No   intrarenal calculi bilaterally. Right ureter is unremarkable.    BLADDER: Thickening of the posterior left bladder wall surrounding the   left ureteral vesicular junction, correlate with recent cystoscopy for   known transitional cell carcinoma.  REPRODUCTIVE ORGANS: Subcentimeter short axis measurement pelvic lymph   nodes.    BOWEL: No bowel obstruction. Appendix is normal. Gastric lipoma again   noted.  PERITONEUM: No ascites.  VESSELS: Atherosclerotic changes.  RETROPERITONEUM/LYMPH NODES: Subcentimeter short axis measurement   retroperitoneal lymph nodes.  ABDOMINAL WALL: Fat-containing umbilical hernia with small fat-containing   bilateral inguinal hernias. Atelectasis seen at the umbilicus suggesting   portal hypertension.  BONES: Degenerativechanges.    IMPRESSION:  Persistent left hydroureteronephrosis with a persistent distal left   ureteral calculus as well as thickening of the posterior left bladder   wall surrounding the left ureteral vesicular junction, correlate for   known transitional cell carcinoma.    Layering likely debris within the mid to distal left ureter without   enhancing mass discretely identified on current study.    Subcentimeter retroperitoneal and pelvic lymph nodes.    Cirrhotic liver with additional findingsof portal hypertension        --- End of Report ---    < end of copied text >                  
CHIEF COMPLAINT:    HISTORY OF PRESENT ILLNESS:  64 yo male presented to IT for Left PCN and ureteral stent placement. Ptn was admitted 10/28-11/2 at which time he presented w gross hematuria, was found to have a bladder tumor( resected via cystoscopy) and a LEFT UVJ stone, ptn was seen by  and has F/Jme and started treatment for bladder Ca. Ptn returned to the hospital today for Left PCN and Ureteral stent placement 2/2 hydro 2/2 stone. Dr Rahman is his urologist  Post procedure ptn noted to have CO2 retention and hypertensive urgency w . ptn states he has been compliant w his meds.  On the above mentioned admission ptn was also Dx w new onset DM, HTN. Obesity. CAROLINE  He was referred for a sleep study on outptn basis but hasnt done so bc has been busy w appnts re his newly diagnosed Ca.  Presently in the nephrostomy bag he has gross hematuria, he is on BIPAP, sleeping but arousable , Pain free, MS at baseline, A&Ox3. recognized me. he has no fever, no chills, no abd pain, no N/V  Denies any previous cardiac evaluation   Smokes a pack per day x 40 years     PAST MEDICAL & SURGICAL HISTORY:  HTN (hypertension)      DM2 (diabetes mellitus, type 2)      Bladder tumor      Obese      History of umbilical hernia      H/O transurethral resection of bladder tumor (TURBT)      H/O cystoscopy              MEDICATIONS:  hydrALAZINE Injectable 10 milliGRAM(s) IV Push every 6 hours PRN  losartan 25 milliGRAM(s) Oral daily        acetaminophen     Tablet .. 650 milliGRAM(s) Oral every 6 hours PRN      dextrose 50% Injectable 12.5 Gram(s) IV Push once  dextrose 50% Injectable 25 Gram(s) IV Push once  dextrose 50% Injectable 25 Gram(s) IV Push once  dextrose Oral Gel 15 Gram(s) Oral once PRN  glucagon  Injectable 1 milliGRAM(s) IntraMuscular once  insulin lispro (ADMELOG) corrective regimen sliding scale   SubCutaneous three times a day before meals    cyanocobalamin 1000 MICROGram(s) Oral daily  dextrose 5%. 1000 milliLiter(s) IV Continuous <Continuous>  dextrose 5%. 1000 milliLiter(s) IV Continuous <Continuous>  folic acid 1 milliGRAM(s) Oral daily  oxybutynin 5 milliGRAM(s) Oral three times a day  sodium chloride 0.45%. 1000 milliLiter(s) IV Continuous <Continuous>  tamsulosin 0.4 milliGRAM(s) Oral at bedtime      FAMILY HISTORY:      SOCIAL HISTORY:    [ ] Non-smoker  [ ] Smoker  [ ] Alcohol    Allergies    No Known Allergies    Intolerances    	    REVIEW OF SYSTEMS:  CONSTITUTIONAL: No fever, weight loss, + fatigue  EYES: No eye pain, visual disturbances, or discharge  ENMT:  No difficulty hearing, tinnitus, vertigo; No sinus or throat pain  NECK: No pain or stiffness  RESPIRATORY: No cough, wheezing, chills or hemoptysis; N+Shortness of Breath  CARDIOVASCULAR: No chest pain, palpitations, passing out, dizziness, or leg swelling  GASTROINTESTINAL: No abdominal or epigastric pain. No nausea, vomiting, or hematemesis; No diarrhea or constipation. No melena or hematochezia.  GENITOURINARY: No dysuria, frequency, hematuria, or incontinence  NEUROLOGICAL: No headaches, memory loss, loss of strength, numbness, or tremors  SKIN: No itching, burning, rashes, or lesions   LYMPH Nodes: No enlarged glands  ENDOCRINE: No heat or cold intolerance; No hair loss  MUSCULOSKELETAL: No joint pain or swelling; No muscle, back, or extremity pain  PSYCHIATRIC: No depression, anxiety, mood swings, or difficulty sleeping  HEME/LYMPH: No easy bruising, or bleeding gums  ALLERY AND IMMUNOLOGIC: No hives or eczema	    [ ] All others negative	  [ ] Unable to obtain    PHYSICAL EXAM:  T(C): 36.5 (02-01-24 @ 04:50), Max: 37.2 (01-31-24 @ 18:26)  HR: 59 (02-01-24 @ 04:50) (59 - 88)  BP: 167/76 (02-01-24 @ 04:50) (132/80 - 167/76)  RR: 18 (02-01-24 @ 04:50) (13 - 25)  SpO2: 97% (02-01-24 @ 04:50) (93% - 100%)  Wt(kg): --  I&O's Summary    31 Jan 2024 07:01  -  01 Feb 2024 07:00  --------------------------------------------------------  IN: 900 mL / OUT: 1550 mL / NET: -650 mL        Appearance: NAD  3 liters NC 	  HEENT:   Normal oral mucosa, PERRL, EOMI	  Lymphatic: No lymphadenopathy  Cardiovascular: Normal S1 S2, No JVD, No murmurs, No edema  Respiratory: Decreased bs   Psychiatry: A & O x 3, Mood & affect appropriate  Gastrointestinal:  Soft, Non-tender, + BS	  Skin: No rashes, No ecchymoses, No cyanosis	  Neurologic: Non-focal  Extremities: Normal range of motion, No clubbing, cyanosis or edema  Vascular: Peripheral pulses palpable 2+ bilaterally    TELEMETRY: 	    ECG:  	  RADIOLOGY:  < from: Xray Chest 1 View- PORTABLE-Urgent (Xray Chest 1 View- PORTABLE-Urgent .) (10.30.23 @ 23:40) >    ACC: [id number] EXAM:  XR CHEST PORTABLE URGENT 1V   ORDERED BY:  GAMALIEL NASH     PROCEDURE DATE:  10/30/2023          INTERPRETATION:  CLINICAL INFORMATION: Febrile    TECHNIQUE: Frontal view of the chest.    COMPARISON: None.    FINDINGS:    Heart: Heart size cannot be accurately assessed in this projection.  Pulmonary: No focal consolidation. Elevated left hemidiaphragm with left   lower lung linear atelectasis. No pneumothorax or pleural effusion.  Bones: No acute bony pathology.    IMPRESSION:  No focal consolidation.    Elevated left hemidiaphragm with left lower lung linear atelectasis.    --- End of Report ---      < end of copied text >  Pro-Brain Natriuretic Peptide: 286 pg/mL (01.31.24 @ 16:55)   OTHER: 	  	  LABS:	 	    CARDIAC MARKERS:        Blood Gas Profile - Arterial (01.31.24 @ 14:03)   pH, Arterial: 7.23: No collection time indicated, please interpret with caution  pCO2, Arterial: 64 mmHg  pO2, Arterial: 146 mmHg  HCO3, Arterial: 27 mmol/L  Base Excess, Arterial: -2.3 mmol/L  Oxygen Saturation, Arterial: 98.9 %  Total CO2, Arterial: 29 mmol/L  Blood Gas Source Arterial: Arterial    Blood Gas Profile - Arterial (01.31.24 @ 13:26)   pH, Arterial: 7.12: No collection time indicated, please interpret with caution  pCO2, Arterial: 93 mmHg  pO2, Arterial: 288 mmHg  HCO3, Arterial: 30 mmol/L  Base Excess, Arterial: -2.2 mmol/L  Oxygen Saturation, Arterial: 99.9 %  Total CO2, Arterial: 33 mmol/L  Blood Gas Source Arterial: Arterial                          13.1   9.12  )-----------( 123      ( 01 Feb 2024 06:57 )             40.8     02-01    142  |  101  |  17  ----------------------------<  125<H>  3.8   |  26  |  1.30    Ca    8.5      01 Feb 2024 07:00    TPro  7.9  /  Alb  4.1  /  TBili  0.4  /  DBili  x   /  AST  31  /  ALT  26  /  AlkPhos  86  01-31    proBNP:   Lipid Profile:   HgA1c:   TSH:

## 2024-02-01 NOTE — DISCHARGE NOTE PROVIDER - NSDCFUADDAPPT_GEN_ALL_CORE_FT
You have an appointment with IR on 2/15/2024 @ 1PM. Should you need to reschedule you may call the IR booking office @ [phone]. Please feel free to contact us at [phone] if any problems arise. After 6PM, Monday through Friday, on weekends and on holidays, please call [phone] and ask for the radiology resident on call to be paged.  You have an appointment with IR on 2/15/2024 @ 1PM. Should you need to reschedule you may call the IR booking office @ [phone]. Please feel free to contact us at [phone] if any problems arise. After 6PM, Monday through Friday, on weekends and on holidays, please call [phone] and ask for the radiology resident on call to be paged.    You  have an appt. with Dr. Sifuentes on 3/18 @12pm

## 2024-02-01 NOTE — CONSULT NOTE ADULT - ASSESSMENT
64 y/o M with PMH of obesity, HTN, DM, bladder tumor s/p TURBT, umbilical hernia. Recent admission 10/28-11/2 for hematuria found to have bladder tumor (now s/p resection) and L UVJ stone. Presents to IR for L PCN and ureteral stent placement 2nd to hydronephrosis. Post procedure pt noted to have hypertensive urgency, acute hypercarbic respiratory failure requiring Bipap. Pulmonary called to further evaluation. 
 62 yo male presented to IT for Left PCN and ureteral stent placement. Ptn was admitted 10/28-11/2 at which time he presented w gross hematuria, was found to have a bladder tumor( resected via cystoscopy) and a LEFT UVJ stone, ptn was seen by  and has F/Usince and started treatment for bladder Ca. Ptn returned to the hospital today for Left PCN and Ureteral stent placement 2/2 hydro 2/2 stone. Dr Rahman is his urologist  Post procedure ptn noted to have CO2 retention and hypertensive urgency w . ptn states he has been compliant w his meds.  On the above mentioned admission ptn was also Dx w new onset DM, HTN. Obesity. CAROLINE  He was referred for a sleep study on outptn basis but hasnt done so bc has been busy w appnts re his newly diagnosed Ca.  Presently in the nephrostomy bag he has gross hematuria, he is on BIPAP, sleeping but arousable , Pain free, MS at baseline, A&Ox3. recognized me. he has no fever, no chills, no abd pain, no N/V  Denies any previous cardiac evaluation   Smokes a pack per day x 40 years   
63-year-old male with bladder cancer. Care per urology.    Iron deficiency due to hematuria. He takes oral iron as out-pt. Hgb normal. Continue oral iron as outpt.    Thrombocytopenia is mild and at this time would just monitor. He has had this in the past. Could be ITP. He may have cirrhosis based upon imaging and it cold be due to that and ETOH.

## 2024-02-01 NOTE — DISCHARGE NOTE PROVIDER - CARE PROVIDER_API CALL
Jose Khan  [facility]  [address] [place] [address]  Phone: [phone]  Fax: [phone]  Scheduled Appointment: 02/15/2024 01:00 PM

## 2024-02-01 NOTE — DISCHARGE NOTE PROVIDER - NSDCCPCAREPLAN_GEN_ALL_CORE_FT
PRINCIPAL DISCHARGE DIAGNOSIS  Diagnosis: Bladder tumor  Assessment and Plan of Treatment: Left PCN and ureteral stent placement. Ptn was admitted 10/28-11/2 at which time he presented w gross hematuria, was found to have a bladder tumor (resected via cystoscopy) and a LEFT UVJ stone, ptn was seen by  and has F/U since and started treatment for bladder Ca. Ptn returned on 1/31 for Left PCN and Ureteral stent placement 2/2 hydro 2/2 stone and follows with Dr Rahman is his urologist.      SECONDARY DISCHARGE DIAGNOSES  Diagnosis: Acute respiratory failure with hypercapnia  Assessment and Plan of Treatment: Hypercarbic resp. failure  - s/p procedure ptn noted to have CO2 retention and hypertensive urgency w . ptn states he has been compliant w his meds.  Pt. placed on BIPAP with good results and seen and followed by pulmonology, likely chronic/COPD in setting of long standing tobacco use/OHS/AMBREEN    Diagnosis: SOB (shortness of breath)  Assessment and Plan of Treatment: Ischemic w/u as an out-pt    Diagnosis: DM2 (diabetes mellitus, type 2)  Assessment and Plan of Treatment: DM2 (diabetes mellitus, type 2). RISS.    Diagnosis: Tobacco use disorder  Assessment and Plan of Treatment: Tobacco use disorder, cessation counseling.

## 2024-02-01 NOTE — DISCHARGE NOTE PROVIDER - NSDCCAREPROVSEEN_GEN_ALL_CORE_FT
Vandana, Carmel Ni, Orlin Gaffney, Liset Redman, Cedric Clayton, Laerika Sifuentes, Shayy Ortiz, Rishi CRUZ

## 2024-02-01 NOTE — CONSULT NOTE ADULT - CONSULT REQUESTED BY NAME
[FreeTextEntry1] : She had an upper respiratory tract infection in December.  She was treated with a Z-Alberto.  She has had persistent nasal congestion with heaviness and sinus pressure.  She had a little bit of bleeding from her nose.  It appears to be coming from the left anterior nasal septum.  Nasal endoscopy did not show any purulent drainage or congestion.  There was a little bit of clear mucus in the left middle meatus which was cultured.  -Plan -Findings and management options were discussed with the patient. -Humidifier -Moisturizing nasal gel -Avoid strenuous activity, nasal manipulation, and nose blowing -She will hold off on using nasal steroid spray for now.  She may try 1 if she does not have further bleeding. -We will hold off on cauterization for the nosebleeds for now since they are very mild.   -She may use Breathe Right strips -I asked her to call for the culture results.  If it is positive I will place her on antibiotics.  Also I gave her Augmentin to take should her symptoms worsen. -We will consider CT scan of the sinuses if she is not improving -I will see how she is feeling when she calls for the culture results.  We will discuss follow-up at that point -She should call earlier if she has any problems
Dr. Reed
DR. Reed
Dr. woody

## 2024-02-01 NOTE — PROGRESS NOTE ADULT - ASSESSMENT
62 yo male who is my office ptn presented to IT for Left PCN and ureteral stent placement. Ptn was admitted 10/28-11/2 at which time he presented w gross hematuria, was found to have a bladder tumor( resected via cystoscopy) and a LEFT UVJ stone, ptn was seen by  and has F/Usince and started treatment for bladder Ca. Ptn returned to the hospital today for Left PCN and Ureteral stent placement 2/2 hydro 2/2 stone. Dr Rahman is his urologist  Post procedure ptn noted to have CO2 retention and hypertensive urgency w . ptn states he has been compliant w his meds.  On the above mentioned admission ptn was also Dx w new onset DM, HTN. Obesity. CAROLINE  He was referred for a sleep study on outptn basis but hasnt done so bc has been busy w appnts re his newly diagnosed Ca.  Presently in the nephrostomy bag he has gross hematuria, he is on BIPAP, sleeping but arousable , Pain free, MS at baseline, A&Ox3. recognized me. he has no fever, no chills, no abd pain, no N/V    3/31: 4 hours later ptn was taken off BIPAP, awake, alert saturating well on RA at 94%, will rpt VBG. pulm consult placed re if should have BIPAP overnight.   BP is stable at 144/79. will cont outptn Losartan.  2/1: IR F/U reveiwed, PCN capped, ptn feels well, will have IR F/U in 2 weeks  referral for outptn Card and Pulm given    Insulin on a sliding scale, resume Janumet at DC  dvt ppx w SCD  dc home today

## 2024-02-01 NOTE — CONSULT NOTE ADULT - NS ATTEND AMEND GEN_ALL_CORE FT
pt seen and examined:  agree with above:  he said he could not breath as he was lying on his stomach : he refused for bipap : but today's however venous ph id fi ne today even without bipap:  adv strongly to stop smoking: He is also advised to get PSG as an outpt and PFT

## 2024-02-01 NOTE — CONSULT NOTE ADULT - PROBLEM SELECTOR RECOMMENDATION 2
As above   will need full ischemic workup as outpt
-Current 1/2 ppd smoker  -Smoking cessation education provided, pt endorses desire to quit  -Suggest eventual outpatient PFTS  -Bronchodilators as above.

## 2024-02-01 NOTE — CONSULT NOTE ADULT - PROBLEM SELECTOR RECOMMENDATION 9
Likely chronic/COPD in setting of long standing tobacco use/OHS/AMBREEN  supplemental oxygen   Pulm eval

## 2024-02-01 NOTE — PROGRESS NOTE ADULT - ASSESSMENT
Assessment/Plan:  63y Male admitted with left obstructive uropathy s/p left pcn and ureteral stent placement in IR on 1/31/24. Post op patient hypertensive and with hypercapnic respiratory failure requiring admission for observation. Patient doing well, urine color improving.     -continue global management per primary team  -trend vs/labs  -left PCN to gravity bag; monitor urine color  -flush drain with 5cc NS daily  -change dressing q3 days or when dressing is saturated  -will reassess and likely plan to cap PCN later this afternoon, for capping trial prior to discharge home  -pending capping trial will follow-up with IR for tube check and possible PCN removal in 1-2 weeks; they can make an appointment with IR by calling the IR booking office at [phone]  -they will benefit from VNS service to help with drainage catheter care; they should continue same drainage catheter care as an outpatient.     Please call IR at extension [phone] with any questions, concerns, or issues regarding above.

## 2024-02-01 NOTE — DISCHARGE NOTE PROVIDER - NSDCCPTREATMENT_GEN_ALL_CORE_FT
PRINCIPAL PROCEDURE  Procedure: Percutaneous left nephrostomy  Findings and Treatment: You have had a Left nephrostomy tube placed by Dr. Khan on 1/24/2024  in [facility] (IR).   Your nephrostomy tube was capped 2/1/2024.   Monitor site for any sign or symptoms of infection (painful red skin, green/ yellow foul smelling discharge from the insertion site, fever, chills, leakage around drain site).   Dressing care:  -Wash hands thoroughly with water and soap for at least 20 seconds.   -take off old dressing and clean around drain site with gauze soaked with warm water  -After cleaning the site, dry and replace dressing with a new gauze and tegaderm.   -Place one piece of gauze underneath the drain and another piece of gauze on top of drain.   -Apply tegaderm (clear dressing) on top.  -Change dressing every 3 days or when soiled  **No flushing is necessary unless tube reconnected to drainage bag- TUBE DOES NOT NEED TO BE FLUSHED WHILE CAPPED **  Drain care:  -Disconnect tubing (tube attached to bag/ bulb)  from the catheter (catheter going into skin)  -Clean catheter with alcohol swab  -Twist on the flush syringe to the catheter (be sure to push the air out of syringe)  -Flush catheter with 5mL (DO NOT pull back on syringe). If you meet resistance upon flushing, STOP and contact IR.   -Disconnect syringe from catheter and reconnect drainage bag or bulb.  Please feel free to contact us at [phone] if any problems arise. After 6PM, Monday through Friday, on weekends and on holidays, please call [phone] and ask for the radiology resident on call to be paged.

## 2024-02-01 NOTE — DISCHARGE NOTE PROVIDER - HOSPITAL COURSE
HPI:  64 yo male who is my office ptn presented to IT for Left PCN and ureteral stent placement. Ptn was admitted 10/28-11/2 at which time he presented w gross hematuria, was found to have a bladder tumor( resected via cystoscopy) and a LEFT UVJ stone, ptn was seen by  and has F/U since and started treatment for bladder Ca. Ptn returned to the hospital today for Left PCN and Ureteral stent placement 2/2 hydro 2/2 stone. Dr Rahman is his urologist  Post procedure ptn noted to have CO2 retention and hypertensive urgency w . ptn states he has been compliant w his meds.  On the above mentioned admission ptn was also Dx w new onset DM, HTN. Obesity. CAROLINE  He was referred for a sleep study on outptn basis but hasnt done so bc has been busy w appnts re his newly diagnosed Ca.  Presently in the nephrostomy bag he has gross hematuria, he is on BIPAP, sleeping but arousable , Pain free, MS at baseline, A&Ox3. recognized me. he has no fever, no chills, no abd pain, no N/V   (31 Jan 2024 15:28)    Hospital Course: presented for Left PCN and ureteral stent placement. Ptn was admitted 10/28-11/2 at which time he presented w gross hematuria, was found to have a bladder tumor (resected via cystoscopy) and a LEFT UVJ stone, ptn was seen by  and has F/U since and started treatment for bladder Ca. Ptn returned on 1/31 for Left PCN and Ureteral stent placement 2/2 hydro 2/2 stone and follows with Dr Rahman is his urologist    Hypercarbic resp. failure  - s/p procedure ptn noted to have CO2 retention and hypertensive urgency w . ptn states he has been compliant w his meds.  Pt. placed on BIPAP with good results and seen and followed by pulmonoligy     Iron deficiency due to hematuria. Seen and followed by heme; takes oral iron as out-pt. Hgb normal. Continue oral iron as outpt. Thrombocytopenia is mild and at this time would just monitor. He has had this in the past. Could be ITP. He may have cirrhosis based upon imaging and it cold be due to that and ETOH.  SOB (shortness of breath). As above and will need full ischemic workup as outpt.    DM2 (diabetes mellitus, type 2). RISS.    Bladder tumor, s/p ureteral stent   urology follow up.    Tobacco use disorder, cessation counseling.      Important Medication Changes and Reason:    Active or Pending Issues Requiring Follow-up:    Advanced Directives:   [ ] Full code  [ ] DNR  [ ] Hospice    Discharge Diagnoses:

## 2024-02-01 NOTE — DISCHARGE NOTE PROVIDER - NSDCMRMEDTOKEN_GEN_ALL_CORE_FT
aspirin 81 mg oral delayed release tablet: 1 tab(s) orally once a day  cyanocobalamin 1000 mcg oral tablet: 1 tab(s) orally once a day  folic acid 1 mg oral tablet: 1 tab(s) orally once a day  Janumet 50 mg-1000 mg oral tablet: 1 tab(s) orally 2 times a day  losartan 25 mg oral tablet: 1 tab(s) orally once a day  oxyBUTYnin 5 mg oral tablet: 1 tab(s) orally 3 times a day  tamsulosin 0.4 mg oral capsule: 1 cap(s) orally once a day (at bedtime)  Tylenol 325 mg oral tablet: 2 tab(s) orally as needed for pain   budesonide-formoterol 160 mcg-4.5 mcg/inh inhalation aerosol: 1 puff(s) inhaled 2 times a day as directed  cyanocobalamin 1000 mcg oral tablet: 1 tab(s) orally once a day  folic acid 1 mg oral tablet: 1 tab(s) orally once a day  Janumet 50 mg-1000 mg oral tablet: 1 tab(s) orally 2 times a day  losartan 25 mg oral tablet: 1 tab(s) orally once a day  oxyBUTYnin 5 mg oral tablet: 1 tab(s) orally 3 times a day  tamsulosin 0.4 mg oral capsule: 1 cap(s) orally once a day (at bedtime)  Tylenol 325 mg oral tablet: 2 tab(s) orally as needed for pain

## 2024-02-05 ENCOUNTER — APPOINTMENT (OUTPATIENT)
Dept: UROLOGY | Facility: CLINIC | Age: 64
End: 2024-02-05
Payer: COMMERCIAL

## 2024-02-05 VITALS
SYSTOLIC BLOOD PRESSURE: 183 MMHG | OXYGEN SATURATION: 98 % | DIASTOLIC BLOOD PRESSURE: 131 MMHG | RESPIRATION RATE: 16 BRPM | HEART RATE: 59 BPM

## 2024-02-05 DIAGNOSIS — C67.0 MALIGNANT NEOPLASM OF TRIGONE OF BLADDER: ICD-10-CM

## 2024-02-05 PROCEDURE — G2211 COMPLEX E/M VISIT ADD ON: CPT | Mod: NC,1L

## 2024-02-05 PROCEDURE — 81003 URINALYSIS AUTO W/O SCOPE: CPT | Mod: QW

## 2024-02-05 PROCEDURE — 99214 OFFICE O/P EST MOD 30 MIN: CPT

## 2024-02-05 NOTE — PHYSICAL EXAM
[General Appearance - Well Developed] : well developed [General Appearance - Well Nourished] : well nourished [General Appearance - In No Acute Distress] : no acute distress [Edema] : no peripheral edema [Exaggerated Use Of Accessory Muscles For Inspiration] : no accessory muscle use [Abdomen Soft] : soft [Abdomen Tenderness] : non-tender [Costovertebral Angle Tenderness] : no ~M costovertebral angle tenderness [FreeTextEntry1] : L PCNU in place and capped.  [Normal Station and Gait] : the gait and station were normal for the patient's age [No Focal Deficits] : no focal deficits [Oriented To Time, Place, And Person] : oriented to person, place, and time [Urethral Meatus] : meatus normal [Penis Abnormality] : normal circumcised penis

## 2024-02-05 NOTE — HISTORY OF PRESENT ILLNESS
[FreeTextEntry1] : 64yo gentleman with cc bladder cancer.   pt was admitted to hospital 10/28 with gross hematuria. had minimal prior medical care. Pt had fisher placed and CBI was started. he was noted to be hyperglycemic (hgb a1c=11) and anemic. On CT noted to have clot vs mass in bladder. Had L hydro with small probable distal ureteral stone. Pt failed to wean from CBI and was taken to the OR where large clot was noted as well as large bladder tumor along trigone and L bladder floor obscuring L UO. Underwent reresection with path all benign. Unable to see L UO. Current smoker 1/2 ppd. 30 pack years. Maternal aunt with hx of bladder ca s/p cystectomy. Paternal aunt with bladder ca as well.   Had repeat CT that shows continued L hydro down to level of bladder. Punctate stone still note. Pt was sent to IR for PCNU. Had placement last week. reviewed imaging. No distal pictures available to see if stone/filling defect found. No report yet.   Pt comes in for BCG tx today. Notes bothersome sx this week with dysuria particularly at the end of his void. Some L flank pain as well. No fever or chills. No hematruia.   Urine dip in office today with blood and protein (3+ each) and trace LE.

## 2024-02-05 NOTE — ASSESSMENT
[FreeTextEntry1] : HG T1 TCC s/p TURBT x2. Induction BCG in progress. New bothersome urinary sx. Suspect related to stent but need to r/o infection prior to administration. UDip with blood (precent procedure) and only roxanna QUINTERO.  --UA, UCx. will empirically begin abx tomorrow if WBCs are high --BCG next monday --Post tx, likely OR for bladder bx and L ureteroscopy.

## 2024-02-12 ENCOUNTER — APPOINTMENT (OUTPATIENT)
Dept: UROLOGY | Facility: CLINIC | Age: 64
End: 2024-02-12

## 2024-02-13 LAB
APPEARANCE: ABNORMAL
BACTERIA UR CULT: NORMAL
BACTERIA: NEGATIVE /HPF
BILIRUBIN URINE: NEGATIVE
BLOOD URINE: ABNORMAL
CAST: 2 /LPF
COLOR: NORMAL
EPITHELIAL CELLS: 6 /HPF
GLUCOSE QUALITATIVE U: NEGATIVE MG/DL
HYALINE CASTS: PRESENT
KETONES URINE: NEGATIVE MG/DL
LEUKOCYTE ESTERASE URINE: ABNORMAL
MICROSCOPIC-UA: NORMAL
NITRITE URINE: NEGATIVE
PH URINE: 6
PROTEIN URINE: 300 MG/DL
RED BLOOD CELLS URINE: 1195 /HPF
REVIEW: NORMAL
SPECIFIC GRAVITY URINE: 1.02
TRANSITIONAL EPITHELIAL CELLS: PRESENT
UROBILINOGEN URINE: 1 MG/DL
WHITE BLOOD CELLS URINE: 55 /HPF

## 2024-02-15 ENCOUNTER — RESULT REVIEW (OUTPATIENT)
Age: 64
End: 2024-02-15

## 2024-02-15 ENCOUNTER — OUTPATIENT (OUTPATIENT)
Dept: OUTPATIENT SERVICES | Facility: HOSPITAL | Age: 64
LOS: 1 days | End: 2024-02-15
Payer: COMMERCIAL

## 2024-02-15 ENCOUNTER — TRANSCRIPTION ENCOUNTER (OUTPATIENT)
Age: 64
End: 2024-02-15

## 2024-02-15 VITALS
WEIGHT: 229.94 LBS | SYSTOLIC BLOOD PRESSURE: 195 MMHG | RESPIRATION RATE: 18 BRPM | HEIGHT: 69 IN | DIASTOLIC BLOOD PRESSURE: 113 MMHG | OXYGEN SATURATION: 95 % | TEMPERATURE: 98 F | HEART RATE: 66 BPM

## 2024-02-15 DIAGNOSIS — N13.9 OBSTRUCTIVE AND REFLUX UROPATHY, UNSPECIFIED: ICD-10-CM

## 2024-02-15 DIAGNOSIS — Z98.890 OTHER SPECIFIED POSTPROCEDURAL STATES: Chronic | ICD-10-CM

## 2024-02-15 PROCEDURE — 50389 REMOVE RENAL TUBE W/FLUORO: CPT

## 2024-02-15 PROCEDURE — 50389 REMOVE RENAL TUBE W/FLUORO: CPT | Mod: LT

## 2024-02-15 PROCEDURE — C1769: CPT

## 2024-02-15 NOTE — PROCEDURE NOTE - PLAN
Pt informed that if he were to develop back pain, fevers, chills, nausea, vomiting to immediately present ER and call IR office.

## 2024-02-15 NOTE — ASU PATIENT PROFILE, ADULT - FALL HARM RISK - UNIVERSAL INTERVENTIONS
Bed in lowest position, wheels locked, appropriate side rails in place/Call bell, personal items and telephone in reach/Instruct patient to call for assistance before getting out of bed or chair/Non-slip footwear when patient is out of bed/[place] to call system/Physically safe environment - no spills, clutter or unnecessary equipment/Purposeful Proactive Rounding/Room/bathroom lighting operational, light cord in reach

## 2024-02-15 NOTE — PRE PROCEDURE NOTE - PRE PROCEDURE EVALUATION
Interventional Radiology    HPI: 63y Male who was recently admitted for gross hematuria and found to have a bladder tumor (resected via cystoscopy) and left UVJ stone with left obstructive uropathy s/p left pcn and ureteral stent placement in IR on 1/31/24.  Pt L covering PCN was capped on admission and presents today for evaluation and possible removal.    Of note, the patient states that he experiences urinary discomfort however urologist believe it is secondary to the stent and not concerned for infection. Recent UCx negative.     Allergies: No Known Allergies    Medications (Abx/Cardiac/Anticoagulation/Blood Products)  cyanocobalamin 1000 mcg oral tablet: 1 tab(s) orally once a day (15 Feb 2024 13:00)  Tylenol 325 mg oral tablet: 2 tab(s) orally as needed for pain (15 Feb 2024 13:00)    Data:  175.3  104.3  T(C): 36.6  HR: 66  BP: 195/113  RR: 18  SpO2: 95%    Exam  General: No acute distress  Chest: Non labored breathing  Abdomen: Non-distended    Plan: 63y Male presents for left PCN evaluation and possible removal.    -Risks/Benefits/alternatives explained with the patient and/or healthcare proxy and witnessed informed consent obtained.

## 2024-02-15 NOTE — PROCEDURE NOTE - PROCEDURE FINDINGS AND DETAILS
Initial fluoroscopic imaging demonstrated pigtail catheter in similar position compared to prior study. Hand injection of contrast demonstrated opacification of the pelvicalyceal system, ureter and bladder. Left nephrostomy tube was removed over guidewire. Dry, clean, sterile dressing applied. Full report to follow.

## 2024-02-15 NOTE — ASU DISCHARGE PLAN (ADULT/PEDIATRIC) - NURSING INSTRUCTIONS
Please feel free to contact us at [phone] if any problems arise. After 6PM, Monday through Friday, on weekends and on holidays, please call [phone] and ask for the radiology resident on call to be paged.

## 2024-02-15 NOTE — ASU DISCHARGE PLAN (ADULT/PEDIATRIC) - ASU DC SPECIAL INSTRUCTIONSFT
Drain Check    Discharge Instructions  - You have had a drain checked.  - Keep the area clean and dry.  - Do not soak in a tub or pool with the drain, however you may shower with the drain and dressing covered in plastic wrap.  - Do not put traction on the drain and be careful that the drain does not get accidentally dislodged or kinked.  - Record output daily from the drain. Empty the bag as needed.  - You may resume your normal diet.  - You may resume your normal medications.  - It is normal to experience some pain over the site for the next few days. You may take apply ice to the area (20 minutes on, 20 minutes off) and take Tylenol for that pain. Do not take more frequently than every 6 hours and do not exceed more than 3000mg of Tylenol in a 24 hour period.    Notify your primary physician and/or Interventional Radiology IMMEDIATELY if you experience any of the following       - Fever of 100.4F  or 38C       - Chills or Rigors/ Shakes       - Swelling and/or Redness in the area of the puncture site       - Worsening Pain       - Blood soaked bandages or worsening bleeding       - Lightheadedness and/or dizziness upon standing       - Chest Pain/ Tightness       - Shortness of Breath       - Difficulty walking    If you have a problem that you believe requires IMMEDIATE attention, please go to your NEAREST Emergency Room. If you believe your problem can safely wait until you speak to a physician, please call Interventional Radiology for any concerns.    During Normal Weekday Business Hours- You can contact the Interventional Radiology department during normal business hours via telephone.  During Evenings and Weekends- If you need to contact Interventional Radiology during off hours, do so by calling the hospital and requesting to be connected to the Interventional Radiologist on call. Drain removal    Discharge Instructions  - You have had a drain removal.  - Keep the area clean and dry.  - Do not soak in a tub or pool with the drain, however you may shower with the drain and dressing covered in plastic wrap.  - You may resume your normal diet.  - You may resume your normal medications.  - It is normal to experience some pain over the site for the next few days. You may take apply ice to the area (20 minutes on, 20 minutes off) and take Tylenol for that pain. Do not take more frequently than every 6 hours and do not exceed more than 3000mg of Tylenol in a 24 hour period.    Notify your primary physician and/or Interventional Radiology IMMEDIATELY if you experience any of the following       - Fever of 100.4F  or 38C       - Chills or Rigors/ Shakes       - Swelling and/or Redness in the area of the puncture site       - Worsening Pain       - Blood soaked bandages or worsening bleeding       - Lightheadedness and/or dizziness upon standing       - Chest Pain/ Tightness       - Shortness of Breath       - Difficulty walking    If you have a problem that you believe requires IMMEDIATE attention, please go to your NEAREST Emergency Room. If you believe your problem can safely wait until you speak to a physician, please call Interventional Radiology for any concerns.    During Normal Weekday Business Hours- You can contact the Interventional Radiology department during normal business hours via telephone.  During Evenings and Weekends- If you need to contact Interventional Radiology during off hours, do so by calling the hospital and requesting to be connected to the Interventional Radiologist on call. left forehead; 4cm, with ~2cm jagged/v-shaped laceration

## 2024-02-16 ENCOUNTER — APPOINTMENT (OUTPATIENT)
Dept: UROLOGY | Facility: CLINIC | Age: 64
End: 2024-02-16
Payer: COMMERCIAL

## 2024-02-16 ENCOUNTER — OUTPATIENT (OUTPATIENT)
Dept: OUTPATIENT SERVICES | Facility: HOSPITAL | Age: 64
LOS: 1 days | End: 2024-02-16
Payer: COMMERCIAL

## 2024-02-16 VITALS
HEART RATE: 68 BPM | OXYGEN SATURATION: 98 % | SYSTOLIC BLOOD PRESSURE: 197 MMHG | RESPIRATION RATE: 16 BRPM | DIASTOLIC BLOOD PRESSURE: 86 MMHG

## 2024-02-16 DIAGNOSIS — Z98.890 OTHER SPECIFIED POSTPROCEDURAL STATES: Chronic | ICD-10-CM

## 2024-02-16 DIAGNOSIS — R35.0 FREQUENCY OF MICTURITION: ICD-10-CM

## 2024-02-16 PROCEDURE — 51720 TREATMENT OF BLADDER LESION: CPT

## 2024-02-16 RX ORDER — BACILLUS CALMETTE-GUERIN 50 MG/50ML
50 POWDER, FOR SUSPENSION INTRAVESICAL
Refills: 0 | Status: COMPLETED | OUTPATIENT
Start: 2024-02-16

## 2024-02-16 RX ADMIN — BACILLUS CALMETTE-GUERIN 0 MG: 50 POWDER, FOR SUSPENSION INTRAVESICAL at 00:00

## 2024-02-21 DIAGNOSIS — Z85.51 PERSONAL HISTORY OF MALIGNANT NEOPLASM OF BLADDER: ICD-10-CM

## 2024-02-21 DIAGNOSIS — Z46.6 ENCOUNTER FOR FITTING AND ADJUSTMENT OF URINARY DEVICE: ICD-10-CM

## 2024-02-21 LAB
APPEARANCE: CLEAR
BACTERIA UR CULT: NORMAL
BACTERIA: NEGATIVE /HPF
BILIRUBIN URINE: NEGATIVE
BLOOD URINE: ABNORMAL
CAST: 2 /LPF
COLOR: YELLOW
EPITHELIAL CELLS: 2 /HPF
GLUCOSE QUALITATIVE U: NEGATIVE MG/DL
KETONES URINE: NEGATIVE MG/DL
LEUKOCYTE ESTERASE URINE: ABNORMAL
MICROSCOPIC-UA: NORMAL
NITRITE URINE: NEGATIVE
PH URINE: 7
PROTEIN URINE: 100 MG/DL
RED BLOOD CELLS URINE: 97 /HPF
SPECIFIC GRAVITY URINE: 1.02
UROBILINOGEN URINE: 1 MG/DL
WHITE BLOOD CELLS URINE: 15 /HPF

## 2024-02-23 ENCOUNTER — OUTPATIENT (OUTPATIENT)
Dept: OUTPATIENT SERVICES | Facility: HOSPITAL | Age: 64
LOS: 1 days | End: 2024-02-23
Payer: COMMERCIAL

## 2024-02-23 ENCOUNTER — APPOINTMENT (OUTPATIENT)
Dept: UROLOGY | Facility: CLINIC | Age: 64
End: 2024-02-23
Payer: COMMERCIAL

## 2024-02-23 VITALS — DIASTOLIC BLOOD PRESSURE: 111 MMHG | SYSTOLIC BLOOD PRESSURE: 171 MMHG | HEART RATE: 79 BPM | OXYGEN SATURATION: 95 %

## 2024-02-23 VITALS — DIASTOLIC BLOOD PRESSURE: 124 MMHG | SYSTOLIC BLOOD PRESSURE: 173 MMHG | HEART RATE: 75 BPM

## 2024-02-23 DIAGNOSIS — Z98.890 OTHER SPECIFIED POSTPROCEDURAL STATES: Chronic | ICD-10-CM

## 2024-02-23 DIAGNOSIS — C67.0 MALIGNANT NEOPLASM OF TRIGONE OF BLADDER: ICD-10-CM

## 2024-02-23 DIAGNOSIS — R35.0 FREQUENCY OF MICTURITION: ICD-10-CM

## 2024-02-23 DIAGNOSIS — N13.30 UNSPECIFIED HYDRONEPHROSIS: ICD-10-CM

## 2024-02-23 PROCEDURE — 51720 TREATMENT OF BLADDER LESION: CPT

## 2024-02-23 RX ORDER — BACILLUS CALMETTE-GUERIN 50 MG/50ML
50 POWDER, FOR SUSPENSION INTRAVESICAL
Refills: 0 | Status: COMPLETED | OUTPATIENT
Start: 2024-02-23

## 2024-02-23 RX ORDER — BACILLUS CALMETTE-GUERIN 50 MG/50ML
50 POWDER, FOR SUSPENSION INTRAVESICAL
Qty: 1 | Refills: 0 | Status: COMPLETED | OUTPATIENT
Start: 2024-02-23 | End: 2024-02-23

## 2024-02-23 RX ORDER — BACILLUS CALMETTE-GUERIN 50 MG/50ML
50 POWDER, FOR SUSPENSION INTRAVESICAL
Qty: 1 | Refills: 0 | Status: COMPLETED | OUTPATIENT
Start: 2024-02-16 | End: 2024-02-16

## 2024-02-23 RX ORDER — BACILLUS CALMETTE-GUERIN 50 MG/50ML
50 POWDER, FOR SUSPENSION INTRAVESICAL
Qty: 1 | Refills: 0 | Status: DISCONTINUED | OUTPATIENT
Start: 2024-02-05 | End: 2024-02-23

## 2024-02-23 RX ADMIN — BACILLUS CALMETTE-GUERIN 0 MG: 50 POWDER, FOR SUSPENSION INTRAVESICAL at 00:00

## 2024-02-28 DIAGNOSIS — D49.4 NEOPLASM OF UNSPECIFIED BEHAVIOR OF BLADDER: ICD-10-CM

## 2024-02-29 DIAGNOSIS — C67.0 MALIGNANT NEOPLASM OF TRIGONE OF BLADDER: ICD-10-CM

## 2024-03-05 ENCOUNTER — OUTPATIENT (OUTPATIENT)
Dept: OUTPATIENT SERVICES | Facility: HOSPITAL | Age: 64
LOS: 1 days | End: 2024-03-05
Payer: COMMERCIAL

## 2024-03-05 VITALS
OXYGEN SATURATION: 96 % | RESPIRATION RATE: 16 BRPM | SYSTOLIC BLOOD PRESSURE: 161 MMHG | WEIGHT: 229.94 LBS | HEIGHT: 67.5 IN | TEMPERATURE: 98 F | HEART RATE: 67 BPM | DIASTOLIC BLOOD PRESSURE: 91 MMHG

## 2024-03-05 DIAGNOSIS — N20.1 CALCULUS OF URETER: ICD-10-CM

## 2024-03-05 DIAGNOSIS — E11.9 TYPE 2 DIABETES MELLITUS WITHOUT COMPLICATIONS: ICD-10-CM

## 2024-03-05 DIAGNOSIS — N13.30 UNSPECIFIED HYDRONEPHROSIS: ICD-10-CM

## 2024-03-05 DIAGNOSIS — G47.33 OBSTRUCTIVE SLEEP APNEA (ADULT) (PEDIATRIC): ICD-10-CM

## 2024-03-05 DIAGNOSIS — Z98.890 OTHER SPECIFIED POSTPROCEDURAL STATES: Chronic | ICD-10-CM

## 2024-03-05 DIAGNOSIS — Z01.818 ENCOUNTER FOR OTHER PREPROCEDURAL EXAMINATION: ICD-10-CM

## 2024-03-05 DIAGNOSIS — I10 ESSENTIAL (PRIMARY) HYPERTENSION: ICD-10-CM

## 2024-03-05 LAB
A1C WITH ESTIMATED AVERAGE GLUCOSE RESULT: 6.6 % — HIGH (ref 4–5.6)
ANION GAP SERPL CALC-SCNC: 11 MMOL/L — SIGNIFICANT CHANGE UP (ref 5–17)
BUN SERPL-MCNC: 21 MG/DL — SIGNIFICANT CHANGE UP (ref 7–23)
CALCIUM SERPL-MCNC: 9.7 MG/DL — SIGNIFICANT CHANGE UP (ref 8.4–10.5)
CHLORIDE SERPL-SCNC: 100 MMOL/L — SIGNIFICANT CHANGE UP (ref 96–108)
CO2 SERPL-SCNC: 28 MMOL/L — SIGNIFICANT CHANGE UP (ref 22–31)
CREAT SERPL-MCNC: 1.1 MG/DL — SIGNIFICANT CHANGE UP (ref 0.5–1.3)
EGFR: 76 ML/MIN/1.73M2 — SIGNIFICANT CHANGE UP
ESTIMATED AVERAGE GLUCOSE: 143 MG/DL — HIGH (ref 68–114)
GLUCOSE SERPL-MCNC: 165 MG/DL — HIGH (ref 70–99)
HCT VFR BLD CALC: 44.2 % — SIGNIFICANT CHANGE UP (ref 39–50)
HGB BLD-MCNC: 13.9 G/DL — SIGNIFICANT CHANGE UP (ref 13–17)
MCHC RBC-ENTMCNC: 28.9 PG — SIGNIFICANT CHANGE UP (ref 27–34)
MCHC RBC-ENTMCNC: 31.4 GM/DL — LOW (ref 32–36)
MCV RBC AUTO: 91.9 FL — SIGNIFICANT CHANGE UP (ref 80–100)
NRBC # BLD: 0 /100 WBCS — SIGNIFICANT CHANGE UP (ref 0–0)
PLATELET # BLD AUTO: 186 K/UL — SIGNIFICANT CHANGE UP (ref 150–400)
POTASSIUM SERPL-MCNC: 4.2 MMOL/L — SIGNIFICANT CHANGE UP (ref 3.5–5.3)
POTASSIUM SERPL-SCNC: 4.2 MMOL/L — SIGNIFICANT CHANGE UP (ref 3.5–5.3)
RBC # BLD: 4.81 M/UL — SIGNIFICANT CHANGE UP (ref 4.2–5.8)
RBC # FLD: 17.9 % — HIGH (ref 10.3–14.5)
SODIUM SERPL-SCNC: 139 MMOL/L — SIGNIFICANT CHANGE UP (ref 135–145)
WBC # BLD: 9.17 K/UL — SIGNIFICANT CHANGE UP (ref 3.8–10.5)
WBC # FLD AUTO: 9.17 K/UL — SIGNIFICANT CHANGE UP (ref 3.8–10.5)

## 2024-03-05 RX ORDER — ACETAMINOPHEN 500 MG
2 TABLET ORAL
Refills: 0 | DISCHARGE

## 2024-03-05 NOTE — H&P PST ADULT - HISTORY OF PRESENT ILLNESS
Mr. Bocanegra is a 63 year old man with PMH HTN, AMBREEN (denies), T2DM and bladder cancer who was admitted 10/28/2023 for gross hematuria treated with fisher and CBI which he failed to wean from went to OR where a large clot was noted as well as the bladder tumor, had resection, benign.  He has a ureteral stone with internal stent in place now scheduled for cystoscopy, left ureteroscopy lithotripsy of stone, left ureteral stent placement and TURB.  Of note, he was diagnosed with T2DM during his original surgery, repeat A1c in pending.  Patient also has newly diagnosed HTN, does not appear to be controlled, he has medical eval for 3/18/2024, instructed today to call PCP today re: elevated BP at PST after taking morning BP medication at home.                                                                                                                     Mr. Bocanegra is a 63 year old man with PMH HTN, AMBREEN (denies), T2DM, every day smoker and bladder cancer who was admitted 10/28/2023 for gross hematuria treated with fisher and CBI which he failed to wean from went to OR where a large clot was noted as well as the bladder tumor, had resection, benign.  He has a ureteral stone with internal stent in place now scheduled for cystoscopy, left ureteroscopy lithotripsy of stone, left ureteral stent placement and TURB.  Of note, he was diagnosed with T2DM during his original surgery, repeat A1c in pending.  Patient also has newly diagnosed HTN, does not appear to be controlled, he has medical eval for 3/18/2024, instructed today to call PCP today re: elevated BP at PST after taking morning BP medication at home.

## 2024-03-05 NOTE — H&P PST ADULT - PROBLEM SELECTOR PLAN 2
Elevated BP in PST, patient, instructed to call PCP today  Instructed to take BP medications as prescribed

## 2024-03-05 NOTE — H&P PST ADULT - ASSESSMENT
Dental:  denies dentures, denies loose teeth, has missing teeth    DASI: Gardening, housework, ADLs, can walk 1 mile easily, has 2 flights of stairs in house up and down frequently     Mallampati: 3

## 2024-03-05 NOTE — H&P PST ADULT - NSICDXPASTMEDICALHX_GEN_ALL_CORE_FT
PAST MEDICAL HISTORY:  Bladder tumor     DM2 (diabetes mellitus, type 2)     History of umbilical hernia     HTN (hypertension)     Obese      PAST MEDICAL HISTORY:  Bladder tumor     Current every day smoker     DM2 (diabetes mellitus, type 2)     History of umbilical hernia     HTN (hypertension)     Obese

## 2024-03-05 NOTE — H&P PST ADULT - NSANTHOSAYNRD_GEN_A_CORE
states he has never been tested/No. AMBREEN screening performed.  STOP BANG Legend: 0-2 = LOW Risk; 3-4 = INTERMEDIATE Risk; 5-8 = HIGH Risk

## 2024-03-05 NOTE — H&P PST ADULT - PROBLEM SELECTOR PLAN 1
Scheduled for cystoscopy, left ureteroscopy lithotripsy of stone, left ureteral stent placement and TURB on 3/26/2024  Preop instructions reviewed, written instructions sent home with patient  Labs pending-cbc, bmp, a1c, urine cx  Medical eval 3/18/2024

## 2024-03-06 LAB
CULTURE RESULTS: SIGNIFICANT CHANGE UP
SPECIMEN SOURCE: SIGNIFICANT CHANGE UP

## 2024-03-18 PROBLEM — F17.200 NICOTINE DEPENDENCE, UNSPECIFIED, UNCOMPLICATED: Chronic | Status: ACTIVE | Noted: 2024-03-05

## 2024-03-18 NOTE — CONSULT NOTE ADULT - REASON FOR ADMISSION
medical clearance for cystoscopy, left ureteroscopy lithotripsy of stone, left ureteral stent placement and TURB scheduled for 3/26

## 2024-03-18 NOTE — CONSULT NOTE ADULT - SUBJECTIVE AND OBJECTIVE BOX
63 year old man with PMH HTN, AMBREEN (denies), T2DM, every day smoker and bladder cancer who was admitted 10/28/2023 for gross hematuria treated with fisher and CBI which he failed to wean from went to OR where a large clot was noted as well as the bladder tumor, had resection, pathology : benign as per .    He has a ureteral stone with internal stent in place now scheduled for cystoscopy, left ureteroscopy lithotripsy of stone, left ureteral stent placement and TURB.    Of note, he was diagnosed with T2DM during his 12/23 admision. has been on Janumet since [place] and sees Archie Maciel  repeat HA1c on 3/5 is 6.6%.    He has been compliant with all his medications  He is a chronic cigarette smoker and has anxiety  He has AMBREEN but hasnt had a sleep study and not treated.   He has white coat HTN and BP at home and in my office has been stable , today in the office its 130/80, the numbers are about the same at home  He is instructed to bring his machine and his home BP readings when he sees doctors  Htn was an additional new  diagnosis on his 12/2023 admission, as well as AMBREEN. He needed hospitalization post IR procedure on 1/31/24  2/2 acute Hypercapnic hypoxic resp failure in recovery post IR procedure. Did well with BIPAP overnight.     ROS: no CP, no palps, no N/V, no hematuria, no abd pain, gets nervous when seeing doctors    Allergies: No Known Allergies:       PAST MEDICAL HISTORY:  Bladder tumor   Current every day smoker   DM2 (diabetes mellitus, type 2) , stable on Janumet  History of umbilical hernia   HTN (hypertension)   Morbidly Obese  AMBREEN, untreated    PAST SURGICAL HISTORY:  H/O cystoscopy   H/O transurethral resection of bladder tumor (TURBT).       Anesthesia History:  Previous Reaction to Anesthesia	none  Family History of Anesthesia Reaction/Malignant Hyperthermia	No  Latex Allergy	No    Social History: single , lives with his brother, who is ill    Substance Use	caffeine  no recreational drug use  Caffeine Type	coffee   Caffeine Amount/Frequency	1-2 cups/cans per day  Caffeine Withdrawal Pattern	na     Alcohol Use History: Hasn't had etoh in over a year, prior to that 1-2 beer    Tobacco Usage: Current every day smoker  Tobacco Type: cigarettes  Average Number of Packs per Day: 0.5  Number of yrs: 45   Pack yrs: 22        Home Medications:   budesonide-formoterol 160 mcg-4.5 mcg/inh inhalation aerosol: Last Dose Taken:  , 1 puff(s) inhaled 2 times a day as directed  Janumet 50 mg-1000 mg oral tablet: Last Dose Taken:  , 1 tab(s) orally 2 times a day  folic acid 1 mg oral tablet: Last Dose Taken:  , 1 tab(s) orally once a day  losartan 25 mg oral tablet: Last Dose Taken:  , 1 tab(s) orally once a day  cyanocobalamin 1000 mcg oral tablet: Last Dose Taken:  , 1 tab(s) orally once a day    Review of Systems:   · General	negative  · Skin/Breast	negative  · Ophthalmologic	negative  · ENMT	negative  · Negative Respiratory and Thorax Symptoms	no wheezing; no dyspnea; no cough  · Cardiovascular	negative  · Gastrointestinal	negative  · General Genitourinary Symptoms	hematuria; flank pain L; dysuria  · Musculoskeletal Symptoms	arthritis; left knee and ankle  · Neurological	negative  · Negative Psychiatric Symptoms	no suicidal ideation; no depression; no anxiety  · Hematology/Lymphatics	negative  · Endocrine Comments	recently dx with diabetes does FS in am, doesn't recall reading from this morning  · Negative Allergy Types	no outdoor environmental allergies; no indoor environmental allergies; no reactions to medicines; no reactions to food; no reactions to animals     Functional Status:  · Functional Status	4-10 METS    Sleep Apnea Screening:  Confirmed Obstructive Sleep Apnea (AMBREEN):YES. AMBREEN screening performed: NO, however advised several times   STOP BANG Legend: 0-2 = LOW Risk; 3-4 = INTERMEDIATE Risk; 5-8 = HIGH Risk  states he has never been tested  S - Do you SNORE loudly (louder than talking or loud enough to be heard through closed doors): YES  T - Are you TIRED, fatigued, or sleepy during the daytime: No  O - Has anyone OBSERVED you stop breathing during your sleep: YES  P - Do you have or are you being treated for high blood PRESSURE: Yes  B - BMI greater than 35 kG/m2: Yes  A - AGE over 50 years old: Yes  N - NECK circumference: > 17 inches  G - GENDER male: Yes  STOP BANG Score: 5       Height/Weight/BSA/BMI:  · Height (FEET)	5 Feet  · Height (INCHES)	7.5 Inch(s)  · Height (CENTIMETERS)	171.45 Centimeter(s)  · 	 used  · Dosing Weight (KILOGRAMS)	104.3 kg  · Dosing Weight  (POUNDS)	229.9 lb  · BSA (m2)	2.16 Meter Squared  · BMI (kG/m2)	35.5      · Temp (F)	98.4 Degrees F  · Temp (C)	36.9 Degrees C  · Heart Rate	67 /min  · Respiration Rate (breaths/min)	16 /min  · BP Systolic	130 mm Hg  · BP Diastolic	80 mm Hg  · Blood Pressure - Method	electronic    · SpO2 (%)	96 %  · O2 Delivery/Oxygen Delivery Method	room air    Pain Management:    Pain Assessment:  No Pain Score: 0 /10 Site: na Pain Goal: 0 /10.    Physical Exam:    Physical Exam:  · Constitutional	well-groomed; obese  · Eyes	PERRL; EOMI; conjunctiva clear  · ENMT	no gross abnormalities, poor dentition  · Respiratory	clear to auscultation bilaterally; no wheezes; no rhonchi; breath sounds equal; good air movement  · Cardiovascular	regular rate and rhythm; S1 S2 present; no murmur; pedal edema; mild, non-pitting  · Gastrointestinal: soft; nontender; normal active bowel sounds  · Gastrointestinal Comments	umbilical hernia  · Genitourinary Female	not applicable  · Neurological	sensation intact; responds to pain; responds to verbal commands  · Skin	warm and dry; color normal; no rashes  · Lymphatics Comments	no cervical lymphadenopathy  · Musculoskeletal	normal gait; ROM intact; strength 5/5 bilateral upper extremities; strength 5/5 bilateral lower extremities  · Psychiatric	normal affect; alert and oriented x3; anxious    Diagnostic Studies:    Cardiac Tests:  · Last Echocardiogram	2023  · Last Stress Test	2024       Devices:  Implants/Medical Devices	urinary stent  Dental:  denies dentures, denies loose teeth, has missing teeth    DASI: Gardening, housework, ADLs, can walk 1 mile easily, has 2 flights of stairs in house up and down frequently

## 2024-03-18 NOTE — CONSULT NOTE ADULT - ASSESSMENT
63 year old man with PMH HTN, AMBREEN (denies), T2DM, every day smoker and bladder cancer who was admitted 10/28/2023 for gross hematuria treated with fisher and CBI which he failed to wean from went to OR where a large clot was noted as well as the bladder tumor, had resection, pathology : benign as per .    He has a ureteral stone with internal stent in place now scheduled for cystoscopy, left ureteroscopy lithotripsy of stone, left ureteral stent placement and TURB.    Of note, he was diagnosed with T2DM during his 12/23 admision. has been on Janumet since [place] and sees Archie Maciel  repeat HA1c on 3/5 is 6.6%.    He has been compliant with all his medications  He is a chronic cigarette smoker and has anxiety  He has AMBREEN but hasnt had a sleep study and not treated.   He has white coat HTN and BP at home and in my office has been stable , today in the office its 130/80, the numbers are about the same at home  He is instructed to bring his machine and his home BP readings when he sees doctors  Htn was an additional new  diagnosis on his 12/2023 admission, as well as AMBREEN. He needed hospitalization post IR procedure on 1/31/24  2/2 acute Hypercapnic hypoxic resp failure in recovery post IR procedure. Did well with BIPAP overnight.   Ptn is scheduled for cystoscopy, left ureteroscopy lithotripsy of stone, left ureteral stent placement and TURB on 3/26/2024  ptn is medically cleared for the procedure with an intermediate risk.  He is at risk to develop recurrence of acute hypercapnic hypoxic respiratory failure in recovery  His BP is controlled  His DM is controlled. he is instructed to Sk[place] Janumet the night before    I am available for any questions you might have  [phone]

## 2024-03-25 ENCOUNTER — TRANSCRIPTION ENCOUNTER (OUTPATIENT)
Age: 64
End: 2024-03-25

## 2024-03-26 ENCOUNTER — TRANSCRIPTION ENCOUNTER (OUTPATIENT)
Age: 64
End: 2024-03-26

## 2024-03-26 ENCOUNTER — OUTPATIENT (OUTPATIENT)
Dept: INPATIENT UNIT | Facility: HOSPITAL | Age: 64
LOS: 1 days | End: 2024-03-26
Payer: COMMERCIAL

## 2024-03-26 ENCOUNTER — RESULT REVIEW (OUTPATIENT)
Age: 64
End: 2024-03-26

## 2024-03-26 ENCOUNTER — APPOINTMENT (OUTPATIENT)
Dept: UROLOGY | Facility: HOSPITAL | Age: 64
End: 2024-03-26

## 2024-03-26 VITALS
TEMPERATURE: 99 F | HEIGHT: 67.5 IN | HEART RATE: 72 BPM | DIASTOLIC BLOOD PRESSURE: 90 MMHG | OXYGEN SATURATION: 96 % | WEIGHT: 229.94 LBS | RESPIRATION RATE: 16 BRPM | SYSTOLIC BLOOD PRESSURE: 149 MMHG

## 2024-03-26 VITALS
RESPIRATION RATE: 16 BRPM | OXYGEN SATURATION: 95 % | SYSTOLIC BLOOD PRESSURE: 140 MMHG | DIASTOLIC BLOOD PRESSURE: 88 MMHG | TEMPERATURE: 99 F | HEART RATE: 61 BPM

## 2024-03-26 DIAGNOSIS — N13.30 UNSPECIFIED HYDRONEPHROSIS: ICD-10-CM

## 2024-03-26 DIAGNOSIS — Z98.890 OTHER SPECIFIED POSTPROCEDURAL STATES: Chronic | ICD-10-CM

## 2024-03-26 DIAGNOSIS — N20.1 CALCULUS OF URETER: ICD-10-CM

## 2024-03-26 LAB
GLUCOSE BLDC GLUCOMTR-MCNC: 154 MG/DL — HIGH (ref 70–99)
GLUCOSE BLDC GLUCOMTR-MCNC: 164 MG/DL — HIGH (ref 70–99)

## 2024-03-26 PROCEDURE — C1758: CPT

## 2024-03-26 PROCEDURE — 83036 HEMOGLOBIN GLYCOSYLATED A1C: CPT

## 2024-03-26 PROCEDURE — 87086 URINE CULTURE/COLONY COUNT: CPT

## 2024-03-26 PROCEDURE — 52204 CYSTOSCOPY W/BIOPSY(S): CPT | Mod: XS

## 2024-03-26 PROCEDURE — 52352 CYSTOURETERO W/STONE REMOVE: CPT | Mod: LT

## 2024-03-26 PROCEDURE — C1889: CPT

## 2024-03-26 PROCEDURE — 52204 CYSTOSCOPY W/BIOPSY(S): CPT | Mod: 59

## 2024-03-26 PROCEDURE — 80048 BASIC METABOLIC PNL TOTAL CA: CPT

## 2024-03-26 PROCEDURE — 88300 SURGICAL PATH GROSS: CPT

## 2024-03-26 PROCEDURE — 88300 SURGICAL PATH GROSS: CPT | Mod: 26,59

## 2024-03-26 PROCEDURE — 74420 UROGRAPHY RTRGR +-KUB: CPT | Mod: 26

## 2024-03-26 PROCEDURE — 76000 FLUOROSCOPY <1 HR PHYS/QHP: CPT

## 2024-03-26 PROCEDURE — 85027 COMPLETE CBC AUTOMATED: CPT

## 2024-03-26 PROCEDURE — 88305 TISSUE EXAM BY PATHOLOGIST: CPT

## 2024-03-26 PROCEDURE — C1769: CPT

## 2024-03-26 PROCEDURE — 88305 TISSUE EXAM BY PATHOLOGIST: CPT | Mod: 26

## 2024-03-26 PROCEDURE — 52332 CYSTOSCOPY AND TREATMENT: CPT | Mod: LT

## 2024-03-26 PROCEDURE — 82365 CALCULUS SPECTROSCOPY: CPT

## 2024-03-26 PROCEDURE — C2617: CPT

## 2024-03-26 PROCEDURE — G0463: CPT

## 2024-03-26 PROCEDURE — 82962 GLUCOSE BLOOD TEST: CPT

## 2024-03-26 DEVICE — STENT URET INLAY OPTIMA 6FRX24CM: Type: IMPLANTABLE DEVICE | Site: LEFT | Status: FUNCTIONAL

## 2024-03-26 DEVICE — URETERAL CATH OPEN END 5FR 70CM: Type: IMPLANTABLE DEVICE | Site: LEFT | Status: FUNCTIONAL

## 2024-03-26 DEVICE — GUIDEWIRE SENSOR DUAL-FLEX NITINOL STRAIGHT .035" X 150CM: Type: IMPLANTABLE DEVICE | Site: LEFT | Status: FUNCTIONAL

## 2024-03-26 DEVICE — STONE BASKET ZEROTIP NITINOL 4-WIRE 1.9FR 120CM X 12MM: Type: IMPLANTABLE DEVICE | Site: LEFT | Status: FUNCTIONAL

## 2024-03-26 RX ORDER — LIDOCAINE HCL 20 MG/ML
0.2 VIAL (ML) INJECTION ONCE
Refills: 0 | Status: DISCONTINUED | OUTPATIENT
Start: 2024-03-26 | End: 2024-03-26

## 2024-03-26 RX ORDER — FENTANYL CITRATE 50 UG/ML
25 INJECTION INTRAVENOUS
Refills: 0 | Status: DISCONTINUED | OUTPATIENT
Start: 2024-03-26 | End: 2024-03-26

## 2024-03-26 RX ORDER — CEFAZOLIN SODIUM 1 G
2000 VIAL (EA) INJECTION ONCE
Refills: 0 | Status: COMPLETED | OUTPATIENT
Start: 2024-03-26 | End: 2024-03-26

## 2024-03-26 RX ORDER — SODIUM CHLORIDE 9 MG/ML
1000 INJECTION, SOLUTION INTRAVENOUS
Refills: 0 | Status: DISCONTINUED | OUTPATIENT
Start: 2024-03-26 | End: 2024-03-26

## 2024-03-26 RX ORDER — TAMSULOSIN HYDROCHLORIDE 0.4 MG/1
1 CAPSULE ORAL
Qty: 6 | Refills: 0
Start: 2024-03-26 | End: 2024-03-28

## 2024-03-26 RX ORDER — PREGABALIN 225 MG/1
1 CAPSULE ORAL
Refills: 0 | DISCHARGE

## 2024-03-26 RX ORDER — SODIUM CHLORIDE 9 MG/ML
3 INJECTION INTRAMUSCULAR; INTRAVENOUS; SUBCUTANEOUS EVERY 8 HOURS
Refills: 0 | Status: DISCONTINUED | OUTPATIENT
Start: 2024-03-26 | End: 2024-03-26

## 2024-03-26 NOTE — ASU PATIENT PROFILE, ADULT - FALL HARM RISK - RISK INTERVENTIONS

## 2024-03-26 NOTE — ASU PATIENT PROFILE, ADULT - INTERNATIONAL TRAVEL
"[facility] Note    CC: Duodenal mass    HPI 66 y.o. female with duodenal mass, recently diagnosed, visualized on EGD with push enteroscopy, associated with metastatic disease of the liver and with past history of remote colon cancer.  Case discussed at length with patient and family today and with Dr. Romero by phone yesterday and she is willing to be evaluated for possible enteral stenting given the concern for impending obstruction.    Past Medical History:   Diagnosis Date    Cancer     colon    Encounter for blood transfusion     Hypertension          Review of Systems  General ROS: negative for - chills, fever or weight loss  Cardiovascular ROS: no chest pain or dyspnea on exertion  Gastrointestinal ROS: + abdominal pain    Physical Examination  /60 (BP Location: Left arm, Patient Position: Sitting)   Pulse 96   Temp 97.3 °F (36.3 °C) (Temporal)   Resp 18   Ht 5' 2" (1.575 m) Comment: Office 3/16/18  Wt 104.8 kg (231 lb 0.7 oz)   SpO2 98%   Breastfeeding? No   BMI 42.26 kg/m²   General appearance: alert, cooperative, no distress  HENT: Normocephalic, atraumatic, neck symmetrical, no nasal discharge, sclera anicteric   Lungs: clear to auscultation bilaterally, symmetric chest wall expansion bilaterally  Heart: regular rate and rhythm without rub; no displacement of the PMI   Abdomen: obese, NT  Extremities: extremities symmetric; no clubbing, cyanosis, or edema  Neurologic: Alert and oriented X 3, no sensory or motor neurologic deficits      Labs:  Lab Results   Component Value Date    WBC 11.20 01/04/2019    HGB 8.5 (L) 01/04/2019    HCT 27.2 (L) 01/04/2019    MCV 79 (L) 01/04/2019     (H) 01/04/2019             Assessment:   1.  History of colon cancer  2.  Duodenal mass  3.  Liver metastasis  4.  Obesity    Plan:  1.  To OR today for port placement per surgery  2.  Referral to [facility] at [facility] for consideration of enteral stenting.  Will send communication  3.  Follow up " biopsies  4.  GI to sign off for now.  Call for questions.    Adis Valverde MD  [facility]  [address], [address]  [place] [address]  Office: [phone]  Fax: [phone]     No

## 2024-03-26 NOTE — ASU DISCHARGE PLAN (ADULT/PEDIATRIC) - NS MD DC FALL RISK RISK
For information on Fall & Injury Prevention, visit: [email or web address] OR  [email or web address] OR  https://www.cdc.gov/steadi/patient.html

## 2024-03-26 NOTE — BRIEF OPERATIVE NOTE - NSICDXBRIEFPROCEDURE_GEN_ALL_CORE_FT
PROCEDURES:  Cystoscopy with replacement of left ureteral stent 26-Mar-2024 12:03:42  Jaden Menezes

## 2024-03-26 NOTE — ASU DISCHARGE PLAN (ADULT/PEDIATRIC) - CARE PROVIDER_API CALL
Clarice Rahman  Urology  [address] [place] [id number]  Phone: [phone]  Fax: [phone]  Scheduled Appointment: 04/01/2024

## 2024-03-26 NOTE — ASU DISCHARGE PLAN (ADULT/PEDIATRIC) - ASU DC SPECIAL INSTRUCTIONSFT
STENT: You may have an internal stent (a hollow tube that runs from the kidney to your bladder) after your procedure, which helps urine drain from the kidney to your bladder. Some patients experience urinary frequency, burning, or even back pain (especially with urination). These sensations will gradually get better. Increasing your fluid intake can also improve these symptoms. While the stent is in place, your urine may continue to be bloody. This stent is temporary and must be removed by your urologist as an outpatient with in 3 months unless otherwise specified. If your stent is on a string, it is secured to your leg or genitalia with an adhesive bandage. Do not pull on the string, do not remove the bandage, do not insert anything intravaginally/intraurethrally, and do not engage in sexual intercourse until after the stent is removed at your post-operative appointment.  GENERAL: It is common to have blood in your urine after your procedure. It may be pink or even red; inform your doctor if you have a significant amount of clot in the urine or if you are unable to void at all. The urine may clear and then become bloody again especially as you are more physically active.  BATHING: You may shower or bathe.  DIET: You may resume your regular diet and regular medication regimen.  PAIN: You may take Tylenol (acetaminophen) 650-975mg and/or Motrin (ibuprofen) 400-600mg, both available over the counter, for pain every 6 hours as needed. Do not exceed 4000mg of Tylenol (acetaminophen) daily. You may alternate these medications such that you take one or the other every 3 hours for around the clock pain coverage. If you have a stent, the following medications may have been sent to your pharmacy for stent related discomfort: Flomax (tamsulosin) 0.4mg at bedtime until stent removed, Ditropan (oxybutynin) 5mg every 8 hours as needed for bladder spasms, and Pyridium (phenazopyridine) 100mg every 8 hours as needed for kidney/bladder discomfort for max 3 days (Pyridium will make your urine orange).  ANTIBIOTICS: You may be given a prescription for an antibiotic, please take this medication as instructed and be sure to complete the entire course.  STOOL SOFTENERS: Do not allow yourself to become constipated as straining may cause bleeding. Take stool softeners or a laxative (ex. Miralax, Colace, Senokot, ExLax, etc), available over the counter, if needed.  ACTIVITY: No heavy lifting or strenuous exercise until you are evaluated at your post-operative appointment. Otherwise, you may return to your usual level of physical activity.  ANTICOAGULATION: If you are taking any blood thinning medications, please discuss with your urologist prior to restarting these medications unless otherwise specified.  FOLLOW-UP: If you did not already schedule your post-operative appointment, please call your urologist to schedule a follow-up appointment.  CALL YOUR UROLOGIST IF: You have any bleeding that does not stop, inability to void >8 hours, fever over 100.4 F, chills, persistent nausea/vomiting, changes in your incision concerning for infection, or if your pain is not controlled on your discharge pain medications. STENT: You may have an internal stent (a hollow tube that runs from the kidney to your bladder) after your procedure, which helps urine drain from the kidney to your bladder. Some patients experience urinary frequency, burning, or even back pain (especially with urination). These sensations will gradually get better. Increasing your fluid intake can also improve these symptoms. While the stent is in place, your urine may continue to be bloody. This stent is temporary and must be removed by your urologist as an outpatient with in 3 months unless otherwise specified.   GENERAL: It is common to have blood in your urine after your procedure. It may be pink or even red; inform your doctor if you have a significant amount of clot in the urine or if you are unable to void at all. The urine may clear and then become bloody again especially as you are more physically active.  BATHING: You may shower or bathe.  DIET: You may resume your regular diet and regular medication regimen.  PAIN: You may take Tylenol (acetaminophen) 650-975mg and/or Motrin (ibuprofen) 400-600mg, both available over the counter, for pain every 6 hours as needed. Do not exceed 4000mg of Tylenol (acetaminophen) daily. You may alternate these medications such that you take one or the other every 3 hours for around the clock pain coverage. If you have a stent, the following medications may have been sent to your pharmacy for stent related discomfort: Flomax (tamsulosin) 0.4mg at bedtime until stent   STOOL SOFTENERS: Do not allow yourself to become constipated as straining may cause bleeding. Take stool softeners or a laxative (ex. Miralax, Colace, Senokot, ExLax, etc), available over the counter, if needed.  ACTIVITY: No heavy lifting or strenuous exercise until you are evaluated at your post-operative appointment. Otherwise, you may return to your usual level of physical activity.  ANTICOAGULATION: If you are taking any blood thinning medications, please discuss with your urologist prior to restarting these medications unless otherwise specified.  FOLLOW-UP: If you did not already schedule your post-operative appointment, please call your urologist to schedule a follow-up appointment.  CALL YOUR UROLOGIST IF: You have any bleeding that does not stop, inability to void >8 hours, fever over 100.4 F, chills, persistent nausea/vomiting, changes in your incision concerning for infection, or if your pain is not controlled on your discharge pain medications.

## 2024-03-26 NOTE — PRE-ANESTHESIA EVALUATION ADULT - NSATTENDATTESTRD_GEN_ALL_CORE
The patient has been re-examined and I agree with the above assessment or I updated with my findings.
04-Apr-2023 20:50

## 2024-03-30 LAB
CELL MATERIAL STONE EST-MCNT: SIGNIFICANT CHANGE UP
LABORATORY COMMENT REPORT: SIGNIFICANT CHANGE UP
NIDUS STONE QN: SIGNIFICANT CHANGE UP

## 2024-04-01 ENCOUNTER — OUTPATIENT (OUTPATIENT)
Dept: OUTPATIENT SERVICES | Facility: HOSPITAL | Age: 64
LOS: 1 days | End: 2024-04-01
Payer: COMMERCIAL

## 2024-04-01 ENCOUNTER — APPOINTMENT (OUTPATIENT)
Dept: UROLOGY | Facility: CLINIC | Age: 64
End: 2024-04-01
Payer: COMMERCIAL

## 2024-04-01 DIAGNOSIS — N13.30 UNSPECIFIED HYDRONEPHROSIS: ICD-10-CM

## 2024-04-01 DIAGNOSIS — Z98.890 OTHER SPECIFIED POSTPROCEDURAL STATES: Chronic | ICD-10-CM

## 2024-04-01 DIAGNOSIS — N20.1 CALCULUS OF URETER: ICD-10-CM

## 2024-04-01 DIAGNOSIS — R35.0 FREQUENCY OF MICTURITION: ICD-10-CM

## 2024-04-01 DIAGNOSIS — C67.0 MALIGNANT NEOPLASM OF TRIGONE OF BLADDER: ICD-10-CM

## 2024-04-01 PROCEDURE — 52310 CYSTOSCOPY AND TREATMENT: CPT

## 2024-04-02 DIAGNOSIS — N13.30 UNSPECIFIED HYDRONEPHROSIS: ICD-10-CM

## 2024-04-02 DIAGNOSIS — C67.0 MALIGNANT NEOPLASM OF TRIGONE OF BLADDER: ICD-10-CM

## 2024-04-02 DIAGNOSIS — N20.1 CALCULUS OF URETER: ICD-10-CM

## 2024-04-02 LAB — SURGICAL PATHOLOGY STUDY: SIGNIFICANT CHANGE UP

## 2024-04-10 ENCOUNTER — NON-APPOINTMENT (OUTPATIENT)
Age: 64
End: 2024-04-10

## 2024-04-24 ENCOUNTER — OUTPATIENT (OUTPATIENT)
Dept: OUTPATIENT SERVICES | Facility: HOSPITAL | Age: 64
LOS: 1 days | End: 2024-04-24
Payer: COMMERCIAL

## 2024-04-24 ENCOUNTER — APPOINTMENT (OUTPATIENT)
Dept: ULTRASOUND IMAGING | Facility: CLINIC | Age: 64
End: 2024-04-24
Payer: COMMERCIAL

## 2024-04-24 DIAGNOSIS — Z98.890 OTHER SPECIFIED POSTPROCEDURAL STATES: Chronic | ICD-10-CM

## 2024-04-24 DIAGNOSIS — N13.30 UNSPECIFIED HYDRONEPHROSIS: ICD-10-CM

## 2024-04-24 DIAGNOSIS — C67.0 MALIGNANT NEOPLASM OF TRIGONE OF BLADDER: ICD-10-CM

## 2024-04-24 PROCEDURE — 76770 US EXAM ABDO BACK WALL COMP: CPT

## 2024-04-24 PROCEDURE — 76770 US EXAM ABDO BACK WALL COMP: CPT | Mod: 26

## 2024-07-10 ENCOUNTER — OUTPATIENT (OUTPATIENT)
Dept: OUTPATIENT SERVICES | Facility: HOSPITAL | Age: 64
LOS: 1 days | End: 2024-07-10
Payer: COMMERCIAL

## 2024-07-10 ENCOUNTER — APPOINTMENT (OUTPATIENT)
Dept: UROLOGY | Facility: CLINIC | Age: 64
End: 2024-07-10
Payer: COMMERCIAL

## 2024-07-10 VITALS — HEART RATE: 69 BPM | SYSTOLIC BLOOD PRESSURE: 162 MMHG | DIASTOLIC BLOOD PRESSURE: 98 MMHG

## 2024-07-10 DIAGNOSIS — C67.0 MALIGNANT NEOPLASM OF TRIGONE OF BLADDER: ICD-10-CM

## 2024-07-10 DIAGNOSIS — Z98.890 OTHER SPECIFIED POSTPROCEDURAL STATES: Chronic | ICD-10-CM

## 2024-07-10 PROCEDURE — 52000 CYSTOURETHROSCOPY: CPT

## 2024-07-11 DIAGNOSIS — R35.0 FREQUENCY OF MICTURITION: ICD-10-CM

## 2024-07-12 LAB — URINE CYTOLOGY: NORMAL

## 2024-07-23 DIAGNOSIS — C67.0 MALIGNANT NEOPLASM OF TRIGONE OF BLADDER: ICD-10-CM

## 2024-09-30 ENCOUNTER — APPOINTMENT (OUTPATIENT)
Dept: ORTHOPEDIC SURGERY | Facility: CLINIC | Age: 64
End: 2024-09-30
Payer: COMMERCIAL

## 2024-09-30 VITALS — BODY MASS INDEX: 32.58 KG/M2 | HEIGHT: 69 IN | WEIGHT: 220 LBS

## 2024-09-30 PROCEDURE — 73564 X-RAY EXAM KNEE 4 OR MORE: CPT | Mod: LT

## 2024-09-30 PROCEDURE — 99203 OFFICE O/P NEW LOW 30 MIN: CPT

## 2024-09-30 RX ORDER — DICLOFENAC SODIUM 75 MG/1
75 TABLET, DELAYED RELEASE ORAL TWICE DAILY
Qty: 60 | Refills: 0 | Status: ACTIVE | COMMUNITY
Start: 2024-09-30 | End: 1900-01-01

## 2024-10-01 ENCOUNTER — APPOINTMENT (OUTPATIENT)
Dept: ORTHOPEDIC SURGERY | Facility: CLINIC | Age: 64
End: 2024-10-01
Payer: COMMERCIAL

## 2024-10-01 VITALS — WEIGHT: 220 LBS | HEIGHT: 69 IN | BODY MASS INDEX: 32.58 KG/M2

## 2024-10-01 DIAGNOSIS — M17.12 UNILATERAL PRIMARY OSTEOARTHRITIS, LEFT KNEE: ICD-10-CM

## 2024-10-01 PROCEDURE — 20610 DRAIN/INJ JOINT/BURSA W/O US: CPT | Mod: LT

## 2024-10-01 PROCEDURE — 99214 OFFICE O/P EST MOD 30 MIN: CPT | Mod: 25

## 2024-10-01 NOTE — ADDENDUM
[FreeTextEntry1] : This note was written by Esmer Villatoro on 09/30/2024 acting solely as a scribe for Dr. Dustin Cantu.   All medical record entries made by the Scribe were at my, Dr. Dustin Cantu, direction and personally dictated by me on 09/30/2024. I have personally reviewed the chart and agree that the record accurately reflects my personal performance of the history, physical exam, assessment and plan.

## 2024-10-01 NOTE — PHYSICAL EXAM
[de-identified] : left knee exam  Skin: Clean, dry, intact Inspection: Varus deformity, no masses, + swelling, + moderate effusion Pulses: 2+ DP/PT pulses ROM: 10-80 degrees of flexion. No pain with deep knee flexion/extension. Tenderness: No MJLT. No LJLT. No pain over the patella facets. No pain to the quadriceps tendon. No pain to the patella tendon. No posterior knee tenderness. Stability: Stable to varus, valgus. Negative lachman testing. Negative anterior drawer, negative posterior drawer. Strength: 5/5 Q/H/TA/GS/EHL, without atrophy Neuro: In tact to light touch throughout, DTR's normal Additional tests: Negative McMurrays test, Negative patellar grind test.  [de-identified] : The following radiographs were ordered and read by me during this patients visit. I reviewed each radiograph in detail with the patient and discussed the findings as highlighted below.   4 views of the left knee were obtained, 09/30/2024, that show no acute fracture or dislocation. There is severe medial, severe lateral and severe patellofemoral degenerative changes seen. There is no significant malalignment. No significant other obvious osseous abnormality, otherwise unremarkable.

## 2024-10-01 NOTE — HISTORY OF PRESENT ILLNESS
[de-identified] : This is a 64-year-old gentleman experiencing left knee pain, which is severe in intensity. The pain substantially limits activities of daily living. Walking tolerance is reduced.  He has known left knee osteoarthritis.  He has been taking diclofenac.  The patient denies any radiation of the pain to the feet and it is not associated with numbness, tingling, or weakness.

## 2024-10-01 NOTE — DISCUSSION/SUMMARY
[de-identified] :  This patient has left knee osteoarthritis.  The patient is not an appropriate candidate for surgical intervention at this time. An extensive discussion was conducted on the natural history of the disease and the variety of surgical and non-surgical options available to the patient including, but not limited to non-steroidal anti-inflammatory medications, steroid injections, physical therapy, maintenance of ideal body weight, and reduction of activity.  I recommended and prescribed a course of physical therapy.  He has a prescription for diclofenac at home and will take this medication.  He does not need a new prescription today.  The patient is also encouraged to trial a neoprene sleeve knee brace which can be purchased OTC.  The patient is also encouraged to consider use of a cane.  Today we performed a left knee intra-articular cortisone injection. The patient will schedule an appointment as needed.  Informed consent for the left knee injection was obtained. All risks, benefits and alternatives were discussed. These included but were not limited to bleeding, infection, and allergic reaction.  All questions were answered. A time out was performed. The left knee was prepped and draped in sterile fashion. Using sterile technique, the left knee was injected with 80mg of Kenalog, 4cc of 1% lidocaine, 4cc of 0.25% marcaine using a 21-gauge needle. A sterile dressing was applied. Post injection instructions were reviewed. The patient tolerated the procedure well.

## 2024-10-01 NOTE — HISTORY OF PRESENT ILLNESS
[de-identified] : This is a 64-year-old gentleman experiencing left knee pain, which is severe in intensity. The pain substantially limits activities of daily living. Walking tolerance is reduced.  He has known left knee osteoarthritis.  He has been taking diclofenac.  The patient denies any radiation of the pain to the feet and it is not associated with numbness, tingling, or weakness.

## 2024-10-01 NOTE — PHYSICAL EXAM
[de-identified] : Patient is well nourished, well-developed, in no acute distress, with appropriate mood and affect. The patient is oriented to time, place, and person. Respirations are even and unlabored. Gait evaluation does reveal a limp. There is no inguinal adenopathy. Examination of the contralateral knee shows normal range of motion, strength, no tenderness, and intact skin. The affected limb is well-perfused, without skin lesions, shows a grossly normal motor and sensory examination. Knee motion is significantly reduced and does cause significant pain. The knee moves from 10-80 degrees. The knee is stable within that range-of-motion to AP and ML stress. The alignment of the knee is 5 degrees varus. Muscle strength is normal. Pedal pulses are palpable. Hip examination was negative.  [de-identified] : Long standing knee, AP knee, lateral knee, and patellar views of the left knee were brought in by the patient which I reviewed and demonstrate degenerative joint disease of the knee with joint space narrowing, osteophyte formation, and subchondral sclerosis.

## 2024-10-01 NOTE — HISTORY OF PRESENT ILLNESS
[de-identified] : 64-year-old male presenting with acute on chronic left knee pain that began on 9/7/24. He has a history of knee pain following an ankle injury many years ago. In 2002, the knee was aspirated and injected with steroids, which provided relief until recently. The patient reports no recent injury but notes that the pain has become constant, worsening with walking and standing. He experiences stiffness in the knee and finds that Advil offers some relief. The use of a brace has been helpful in managing symptoms.

## 2024-10-01 NOTE — PHYSICAL EXAM
[de-identified] : left knee exam  Skin: Clean, dry, intact Inspection: Varus deformity, no masses, + swelling, + moderate effusion Pulses: 2+ DP/PT pulses ROM: 10-80 degrees of flexion. No pain with deep knee flexion/extension. Tenderness: No MJLT. No LJLT. No pain over the patella facets. No pain to the quadriceps tendon. No pain to the patella tendon. No posterior knee tenderness. Stability: Stable to varus, valgus. Negative lachman testing. Negative anterior drawer, negative posterior drawer. Strength: 5/5 Q/H/TA/GS/EHL, without atrophy Neuro: In tact to light touch throughout, DTR's normal Additional tests: Negative McMurrays test, Negative patellar grind test.  [de-identified] : The following radiographs were ordered and read by me during this patients visit. I reviewed each radiograph in detail with the patient and discussed the findings as highlighted below.   4 views of the left knee were obtained, 09/30/2024, that show no acute fracture or dislocation. There is severe medial, severe lateral and severe patellofemoral degenerative changes seen. There is no significant malalignment. No significant other obvious osseous abnormality, otherwise unremarkable.

## 2024-10-01 NOTE — HISTORY OF PRESENT ILLNESS
[de-identified] : 64-year-old male presenting with acute on chronic left knee pain that began on 9/7/24. He has a history of knee pain following an ankle injury many years ago. In 2002, the knee was aspirated and injected with steroids, which provided relief until recently. The patient reports no recent injury but notes that the pain has become constant, worsening with walking and standing. He experiences stiffness in the knee and finds that Advil offers some relief. The use of a brace has been helpful in managing symptoms.

## 2024-10-01 NOTE — DISCUSSION/SUMMARY
[de-identified] : 63 y/o male with left knee OA.   I discussed the treatment of degenerative arthritis with the patient at length today, as well as the chronic degenerative process and likely future progression of the disease. Pain may be related to the bony degenerative changes seen on XR imaging, or the associated degeneration to the soft tissues within the knee joint, including cartilage, meniscus, or ligamentous structures.  I described the spectrum of treatment options available including nonoperative modalities to total joint arthroplasty.   Given the degree of degenerative disease and inability to perform ADL's without pain, the patient is an appropriate candidate for consideration of total knee arthroplasty. An extensive discussion was conducted of the natural history of the disease and the variety of surgical and non-surgical treatment options available to them at this time. A risk/benefit analysis was discussed with the patient reviewing the advantages and disadvantages of surgical intervention versus continued trial of conservative/nonoperative modalities of OA treatment (PT, injection, medication, bracing etc).  A brief discussion was had of the proposed procedure/rehabilitation, as well as outcomes/risks and benefits. Given current condition, it is my recommendation to seek surgical reconstruction from an arthroplasty specialist to maximize potential for good outcome. Information was provided for the arthroplasty specialists at [facility]; including Dr. Rosa, Dr. Hightower, Dr. Dale and Dr. Gauthier.  Other recommendations include OTC acetaminophen (if tolerated/able), with application of ice to the knee 2-3x daily for 20 minutes or after periods of activity. Diclofenac Rx given  All questions were answered, and the patient is agreeable to consultation for TKA.  I informed the patient that they may continue to contact me with any additional concerns/questions as needed.  f/u as needed.

## 2024-10-01 NOTE — DISCUSSION/SUMMARY
[de-identified] :  This patient has left knee osteoarthritis.  The patient is not an appropriate candidate for surgical intervention at this time. An extensive discussion was conducted on the natural history of the disease and the variety of surgical and non-surgical options available to the patient including, but not limited to non-steroidal anti-inflammatory medications, steroid injections, physical therapy, maintenance of ideal body weight, and reduction of activity.  I recommended and prescribed a course of physical therapy.  He has a prescription for diclofenac at home and will take this medication.  He does not need a new prescription today.  The patient is also encouraged to trial a neoprene sleeve knee brace which can be purchased OTC.  The patient is also encouraged to consider use of a cane.  Today we performed a left knee intra-articular cortisone injection. The patient will schedule an appointment as needed.  Informed consent for the left knee injection was obtained. All risks, benefits and alternatives were discussed. These included but were not limited to bleeding, infection, and allergic reaction.  All questions were answered. A time out was performed. The left knee was prepped and draped in sterile fashion. Using sterile technique, the left knee was injected with 80mg of Kenalog, 4cc of 1% lidocaine, 4cc of 0.25% marcaine using a 21-gauge needle. A sterile dressing was applied. Post injection instructions were reviewed. The patient tolerated the procedure well.

## 2024-10-01 NOTE — DISCUSSION/SUMMARY
[de-identified] : 63 y/o male with left knee OA.   I discussed the treatment of degenerative arthritis with the patient at length today, as well as the chronic degenerative process and likely future progression of the disease. Pain may be related to the bony degenerative changes seen on XR imaging, or the associated degeneration to the soft tissues within the knee joint, including cartilage, meniscus, or ligamentous structures.  I described the spectrum of treatment options available including nonoperative modalities to total joint arthroplasty.   Given the degree of degenerative disease and inability to perform ADL's without pain, the patient is an appropriate candidate for consideration of total knee arthroplasty. An extensive discussion was conducted of the natural history of the disease and the variety of surgical and non-surgical treatment options available to them at this time. A risk/benefit analysis was discussed with the patient reviewing the advantages and disadvantages of surgical intervention versus continued trial of conservative/nonoperative modalities of OA treatment (PT, injection, medication, bracing etc).  A brief discussion was had of the proposed procedure/rehabilitation, as well as outcomes/risks and benefits. Given current condition, it is my recommendation to seek surgical reconstruction from an arthroplasty specialist to maximize potential for good outcome. Information was provided for the arthroplasty specialists at [facility]; including Dr. Rosa, Dr. Hightower, Dr. Dale and Dr. Gauthier.  Other recommendations include OTC acetaminophen (if tolerated/able), with application of ice to the knee 2-3x daily for 20 minutes or after periods of activity. Diclofenac Rx given  All questions were answered, and the patient is agreeable to consultation for TKA.  I informed the patient that they may continue to contact me with any additional concerns/questions as needed.  f/u as needed.

## 2024-10-01 NOTE — PHYSICAL EXAM
[de-identified] : Patient is well nourished, well-developed, in no acute distress, with appropriate mood and affect. The patient is oriented to time, place, and person. Respirations are even and unlabored. Gait evaluation does reveal a limp. There is no inguinal adenopathy. Examination of the contralateral knee shows normal range of motion, strength, no tenderness, and intact skin. The affected limb is well-perfused, without skin lesions, shows a grossly normal motor and sensory examination. Knee motion is significantly reduced and does cause significant pain. The knee moves from 10-80 degrees. The knee is stable within that range-of-motion to AP and ML stress. The alignment of the knee is 5 degrees varus. Muscle strength is normal. Pedal pulses are palpable. Hip examination was negative.  [de-identified] : Long standing knee, AP knee, lateral knee, and patellar views of the left knee were brought in by the patient which I reviewed and demonstrate degenerative joint disease of the knee with joint space narrowing, osteophyte formation, and subchondral sclerosis.

## 2024-10-04 ENCOUNTER — OUTPATIENT (OUTPATIENT)
Dept: OUTPATIENT SERVICES | Facility: HOSPITAL | Age: 64
LOS: 1 days | End: 2024-10-04
Payer: COMMERCIAL

## 2024-10-04 ENCOUNTER — APPOINTMENT (OUTPATIENT)
Dept: UROLOGY | Facility: CLINIC | Age: 64
End: 2024-10-04
Payer: COMMERCIAL

## 2024-10-04 VITALS
BODY MASS INDEX: 32.58 KG/M2 | DIASTOLIC BLOOD PRESSURE: 105 MMHG | HEART RATE: 62 BPM | HEIGHT: 69 IN | SYSTOLIC BLOOD PRESSURE: 209 MMHG | RESPIRATION RATE: 17 BRPM | WEIGHT: 220 LBS

## 2024-10-04 DIAGNOSIS — C67.0 MALIGNANT NEOPLASM OF TRIGONE OF BLADDER: ICD-10-CM

## 2024-10-04 DIAGNOSIS — Z98.890 OTHER SPECIFIED POSTPROCEDURAL STATES: Chronic | ICD-10-CM

## 2024-10-04 DIAGNOSIS — R35.0 FREQUENCY OF MICTURITION: ICD-10-CM

## 2024-10-04 PROCEDURE — 52000 CYSTOURETHROSCOPY: CPT

## 2024-10-07 DIAGNOSIS — C67.0 MALIGNANT NEOPLASM OF TRIGONE OF BLADDER: ICD-10-CM

## 2024-10-07 LAB — URINE CYTOLOGY: NORMAL

## 2024-11-11 NOTE — PROCEDURE NOTE - NSPROCDETAILS_GEN_ALL_CORE
Identified patient with two patient identifiers (name and ). Reviewed chart in preparation for encounter and have obtained necessary documentation.    Patient returned call to schedule final review. Patient is schedule for 24 at 11 AM with Nancy. Patient understood what was discussed and thankful for the help.   
sterile technique, indwelling urinary device inserted/prior to placement, an active physician order for the placement of a urinary catheter was verified/a urinary catheter insertion kit was used for all insertion materials

## 2024-12-17 ENCOUNTER — APPOINTMENT (OUTPATIENT)
Dept: ORTHOPEDIC SURGERY | Facility: CLINIC | Age: 64
End: 2024-12-17
Payer: COMMERCIAL

## 2024-12-17 VITALS — HEIGHT: 69 IN | BODY MASS INDEX: 32.58 KG/M2 | WEIGHT: 220 LBS

## 2024-12-17 DIAGNOSIS — M17.12 UNILATERAL PRIMARY OSTEOARTHRITIS, LEFT KNEE: ICD-10-CM

## 2024-12-17 PROCEDURE — 20610 DRAIN/INJ JOINT/BURSA W/O US: CPT | Mod: LT

## 2024-12-17 PROCEDURE — 99214 OFFICE O/P EST MOD 30 MIN: CPT | Mod: 25

## 2024-12-18 LAB
B PERT IGG+IGM PNL SER: ABNORMAL
COLOR FLD: YELLOW
EOSINOPHIL # FLD MANUAL: 0 %
FLUID INTAKE SUBSTANCE CLASS: NORMAL
JOINT PCR PANEL: NOT DETECTED
JOINT PCR SOURCE: NORMAL
LYMPHOCYTES # FLD MANUAL: 5 %
MESOTHL CELL NFR FLD: 0 %
MONOS+MACROS NFR FLD MANUAL: 10 %
NEUTS SEG # FLD MANUAL: 85 %
NRBC # FLD: 0 %
RBC # FLD MANUAL: 4000 CELLS/UL
SYCRY CLARITY: ABNORMAL
SYCRY COLOR: YELLOW
SYCRY ID: ABNORMAL
SYCRY TUBE: NORMAL
TOTAL CELLS COUNTED FLD: NORMAL CELLS/UL
TUBE TYPE: NORMAL
UNIDENT CELLS NFR FLD MANUAL: 0 %
VARIANT LYMPHS # FLD MANUAL: 0 %
WBC COUNT: NORMAL CELLS/UL

## 2024-12-25 LAB — FUNGUS FLD CULT: NORMAL

## 2024-12-27 LAB — JOINT CULTURE: NORMAL

## 2025-04-11 ENCOUNTER — APPOINTMENT (OUTPATIENT)
Dept: UROLOGY | Facility: CLINIC | Age: 65
End: 2025-04-11

## 2025-05-02 ENCOUNTER — APPOINTMENT (OUTPATIENT)
Dept: ORTHOPEDIC SURGERY | Facility: CLINIC | Age: 65
End: 2025-05-02
Payer: COMMERCIAL

## 2025-05-02 VITALS — WEIGHT: 230 LBS | HEIGHT: 68 IN | BODY MASS INDEX: 34.86 KG/M2

## 2025-05-02 DIAGNOSIS — M17.12 UNILATERAL PRIMARY OSTEOARTHRITIS, LEFT KNEE: ICD-10-CM

## 2025-05-02 PROCEDURE — 99214 OFFICE O/P EST MOD 30 MIN: CPT | Mod: 25

## 2025-05-02 PROCEDURE — 73564 X-RAY EXAM KNEE 4 OR MORE: CPT | Mod: LT

## 2025-05-02 PROCEDURE — 20610 DRAIN/INJ JOINT/BURSA W/O US: CPT | Mod: LT

## 2025-05-09 ENCOUNTER — APPOINTMENT (OUTPATIENT)
Dept: UROLOGY | Facility: CLINIC | Age: 65
End: 2025-05-09
Payer: COMMERCIAL

## 2025-05-09 ENCOUNTER — OUTPATIENT (OUTPATIENT)
Dept: OUTPATIENT SERVICES | Facility: HOSPITAL | Age: 65
LOS: 1 days | End: 2025-05-09
Payer: COMMERCIAL

## 2025-05-09 VITALS — SYSTOLIC BLOOD PRESSURE: 202 MMHG | DIASTOLIC BLOOD PRESSURE: 108 MMHG | RESPIRATION RATE: 16 BRPM | HEART RATE: 60 BPM

## 2025-05-09 DIAGNOSIS — R35.0 FREQUENCY OF MICTURITION: ICD-10-CM

## 2025-05-09 DIAGNOSIS — C67.0 MALIGNANT NEOPLASM OF TRIGONE OF BLADDER: ICD-10-CM

## 2025-05-09 DIAGNOSIS — Z98.890 OTHER SPECIFIED POSTPROCEDURAL STATES: Chronic | ICD-10-CM

## 2025-05-09 PROCEDURE — 52000 CYSTOURETHROSCOPY: CPT

## 2025-05-12 DIAGNOSIS — C67.0 MALIGNANT NEOPLASM OF TRIGONE OF BLADDER: ICD-10-CM

## 2025-05-14 LAB — URINE CYTOLOGY: NORMAL

## 2025-07-15 ENCOUNTER — APPOINTMENT (OUTPATIENT)
Dept: ORTHOPEDIC SURGERY | Facility: CLINIC | Age: 65
End: 2025-07-15

## 2025-09-02 ENCOUNTER — APPOINTMENT (OUTPATIENT)
Dept: ORTHOPEDIC SURGERY | Facility: CLINIC | Age: 65
End: 2025-09-02
Payer: COMMERCIAL

## 2025-09-02 VITALS — BODY MASS INDEX: 34.86 KG/M2 | WEIGHT: 230 LBS | HEIGHT: 68 IN

## 2025-09-02 DIAGNOSIS — M17.12 UNILATERAL PRIMARY OSTEOARTHRITIS, LEFT KNEE: ICD-10-CM

## 2025-09-02 PROCEDURE — 20610 DRAIN/INJ JOINT/BURSA W/O US: CPT | Mod: LT

## 2025-09-02 PROCEDURE — 99214 OFFICE O/P EST MOD 30 MIN: CPT | Mod: 25

## (undated) DEVICE — SYR ASEPTO

## (undated) DEVICE — TUBING THERMADX UROLOGY

## (undated) DEVICE — GOWN TRIMAX LG

## (undated) DEVICE — ELCTR PLASMA LOOP MEDIUM 24FR 12-16 DEG

## (undated) DEVICE — TUBING RANGER FLUID IRRIGATION SET DISP

## (undated) DEVICE — ELCTR BUGBEE FULGATING 5FR X 58CM

## (undated) DEVICE — TUBING SUCTION 20FT

## (undated) DEVICE — SYR LUER LOK 30CC

## (undated) DEVICE — DRAPE EQUIPMENT BANDED BAG 30 X 30" (SHOWER CAP)

## (undated) DEVICE — GLV 7.5 PROTEXIS (WHITE)

## (undated) DEVICE — WARMING BLANKET UPPER ADULT

## (undated) DEVICE — ADAPTER CHECK FLO 9FR STERILE

## (undated) DEVICE — IRR BULB PATHFINDER + 10"

## (undated) DEVICE — ELCTR CUTTING LOOP 24FR 12 DEG 0.35 WIRE

## (undated) DEVICE — PACK CYSTO

## (undated) DEVICE — POSITIONER FOAM EGG CRATE ULNAR 2PCS (PINK)

## (undated) DEVICE — SOL IRR BAG NS 0.9% 3000ML

## (undated) DEVICE — FOLEY TRAY 16FR 5CC LTX UMETER CLOSED

## (undated) DEVICE — FOLEY HOLDER STATLOCK 2 WAY ADULT

## (undated) DEVICE — FOLEY CATH 3-WAY 22FR 30CC LATEX LUBRICATH

## (undated) DEVICE — BAG URINE W METER 2L

## (undated) DEVICE — ELCTR PLASMA LOOP MEDIUM ANGLED 24FR 12-30 DEG

## (undated) DEVICE — SOL IRR BAG H2O 3000ML

## (undated) DEVICE — GLV 7 PROTEXIS (WHITE)

## (undated) DEVICE — CABLE DAC ACTIVE CORD

## (undated) DEVICE — IRRIGATION TRAY W PISTON SYRINGE 60ML

## (undated) DEVICE — ELCTR GROUNDING PAD ADULT COVIDIEN

## (undated) DEVICE — GLV 6.5 PROTEXIS (WHITE)

## (undated) DEVICE — ACMI SELF-SEALING SEAL UP TO 7FR

## (undated) DEVICE — SOL IRR POUR NS 0.9% 500ML

## (undated) DEVICE — POSITIONER FOAM HEADREST (PINK)

## (undated) DEVICE — ELCTR PLASMA BUTTON OVAL 24FR 12-30 DEG

## (undated) DEVICE — PREP BETADINE KIT

## (undated) DEVICE — SYR IRRIGATION PISTON 60CC

## (undated) DEVICE — SOL IRR POUR H2O 1500ML

## (undated) DEVICE — DRAPE LINGEMAN TUR

## (undated) DEVICE — VENODYNE/SCD SLEEVE CALF LARGE

## (undated) DEVICE — GLV 6 PROTEXIS (WHITE)

## (undated) DEVICE — GLV 8 PROTEXIS (WHITE)

## (undated) DEVICE — ELCTR PLASMA ROLLER 24FR 12-30 DEG

## (undated) DEVICE — BOSTON SCIENTIFC PUMPING SYSTEM SAPS SINGLE ACTION 10CC

## (undated) DEVICE — VENODYNE/SCD SLEEVE CALF MEDIUM